# Patient Record
Sex: MALE | Race: WHITE | Employment: OTHER | URBAN - NONMETROPOLITAN AREA
[De-identification: names, ages, dates, MRNs, and addresses within clinical notes are randomized per-mention and may not be internally consistent; named-entity substitution may affect disease eponyms.]

---

## 2017-01-01 ENCOUNTER — APPOINTMENT (OUTPATIENT)
Dept: GENERAL RADIOLOGY | Age: 82
DRG: 312 | End: 2017-01-01
Payer: MEDICARE

## 2017-01-01 ENCOUNTER — HOSPITAL ENCOUNTER (EMERGENCY)
Age: 82
Discharge: NURSING, REHAB OR LONG-TERM CARE | End: 2017-11-21
Attending: EMERGENCY MEDICINE
Payer: MEDICARE

## 2017-01-01 ENCOUNTER — APPOINTMENT (OUTPATIENT)
Dept: ULTRASOUND IMAGING | Age: 82
End: 2017-01-01
Payer: MEDICARE

## 2017-01-01 ENCOUNTER — TELEPHONE (OUTPATIENT)
Dept: UROLOGY | Age: 82
End: 2017-01-01

## 2017-01-01 ENCOUNTER — OFFICE VISIT (OUTPATIENT)
Dept: UROLOGY | Age: 82
End: 2017-01-01

## 2017-01-01 ENCOUNTER — APPOINTMENT (OUTPATIENT)
Dept: GENERAL RADIOLOGY | Age: 82
End: 2017-01-01
Payer: MEDICARE

## 2017-01-01 ENCOUNTER — HOSPITAL ENCOUNTER (EMERGENCY)
Age: 82
Discharge: NURSING, REHAB OR LONG-TERM CARE | End: 2017-11-22
Payer: MEDICARE

## 2017-01-01 ENCOUNTER — APPOINTMENT (OUTPATIENT)
Dept: CT IMAGING | Age: 82
End: 2017-01-01
Payer: MEDICARE

## 2017-01-01 ENCOUNTER — APPOINTMENT (OUTPATIENT)
Dept: GENERAL RADIOLOGY | Age: 82
DRG: 194 | End: 2017-01-01
Payer: MEDICARE

## 2017-01-01 ENCOUNTER — HOSPITAL ENCOUNTER (EMERGENCY)
Age: 82
Discharge: HOME | End: 2017-09-04
Attending: EMERGENCY MEDICINE
Payer: MEDICARE

## 2017-01-01 ENCOUNTER — APPOINTMENT (OUTPATIENT)
Dept: INTERVENTIONAL RADIOLOGY/VASCULAR | Age: 82
DRG: 312 | End: 2017-01-01
Payer: MEDICARE

## 2017-01-01 ENCOUNTER — HOSPITAL ENCOUNTER (INPATIENT)
Dept: INPATIENT UNIT | Age: 82
LOS: 13 days | Discharge: HOME HEALTH | DRG: 194 | End: 2017-10-17
Attending: FAMILY MEDICINE | Admitting: FAMILY MEDICINE
Payer: MEDICARE

## 2017-01-01 ENCOUNTER — HOSPITAL ENCOUNTER (INPATIENT)
Age: 82
LOS: 4 days | Discharge: HOME HEALTH | DRG: 312 | End: 2017-09-19
Attending: INTERNAL MEDICINE | Admitting: INTERNAL MEDICINE
Payer: MEDICARE

## 2017-01-01 ENCOUNTER — APPOINTMENT (OUTPATIENT)
Dept: CT IMAGING | Age: 82
DRG: 194 | End: 2017-01-01
Payer: MEDICARE

## 2017-01-01 ENCOUNTER — HOSPITAL ENCOUNTER (INPATIENT)
Age: 82
LOS: 9 days | Discharge: NURSING, REHAB OR LONG-TERM CARE | DRG: 194 | End: 2017-10-04
Attending: EMERGENCY MEDICINE | Admitting: INTERNAL MEDICINE
Payer: MEDICARE

## 2017-01-01 ENCOUNTER — APPOINTMENT (OUTPATIENT)
Dept: CT IMAGING | Age: 82
DRG: 312 | End: 2017-01-01
Payer: MEDICARE

## 2017-01-01 VITALS
SYSTOLIC BLOOD PRESSURE: 158 MMHG | RESPIRATION RATE: 16 BRPM | DIASTOLIC BLOOD PRESSURE: 73 MMHG | WEIGHT: 154.9 LBS | BODY MASS INDEX: 22.18 KG/M2 | TEMPERATURE: 96.9 F | HEIGHT: 70 IN | HEART RATE: 68 BPM | OXYGEN SATURATION: 98 %

## 2017-01-01 VITALS
OXYGEN SATURATION: 95 % | WEIGHT: 166.5 LBS | HEART RATE: 65 BPM | DIASTOLIC BLOOD PRESSURE: 88 MMHG | HEIGHT: 70 IN | SYSTOLIC BLOOD PRESSURE: 137 MMHG | RESPIRATION RATE: 18 BRPM | BODY MASS INDEX: 20.16 KG/M2 | DIASTOLIC BLOOD PRESSURE: 60 MMHG | RESPIRATION RATE: 20 BRPM | TEMPERATURE: 98.1 F | HEIGHT: 70 IN | WEIGHT: 140.8 LBS | BODY MASS INDEX: 23.84 KG/M2 | SYSTOLIC BLOOD PRESSURE: 137 MMHG | TEMPERATURE: 98 F | HEART RATE: 68 BPM | OXYGEN SATURATION: 93 %

## 2017-01-01 VITALS — HEIGHT: 70 IN | BODY MASS INDEX: 24.34 KG/M2 | WEIGHT: 170 LBS

## 2017-01-01 VITALS
OXYGEN SATURATION: 97 % | DIASTOLIC BLOOD PRESSURE: 72 MMHG | SYSTOLIC BLOOD PRESSURE: 155 MMHG | TEMPERATURE: 98.7 F | RESPIRATION RATE: 16 BRPM | HEART RATE: 66 BPM

## 2017-01-01 VITALS
OXYGEN SATURATION: 95 % | HEART RATE: 83 BPM | DIASTOLIC BLOOD PRESSURE: 85 MMHG | TEMPERATURE: 97.7 F | RESPIRATION RATE: 18 BRPM | SYSTOLIC BLOOD PRESSURE: 143 MMHG

## 2017-01-01 VITALS
DIASTOLIC BLOOD PRESSURE: 63 MMHG | HEART RATE: 57 BPM | HEIGHT: 70 IN | WEIGHT: 150 LBS | TEMPERATURE: 97.9 F | SYSTOLIC BLOOD PRESSURE: 136 MMHG | OXYGEN SATURATION: 97 % | BODY MASS INDEX: 21.47 KG/M2 | RESPIRATION RATE: 17 BRPM
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
## 2017-01-01 DIAGNOSIS — I67.9 CEREBROVASCULAR DISEASE: Primary | ICD-10-CM

## 2017-01-01 DIAGNOSIS — S40.021A CONTUSION, SHOULDER AND UPPER ARM, MULTIPLE SITES, RIGHT, INITIAL ENCOUNTER: ICD-10-CM

## 2017-01-01 DIAGNOSIS — R26.2 UNABLE TO AMBULATE: ICD-10-CM
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
## 2017-01-01 DIAGNOSIS — I10 HYPERTENSION, UNSPECIFIED TYPE: Primary | ICD-10-CM

## 2017-01-01 DIAGNOSIS — R53.81 DEBILITY: ICD-10-CM

## 2017-01-01 DIAGNOSIS — E87.1 HYPONATREMIA: ICD-10-CM

## 2017-01-01 DIAGNOSIS — S40.011A CONTUSION, SHOULDER AND UPPER ARM, MULTIPLE SITES, RIGHT, INITIAL ENCOUNTER: ICD-10-CM

## 2017-01-01 DIAGNOSIS — N28.89 RENAL MASS: Primary | ICD-10-CM

## 2017-01-01 DIAGNOSIS — J18.9 PNEUMONIA DUE TO ORGANISM: Primary | ICD-10-CM

## 2017-01-01 DIAGNOSIS — S09.90XA CLOSED HEAD INJURY, INITIAL ENCOUNTER: ICD-10-CM

## 2017-01-01 DIAGNOSIS — R55 SYNCOPE AND COLLAPSE: Primary | ICD-10-CM

## 2017-01-01 DIAGNOSIS — R41.0 DELIRIUM: Primary | ICD-10-CM

## 2017-01-01 DIAGNOSIS — S01.01XA SCALP LACERATION, INITIAL ENCOUNTER: ICD-10-CM

## 2017-01-01 LAB
ALBUMIN SERPL-MCNC: 3.1 G/DL (ref 3.5–5.1)
ALBUMIN SERPL-MCNC: 3.3 G/DL (ref 3.5–5.1)
ALBUMIN SERPL-MCNC: 3.5 G/DL (ref 3.5–5.1)
ALLEN TEST: POSITIVE
ALP BLD-CCNC: 63 U/L (ref 38–126)
ALP BLD-CCNC: 68 U/L (ref 38–126)
ALP BLD-CCNC: 68 U/L (ref 38–126)
ALT SERPL-CCNC: 8 U/L (ref 11–66)
ALT SERPL-CCNC: 8 U/L (ref 11–66)
ALT SERPL-CCNC: 9 U/L (ref 11–66)
AMPHETAMINE+METHAMPHETAMINE URINE SCREEN: NEGATIVE
ANION GAP SERPL CALCULATED.3IONS-SCNC: 10 MEQ/L (ref 8–16)
ANION GAP SERPL CALCULATED.3IONS-SCNC: 12 MEQ/L (ref 8–16)
ANION GAP SERPL CALCULATED.3IONS-SCNC: 14 MEQ/L (ref 8–16)
ANION GAP SERPL CALCULATED.3IONS-SCNC: 9 MEQ/L (ref 8–16)
ANION GAP SERPL CALCULATED.3IONS-SCNC: 9 MEQ/L (ref 8–16)
APTT: 30 SECONDS (ref 22–38)
AST SERPL-CCNC: 18 U/L (ref 5–40)
AST SERPL-CCNC: 19 U/L (ref 5–40)
AST SERPL-CCNC: 22 U/L (ref 5–40)
AVERAGE GLUCOSE: 102 MG/DL (ref 70–126)
BACTERIA: ABNORMAL
BACTERIA: ABNORMAL /HPF
BARBITURATE QUANTITATIVE URINE: NEGATIVE
BASE EXCESS (CALCULATED): 6 MMOL/L (ref -2.5–2.5)
BASOPHILS # BLD: 0.5 %
BASOPHILS # BLD: 0.5 %
BASOPHILS # BLD: 0.6 %
BASOPHILS # BLD: 1 %
BASOPHILS # BLD: 1.1 %
BASOPHILS # BLD: 1.2 %
BASOPHILS # BLD: 2.6 %
BASOPHILS ABSOLUTE: 0 THOU/MM3 (ref 0–0.1)
BASOPHILS ABSOLUTE: 0.1 THOU/MM3 (ref 0–0.1)
BASOPHILS ABSOLUTE: 0.2 THOU/MM3 (ref 0–0.1)
BENZODIAZEPINE QUANTITATIVE URINE: NEGATIVE
BILIRUB SERPL-MCNC: 0.3 MG/DL (ref 0.3–1.2)
BILIRUB SERPL-MCNC: 0.6 MG/DL (ref 0.3–1.2)
BILIRUB SERPL-MCNC: 0.7 MG/DL (ref 0.3–1.2)
BILIRUBIN DIRECT: < 0.2 MG/DL (ref 0–0.3)
BILIRUBIN DIRECT: < 0.2 MG/DL (ref 0–0.3)
BILIRUBIN URINE: NEGATIVE
BLOOD CULTURE, ROUTINE: NORMAL
BLOOD CULTURE, ROUTINE: NORMAL
BLOOD, URINE: NEGATIVE
BUN BLDV-MCNC: 10 MG/DL (ref 7–22)
BUN BLDV-MCNC: 12 MG/DL (ref 7–22)
BUN BLDV-MCNC: 12 MG/DL (ref 7–22)
BUN BLDV-MCNC: 16 MG/DL (ref 7–22)
BUN BLDV-MCNC: 17 MG/DL (ref 7–22)
BUN BLDV-MCNC: 20 MG/DL (ref 7–22)
BUN BLDV-MCNC: 23 MG/DL (ref 7–22)
CALCIUM SERPL-MCNC: 8.1 MG/DL (ref 8.5–10.5)
CALCIUM SERPL-MCNC: 8.3 MG/DL (ref 8.5–10.5)
CALCIUM SERPL-MCNC: 8.4 MG/DL (ref 8.5–10.5)
CALCIUM SERPL-MCNC: 8.5 MG/DL (ref 8.5–10.5)
CALCIUM SERPL-MCNC: 8.5 MG/DL (ref 8.5–10.5)
CALCIUM SERPL-MCNC: 8.8 MG/DL (ref 8.5–10.5)
CALCIUM SERPL-MCNC: 8.9 MG/DL (ref 8.5–10.5)
CANNABINOID QUANTITATIVE URINE: NEGATIVE
CASTS 2: ABNORMAL /LPF
CASTS UA: ABNORMAL /LPF
CASTS: ABNORMAL /LPF
CASTS: ABNORMAL /LPF
CHARACTER, URINE: ABNORMAL
CHARACTER, URINE: CLEAR
CHLORIDE BLD-SCNC: 100 MEQ/L (ref 98–111)
CHLORIDE BLD-SCNC: 101 MEQ/L (ref 98–111)
CHLORIDE BLD-SCNC: 90 MEQ/L (ref 98–111)
CHLORIDE BLD-SCNC: 93 MEQ/L (ref 98–111)
CHLORIDE BLD-SCNC: 94 MEQ/L (ref 98–111)
CHLORIDE BLD-SCNC: 96 MEQ/L (ref 98–111)
CHLORIDE BLD-SCNC: 97 MEQ/L (ref 98–111)
CO2: 21 MEQ/L (ref 23–33)
CO2: 23 MEQ/L (ref 23–33)
CO2: 27 MEQ/L (ref 23–33)
CO2: 27 MEQ/L (ref 23–33)
CO2: 28 MEQ/L (ref 23–33)
CO2: 28 MEQ/L (ref 23–33)
CO2: 29 MEQ/L (ref 23–33)
COCAINE METABOLITE QUANTITATIVE URINE: NEGATIVE
COLLECTED BY:: ABNORMAL
COLOR: YELLOW
CORTISOL COLLECTION INFO: NORMAL
CORTISOL: 12.11 UG/DL
CREAT SERPL-MCNC: 0.6 MG/DL (ref 0.4–1.2)
CREAT SERPL-MCNC: 0.6 MG/DL (ref 0.4–1.2)
CREAT SERPL-MCNC: 0.8 MG/DL (ref 0.4–1.2)
CREAT SERPL-MCNC: 0.8 MG/DL (ref 0.4–1.2)
CREAT SERPL-MCNC: 0.9 MG/DL (ref 0.4–1.2)
CREAT SERPL-MCNC: 0.9 MG/DL (ref 0.4–1.2)
CREAT SERPL-MCNC: 1 MG/DL (ref 0.4–1.2)
CRYSTALS, UA: ABNORMAL
CRYSTALS: ABNORMAL
DEVICE: ABNORMAL
EKG ATRIAL RATE: 75 BPM
EKG ATRIAL RATE: 80 BPM
EKG P AXIS: 45 DEGREES
EKG P AXIS: 58 DEGREES
EKG P-R INTERVAL: 264 MS
EKG P-R INTERVAL: 280 MS
EKG Q-T INTERVAL: 390 MS
EKG Q-T INTERVAL: 406 MS
EKG QRS DURATION: 76 MS
EKG QRS DURATION: 80 MS
EKG QTC CALCULATION (BAZETT): 449 MS
EKG QTC CALCULATION (BAZETT): 453 MS
EKG R AXIS: -60 DEGREES
EKG R AXIS: -61 DEGREES
EKG T AXIS: 41 DEGREES
EKG T AXIS: 44 DEGREES
EKG VENTRICULAR RATE: 75 BPM
EKG VENTRICULAR RATE: 80 BPM
EOSINOPHIL # BLD: 0.3 %
EOSINOPHIL # BLD: 0.9 %
EOSINOPHIL # BLD: 0.9 %
EOSINOPHIL # BLD: 1.8 %
EOSINOPHIL # BLD: 3.6 %
EOSINOPHIL # BLD: 3.9 %
EOSINOPHIL # BLD: 5.1 %
EOSINOPHILS ABSOLUTE: 0 THOU/MM3 (ref 0–0.4)
EOSINOPHILS ABSOLUTE: 0.1 THOU/MM3 (ref 0–0.4)
EOSINOPHILS ABSOLUTE: 0.2 THOU/MM3 (ref 0–0.4)
EOSINOPHILS ABSOLUTE: 0.2 THOU/MM3 (ref 0–0.4)
EOSINOPHILS ABSOLUTE: 0.3 THOU/MM3 (ref 0–0.4)
EPITHELIAL CELLS, UA: ABNORMAL /HPF
ETHYL ALCOHOL, SERUM: < 0.01 %
GFR SERPL CREATININE-BSD FRML MDRD: 69 ML/MIN/1.73M2
GFR SERPL CREATININE-BSD FRML MDRD: 78 ML/MIN/1.73M2
GFR SERPL CREATININE-BSD FRML MDRD: 78 ML/MIN/1.73M2
GFR SERPL CREATININE-BSD FRML MDRD: 89 ML/MIN/1.73M2
GFR SERPL CREATININE-BSD FRML MDRD: 89 ML/MIN/1.73M2
GFR SERPL CREATININE-BSD FRML MDRD: > 90 ML/MIN/1.73M2
GFR SERPL CREATININE-BSD FRML MDRD: > 90 ML/MIN/1.73M2
GLUCOSE BLD-MCNC: 102 MG/DL (ref 70–108)
GLUCOSE BLD-MCNC: 102 MG/DL (ref 70–108)
GLUCOSE BLD-MCNC: 104 MG/DL (ref 70–108)
GLUCOSE BLD-MCNC: 106 MG/DL (ref 70–108)
GLUCOSE BLD-MCNC: 108 MG/DL (ref 70–108)
GLUCOSE BLD-MCNC: 109 MG/DL (ref 70–108)
GLUCOSE BLD-MCNC: 113 MG/DL (ref 70–108)
GLUCOSE BLD-MCNC: 114 MG/DL (ref 70–108)
GLUCOSE BLD-MCNC: 117 MG/DL (ref 70–108)
GLUCOSE BLD-MCNC: 119 MG/DL (ref 70–108)
GLUCOSE BLD-MCNC: 125 MG/DL (ref 70–108)
GLUCOSE BLD-MCNC: 134 MG/DL (ref 70–108)
GLUCOSE BLD-MCNC: 135 MG/DL (ref 70–108)
GLUCOSE BLD-MCNC: 136 MG/DL (ref 70–108)
GLUCOSE BLD-MCNC: 138 MG/DL (ref 70–108)
GLUCOSE BLD-MCNC: 143 MG/DL (ref 70–108)
GLUCOSE BLD-MCNC: 156 MG/DL (ref 70–108)
GLUCOSE BLD-MCNC: 159 MG/DL (ref 70–108)
GLUCOSE BLD-MCNC: 164 MG/DL (ref 70–108)
GLUCOSE BLD-MCNC: 167 MG/DL (ref 70–108)
GLUCOSE BLD-MCNC: 171 MG/DL (ref 70–108)
GLUCOSE BLD-MCNC: 173 MG/DL (ref 70–108)
GLUCOSE BLD-MCNC: 180 MG/DL (ref 70–108)
GLUCOSE BLD-MCNC: 196 MG/DL (ref 70–108)
GLUCOSE BLD-MCNC: 199 MG/DL (ref 70–108)
GLUCOSE BLD-MCNC: 202 MG/DL (ref 70–108)
GLUCOSE BLD-MCNC: 255 MG/DL (ref 70–108)
GLUCOSE BLD-MCNC: 84 MG/DL (ref 70–108)
GLUCOSE BLD-MCNC: 86 MG/DL (ref 70–108)
GLUCOSE BLD-MCNC: 89 MG/DL (ref 70–108)
GLUCOSE BLD-MCNC: 90 MG/DL (ref 70–108)
GLUCOSE BLD-MCNC: 92 MG/DL
GLUCOSE BLD-MCNC: 92 MG/DL (ref 70–108)
GLUCOSE BLD-MCNC: 92 MG/DL (ref 70–108)
GLUCOSE BLD-MCNC: 96 MG/DL (ref 70–108)
GLUCOSE BLD-MCNC: 97 MG/DL (ref 70–108)
GLUCOSE BLD-MCNC: 97 MG/DL (ref 70–108)
GLUCOSE BLD-MCNC: 99 MG/DL (ref 70–108)
GLUCOSE URINE: NEGATIVE MG/DL
GLUCOSE, URINE: NEGATIVE MG/DL
HBA1C MFR BLD: 5.4 % (ref 4.4–6.4)
HCO3: 31 MMOL/L (ref 23–28)
HCT VFR BLD CALC: 31.7 % (ref 42–52)
HCT VFR BLD CALC: 32.8 % (ref 42–52)
HCT VFR BLD CALC: 33.8 % (ref 42–52)
HCT VFR BLD CALC: 35.4 % (ref 42–52)
HCT VFR BLD CALC: 35.8 % (ref 42–52)
HCT VFR BLD CALC: 36.9 % (ref 42–52)
HCT VFR BLD CALC: 39.6 % (ref 42–52)
HCT VFR BLD CALC: 40.2 % (ref 42–52)
HEMOGLOBIN: 11 GM/DL (ref 14–18)
HEMOGLOBIN: 11.3 GM/DL (ref 14–18)
HEMOGLOBIN: 11.5 GM/DL (ref 14–18)
HEMOGLOBIN: 12.2 GM/DL (ref 14–18)
HEMOGLOBIN: 12.3 GM/DL (ref 14–18)
HEMOGLOBIN: 12.6 GM/DL (ref 14–18)
HEMOGLOBIN: 13.5 GM/DL (ref 14–18)
HEMOGLOBIN: 13.5 GM/DL (ref 14–18)
INR BLD: 0.93 (ref 0.85–1.13)
INR BLD: 0.99 (ref 0.85–1.13)
KETONES, URINE: NEGATIVE
LEUKOCYTE ESTERASE, URINE: ABNORMAL
LEUKOCYTE ESTERASE, URINE: ABNORMAL
LEUKOCYTE ESTERASE, URINE: NEGATIVE
LIPASE: 53.5 U/L (ref 5.6–51.3)
LV EF: 58 %
LVEF MODALITY: NORMAL
LYMPHOCYTES # BLD: 10.7 %
LYMPHOCYTES # BLD: 12.5 %
LYMPHOCYTES # BLD: 13.2 %
LYMPHOCYTES # BLD: 14 %
LYMPHOCYTES # BLD: 15.1 %
LYMPHOCYTES # BLD: 15.8 %
LYMPHOCYTES # BLD: 18.7 %
LYMPHOCYTES ABSOLUTE: 0.8 THOU/MM3 (ref 1–4.8)
LYMPHOCYTES ABSOLUTE: 0.9 THOU/MM3 (ref 1–4.8)
LYMPHOCYTES ABSOLUTE: 1.1 THOU/MM3 (ref 1–4.8)
MACROCYTES: ABNORMAL
MAGNESIUM: 2 MG/DL (ref 1.6–2.4)
MAGNESIUM: 2.2 MG/DL (ref 1.6–2.4)
MCH RBC QN AUTO: 32.7 PG (ref 27–31)
MCH RBC QN AUTO: 33.5 PG (ref 27–31)
MCH RBC QN AUTO: 34 PG (ref 27–31)
MCH RBC QN AUTO: 34 PG (ref 27–31)
MCH RBC QN AUTO: 34.1 PG (ref 27–31)
MCH RBC QN AUTO: 34.2 PG (ref 27–31)
MCH RBC QN AUTO: 34.2 PG (ref 27–31)
MCHC RBC AUTO-ENTMCNC: 33.6 GM/DL (ref 33–37)
MCHC RBC AUTO-ENTMCNC: 33.9 GM/DL (ref 33–37)
MCHC RBC AUTO-ENTMCNC: 34 GM/DL (ref 33–37)
MCHC RBC AUTO-ENTMCNC: 34.1 GM/DL (ref 33–37)
MCHC RBC AUTO-ENTMCNC: 34.4 GM/DL (ref 33–37)
MCHC RBC AUTO-ENTMCNC: 34.4 GM/DL (ref 33–37)
MCHC RBC AUTO-ENTMCNC: 34.6 GM/DL (ref 33–37)
MCV RBC AUTO: 97.3 FL (ref 80–94)
MCV RBC AUTO: 98.8 FL (ref 80–94)
MCV RBC AUTO: 98.9 FL (ref 80–94)
MCV RBC AUTO: 99 FL (ref 80–94)
MCV RBC AUTO: 99.6 FL (ref 80–94)
MCV RBC AUTO: 99.8 FL (ref 80–94)
MCV RBC AUTO: 99.9 FL (ref 80–94)
MISCELLANEOUS 2: ABNORMAL
MISCELLANEOUS LAB TEST RESULT: ABNORMAL
MONOCYTES # BLD: 10.4 %
MONOCYTES # BLD: 10.8 %
MONOCYTES # BLD: 11 %
MONOCYTES # BLD: 12.2 %
MONOCYTES # BLD: 12.4 %
MONOCYTES # BLD: 8.7 %
MONOCYTES # BLD: 9 %
MONOCYTES ABSOLUTE: 0.6 THOU/MM3 (ref 0.4–1.3)
MONOCYTES ABSOLUTE: 0.7 THOU/MM3 (ref 0.4–1.3)
MONOCYTES ABSOLUTE: 0.8 THOU/MM3 (ref 0.4–1.3)
NITRITE, URINE: NEGATIVE
NUCLEATED RED BLOOD CELLS: 0 /100 WBC
O2 SATURATION: 92 %
OPIATES, URINE: NEGATIVE
ORGANISM: ABNORMAL
OSMOLALITY CALCULATION: 257 MOSMOL/KG (ref 275–300)
OSMOLALITY CALCULATION: 267 MOSMOL/KG (ref 275–300)
OSMOLALITY CALCULATION: 271.6 MOSMOL/KG (ref 275–300)
OSMOLALITY CALCULATION: 273.5 MOSMOL/KG (ref 275–300)
OSMOLALITY CALCULATION: 279.4 MOSMOL/KG (ref 275–300)
OSMOLALITY URINE: 341 MOSMOL/KG (ref 250–750)
OSMOLALITY: 272 MOSMOL/KG (ref 275–295)
OXYCODONE: NEGATIVE
PCO2: 44 MMHG (ref 35–45)
PDW BLD-RTO: 13.6 % (ref 11.5–14.5)
PDW BLD-RTO: 13.8 % (ref 11.5–14.5)
PDW BLD-RTO: 13.9 % (ref 11.5–14.5)
PDW BLD-RTO: 14.1 % (ref 11.5–14.5)
PH BLOOD GAS: 7.45 (ref 7.35–7.45)
PH UA: 6
PH UA: 6.5
PH UA: 6.5
PH UA: 7.5
PH UA: 7.5
PHENCYCLIDINE QUANTITATIVE URINE: NEGATIVE
PHOSPHORUS: 3.3 MG/DL (ref 2.4–4.7)
PLATELET # BLD: 170 THOU/MM3 (ref 130–400)
PLATELET # BLD: 181 THOU/MM3 (ref 130–400)
PLATELET # BLD: 190 THOU/MM3 (ref 130–400)
PLATELET # BLD: 195 THOU/MM3 (ref 130–400)
PLATELET # BLD: 219 THOU/MM3 (ref 130–400)
PLATELET # BLD: 230 THOU/MM3 (ref 130–400)
PLATELET # BLD: 265 THOU/MM3 (ref 130–400)
PMV BLD AUTO: 7.1 MCM (ref 7.4–10.4)
PMV BLD AUTO: 7.5 MCM (ref 7.4–10.4)
PMV BLD AUTO: 7.6 MCM (ref 7.4–10.4)
PMV BLD AUTO: 7.6 MCM (ref 7.4–10.4)
PMV BLD AUTO: 7.7 MCM (ref 7.4–10.4)
PMV BLD AUTO: 7.9 MCM (ref 7.4–10.4)
PMV BLD AUTO: 8.6 MCM (ref 7.4–10.4)
PO2: 60 MMHG (ref 71–104)
POTASSIUM SERPL-SCNC: 3.6 MEQ/L (ref 3.5–5.2)
POTASSIUM SERPL-SCNC: 3.8 MEQ/L (ref 3.5–5.2)
POTASSIUM SERPL-SCNC: 3.9 MEQ/L (ref 3.5–5.2)
POTASSIUM SERPL-SCNC: 3.9 MEQ/L (ref 3.5–5.2)
POTASSIUM SERPL-SCNC: 4 MEQ/L (ref 3.5–5.2)
POTASSIUM SERPL-SCNC: 4.1 MEQ/L (ref 3.5–5.2)
POTASSIUM SERPL-SCNC: 5 MEQ/L (ref 3.5–5.2)
PRO-BNP: 1425 PG/ML (ref 0–1800)
PRO-BNP: 193.1 PG/ML (ref 0–1800)
PROTEIN UA: 30
PROTEIN UA: ABNORMAL MG/DL
PROTEIN UA: NEGATIVE
RBC # BLD: 3.32 MILL/MM3 (ref 4.7–6.1)
RBC # BLD: 3.42 MILL/MM3 (ref 4.7–6.1)
RBC # BLD: 3.57 MILL/MM3 (ref 4.7–6.1)
RBC # BLD: 3.59 MILL/MM3 (ref 4.7–6.1)
RBC # BLD: 3.69 MILL/MM3 (ref 4.7–6.1)
RBC # BLD: 3.97 MILL/MM3 (ref 4.7–6.1)
RBC # BLD: 4.13 MILL/MM3 (ref 4.7–6.1)
RBC # BLD: NORMAL 10*6/UL
RBC URINE: ABNORMAL /HPF
RENAL EPITHELIAL, UA: ABNORMAL
SEG NEUTROPHILS: 64 %
SEG NEUTROPHILS: 67.1 %
SEG NEUTROPHILS: 67.9 %
SEG NEUTROPHILS: 70.7 %
SEG NEUTROPHILS: 75.6 %
SEG NEUTROPHILS: 76.4 %
SEG NEUTROPHILS: 79.8 %
SEGMENTED NEUTROPHILS ABSOLUTE COUNT: 3.8 THOU/MM3 (ref 1.8–7.7)
SEGMENTED NEUTROPHILS ABSOLUTE COUNT: 3.9 THOU/MM3 (ref 1.8–7.7)
SEGMENTED NEUTROPHILS ABSOLUTE COUNT: 4.2 THOU/MM3 (ref 1.8–7.7)
SEGMENTED NEUTROPHILS ABSOLUTE COUNT: 4.7 THOU/MM3 (ref 1.8–7.7)
SEGMENTED NEUTROPHILS ABSOLUTE COUNT: 4.9 THOU/MM3 (ref 1.8–7.7)
SEGMENTED NEUTROPHILS ABSOLUTE COUNT: 5 THOU/MM3 (ref 1.8–7.7)
SEGMENTED NEUTROPHILS ABSOLUTE COUNT: 6.3 THOU/MM3 (ref 1.8–7.7)
SODIUM BLD-SCNC: 127 MEQ/L (ref 135–145)
SODIUM BLD-SCNC: 131 MEQ/L (ref 135–145)
SODIUM BLD-SCNC: 132 MEQ/L (ref 135–145)
SODIUM BLD-SCNC: 132 MEQ/L (ref 135–145)
SODIUM BLD-SCNC: 135 MEQ/L (ref 135–145)
SODIUM BLD-SCNC: 137 MEQ/L (ref 135–145)
SODIUM BLD-SCNC: 138 MEQ/L (ref 135–145)
SODIUM URINE: 98 MEQ/L
SOURCE, BLOOD GAS: ABNORMAL
SPECIFIC GRAVITY UA: 1.02 (ref 1–1.03)
SPECIFIC GRAVITY, URINE: 1.01 (ref 1–1.03)
SPECIFIC GRAVITY, URINE: 1.02 (ref 1–1.03)
TOTAL CK: 66 U/L (ref 55–170)
TOTAL CK: 71 U/L (ref 55–170)
TOTAL CK: 74 U/L (ref 55–170)
TOTAL PROTEIN: 5.3 G/DL (ref 6.1–8)
TOTAL PROTEIN: 5.9 G/DL (ref 6.1–8)
TOTAL PROTEIN: 6.1 G/DL (ref 6.1–8)
TROPONIN T: < 0.01 NG/ML
TSH SERPL DL<=0.05 MIU/L-ACNC: 2.19 UIU/ML (ref 0.4–4.2)
URINE CULTURE REFLEX: ABNORMAL
UROBILINOGEN, URINE: 0.2 EU/DL
WBC # BLD: 5.6 THOU/MM3 (ref 4.8–10.8)
WBC # BLD: 6.1 THOU/MM3 (ref 4.8–10.8)
WBC # BLD: 6.2 THOU/MM3 (ref 4.8–10.8)
WBC # BLD: 6.4 THOU/MM3 (ref 4.8–10.8)
WBC # BLD: 6.6 THOU/MM3 (ref 4.8–10.8)
WBC # BLD: 6.6 THOU/MM3 (ref 4.8–10.8)
WBC # BLD: 7.9 THOU/MM3 (ref 4.8–10.8)
WBC UA: ABNORMAL /HPF
YEAST: ABNORMAL

## 2017-01-01 PROCEDURE — 4040F PNEUMOC VAC/ADMIN/RCVD: CPT | Performed by: UROLOGY

## 2017-01-01 PROCEDURE — 1290000000 HC SEMI PRIVATE OTHER R&B

## 2017-01-01 PROCEDURE — 94640 AIRWAY INHALATION TREATMENT: CPT

## 2017-01-01 PROCEDURE — 97530 THERAPEUTIC ACTIVITIES: CPT

## 2017-01-01 PROCEDURE — 93005 ELECTROCARDIOGRAM TRACING: CPT

## 2017-01-01 PROCEDURE — 6360000002 HC RX W HCPCS: Performed by: INTERNAL MEDICINE

## 2017-01-01 PROCEDURE — 85610 PROTHROMBIN TIME: CPT

## 2017-01-01 PROCEDURE — 70450 CT HEAD/BRAIN W/O DYE: CPT

## 2017-01-01 PROCEDURE — 6370000000 HC RX 637 (ALT 250 FOR IP): Performed by: INTERNAL MEDICINE

## 2017-01-01 PROCEDURE — 97110 THERAPEUTIC EXERCISES: CPT

## 2017-01-01 PROCEDURE — 83735 ASSAY OF MAGNESIUM: CPT

## 2017-01-01 PROCEDURE — 71260 CT THORAX DX C+: CPT

## 2017-01-01 PROCEDURE — 97535 SELF CARE MNGMENT TRAINING: CPT

## 2017-01-01 PROCEDURE — 82948 REAGENT STRIP/BLOOD GLUCOSE: CPT

## 2017-01-01 PROCEDURE — 84484 ASSAY OF TROPONIN QUANT: CPT

## 2017-01-01 PROCEDURE — 1200000003 HC TELEMETRY R&B

## 2017-01-01 PROCEDURE — 94669 MECHANICAL CHEST WALL OSCILL: CPT

## 2017-01-01 PROCEDURE — 99307 SBSQ NF CARE SF MDM 10: CPT | Performed by: FAMILY MEDICINE

## 2017-01-01 PROCEDURE — 87086 URINE CULTURE/COLONY COUNT: CPT

## 2017-01-01 PROCEDURE — 82550 ASSAY OF CK (CPK): CPT

## 2017-01-01 PROCEDURE — 97165 OT EVAL LOW COMPLEX 30 MIN: CPT

## 2017-01-01 PROCEDURE — 87186 SC STD MICRODIL/AGAR DIL: CPT

## 2017-01-01 PROCEDURE — 2580000003 HC RX 258: Performed by: INTERNAL MEDICINE

## 2017-01-01 PROCEDURE — 2580000003 HC RX 258: Performed by: HOSPITALIST

## 2017-01-01 PROCEDURE — 1210000002 HC MED SURG R&B - NEUROSCIENCE

## 2017-01-01 PROCEDURE — 72128 CT CHEST SPINE W/O DYE: CPT

## 2017-01-01 PROCEDURE — A4421 OSTOMY SUPPLY MISC: HCPCS

## 2017-01-01 PROCEDURE — 99285 EMERGENCY DEPT VISIT HI MDM: CPT

## 2017-01-01 PROCEDURE — 96365 THER/PROPH/DIAG IV INF INIT: CPT

## 2017-01-01 PROCEDURE — 72125 CT NECK SPINE W/O DYE: CPT

## 2017-01-01 PROCEDURE — 36415 COLL VENOUS BLD VENIPUNCTURE: CPT

## 2017-01-01 PROCEDURE — 2500000003 HC RX 250 WO HCPCS: Performed by: EMERGENCY MEDICINE

## 2017-01-01 PROCEDURE — 71020 XR CHEST STANDARD TWO VW: CPT

## 2017-01-01 PROCEDURE — 6820000001 HC L2 TRAUMA SURGERY EVALUATION

## 2017-01-01 PROCEDURE — 74177 CT ABD & PELVIS W/CONTRAST: CPT

## 2017-01-01 PROCEDURE — 97112 NEUROMUSCULAR REEDUCATION: CPT

## 2017-01-01 PROCEDURE — 73060 X-RAY EXAM OF HUMERUS: CPT

## 2017-01-01 PROCEDURE — 6360000004 HC RX CONTRAST MEDICATION: Performed by: EMERGENCY MEDICINE

## 2017-01-01 PROCEDURE — G8420 CALC BMI NORM PARAMETERS: HCPCS | Performed by: UROLOGY

## 2017-01-01 PROCEDURE — 71010 XR CHEST PORTABLE: CPT

## 2017-01-01 PROCEDURE — 0220000000 HC SKILLED NURSING FACILITY

## 2017-01-01 PROCEDURE — 85025 COMPLETE CBC W/AUTO DIFF WBC: CPT

## 2017-01-01 PROCEDURE — 83880 ASSAY OF NATRIURETIC PEPTIDE: CPT

## 2017-01-01 PROCEDURE — 97116 GAIT TRAINING THERAPY: CPT

## 2017-01-01 PROCEDURE — 74176 CT ABD & PELVIS W/O CONTRAST: CPT

## 2017-01-01 PROCEDURE — 6370000000 HC RX 637 (ALT 250 FOR IP): Performed by: NURSE PRACTITIONER

## 2017-01-01 PROCEDURE — 83690 ASSAY OF LIPASE: CPT

## 2017-01-01 PROCEDURE — 80053 COMPREHEN METABOLIC PANEL: CPT

## 2017-01-01 PROCEDURE — 1123F ACP DISCUSS/DSCN MKR DOCD: CPT | Performed by: UROLOGY

## 2017-01-01 PROCEDURE — 99308 SBSQ NF CARE LOW MDM 20: CPT | Performed by: FAMILY MEDICINE

## 2017-01-01 PROCEDURE — 99999 PR OFFICE/OUTPT VISIT,PROCEDURE ONLY: CPT | Performed by: NURSE PRACTITIONER

## 2017-01-01 PROCEDURE — 82248 BILIRUBIN DIRECT: CPT

## 2017-01-01 PROCEDURE — 6370000000 HC RX 637 (ALT 250 FOR IP): Performed by: FAMILY MEDICINE

## 2017-01-01 PROCEDURE — 97166 OT EVAL MOD COMPLEX 45 MIN: CPT

## 2017-01-01 PROCEDURE — 93880 EXTRACRANIAL BILAT STUDY: CPT

## 2017-01-01 PROCEDURE — 2580000003 HC RX 258: Performed by: EMERGENCY MEDICINE

## 2017-01-01 PROCEDURE — 80307 DRUG TEST PRSMV CHEM ANLYZR: CPT

## 2017-01-01 PROCEDURE — 85014 HEMATOCRIT: CPT

## 2017-01-01 PROCEDURE — 80048 BASIC METABOLIC PNL TOTAL CA: CPT

## 2017-01-01 PROCEDURE — 6370000000 HC RX 637 (ALT 250 FOR IP): Performed by: PHYSICIAN ASSISTANT

## 2017-01-01 PROCEDURE — 1200000000 HC SEMI PRIVATE

## 2017-01-01 PROCEDURE — 92526 ORAL FUNCTION THERAPY: CPT | Performed by: SPEECH-LANGUAGE PATHOLOGIST

## 2017-01-01 PROCEDURE — 92526 ORAL FUNCTION THERAPY: CPT

## 2017-01-01 PROCEDURE — 84300 ASSAY OF URINE SODIUM: CPT

## 2017-01-01 PROCEDURE — 83935 ASSAY OF URINE OSMOLALITY: CPT

## 2017-01-01 PROCEDURE — 71100 X-RAY EXAM RIBS UNI 2 VIEWS: CPT

## 2017-01-01 PROCEDURE — 3209999900

## 2017-01-01 PROCEDURE — 84443 ASSAY THYROID STIM HORMONE: CPT

## 2017-01-01 PROCEDURE — 95819 EEG AWAKE AND ASLEEP: CPT

## 2017-01-01 PROCEDURE — G0480 DRUG TEST DEF 1-7 CLASSES: HCPCS

## 2017-01-01 PROCEDURE — 83036 HEMOGLOBIN GLYCOSYLATED A1C: CPT

## 2017-01-01 PROCEDURE — 6370000000 HC RX 637 (ALT 250 FOR IP): Performed by: HOSPITALIST

## 2017-01-01 PROCEDURE — 94760 N-INVAS EAR/PLS OXIMETRY 1: CPT

## 2017-01-01 PROCEDURE — 76376 3D RENDER W/INTRP POSTPROCES: CPT

## 2017-01-01 PROCEDURE — 99214 OFFICE O/P EST MOD 30 MIN: CPT | Performed by: UROLOGY

## 2017-01-01 PROCEDURE — 82803 BLOOD GASES ANY COMBINATION: CPT

## 2017-01-01 PROCEDURE — 90792 PSYCH DIAG EVAL W/MED SRVCS: CPT | Performed by: PHYSICIAN ASSISTANT

## 2017-01-01 PROCEDURE — 73030 X-RAY EXAM OF SHOULDER: CPT

## 2017-01-01 PROCEDURE — 83930 ASSAY OF BLOOD OSMOLALITY: CPT

## 2017-01-01 PROCEDURE — 92610 EVALUATE SWALLOWING FUNCTION: CPT

## 2017-01-01 PROCEDURE — 81001 URINALYSIS AUTO W/SCOPE: CPT

## 2017-01-01 PROCEDURE — 84100 ASSAY OF PHOSPHORUS: CPT

## 2017-01-01 PROCEDURE — 72170 X-RAY EXAM OF PELVIS: CPT

## 2017-01-01 PROCEDURE — G8427 DOCREV CUR MEDS BY ELIG CLIN: HCPCS | Performed by: UROLOGY

## 2017-01-01 PROCEDURE — 97161 PT EVAL LOW COMPLEX 20 MIN: CPT

## 2017-01-01 PROCEDURE — 2500000003 HC RX 250 WO HCPCS: Performed by: FAMILY MEDICINE

## 2017-01-01 PROCEDURE — 96360 HYDRATION IV INFUSION INIT: CPT

## 2017-01-01 PROCEDURE — 85018 HEMOGLOBIN: CPT

## 2017-01-01 PROCEDURE — 97163 PT EVAL HIGH COMPLEX 45 MIN: CPT

## 2017-01-01 PROCEDURE — 87077 CULTURE AEROBIC IDENTIFY: CPT

## 2017-01-01 PROCEDURE — 76775 US EXAM ABDO BACK WALL LIM: CPT

## 2017-01-01 PROCEDURE — 87040 BLOOD CULTURE FOR BACTERIA: CPT

## 2017-01-01 PROCEDURE — 36600 WITHDRAWAL OF ARTERIAL BLOOD: CPT

## 2017-01-01 PROCEDURE — 99231 SBSQ HOSP IP/OBS SF/LOW 25: CPT | Performed by: PHYSICIAN ASSISTANT

## 2017-01-01 PROCEDURE — 99306 1ST NF CARE HIGH MDM 50: CPT | Performed by: FAMILY MEDICINE

## 2017-01-01 PROCEDURE — 81003 URINALYSIS AUTO W/O SCOPE: CPT

## 2017-01-01 PROCEDURE — 6360000002 HC RX W HCPCS: Performed by: EMERGENCY MEDICINE

## 2017-01-01 PROCEDURE — 93306 TTE W/DOPPLER COMPLETE: CPT

## 2017-01-01 PROCEDURE — 99284 EMERGENCY DEPT VISIT MOD MDM: CPT

## 2017-01-01 PROCEDURE — 99222 1ST HOSP IP/OBS MODERATE 55: CPT | Performed by: SURGERY

## 2017-01-01 PROCEDURE — 71010 XR CHEST LIMITED ONE VIEW: CPT

## 2017-01-01 PROCEDURE — 85730 THROMBOPLASTIN TIME PARTIAL: CPT

## 2017-01-01 PROCEDURE — 99315 NF DSCHRG MGMT 30 MIN/LESS: CPT | Performed by: FAMILY MEDICINE

## 2017-01-01 PROCEDURE — 2580000003 HC RX 258: Performed by: NURSE PRACTITIONER

## 2017-01-01 PROCEDURE — 97162 PT EVAL MOD COMPLEX 30 MIN: CPT

## 2017-01-01 PROCEDURE — 1036F TOBACCO NON-USER: CPT | Performed by: UROLOGY

## 2017-01-01 PROCEDURE — 82533 TOTAL CORTISOL: CPT

## 2017-01-01 RX ORDER — DEXTROSE MONOHYDRATE 50 MG/ML
100 INJECTION, SOLUTION INTRAVENOUS PRN
Status: DISCONTINUED | OUTPATIENT
Start: 2017-01-01 | End: 2017-01-01 | Stop reason: HOSPADM

## 2017-01-01 RX ORDER — SODIUM CHLORIDE 9 MG/ML
INJECTION, SOLUTION INTRAVENOUS CONTINUOUS
Status: DISCONTINUED | OUTPATIENT
Start: 2017-01-01 | End: 2017-01-01

## 2017-01-01 RX ORDER — MIDODRINE HYDROCHLORIDE 5 MG/1
5 TABLET ORAL
Status: DISCONTINUED | OUTPATIENT
Start: 2017-01-01 | End: 2017-01-01

## 2017-01-01 RX ORDER — 0.9 % SODIUM CHLORIDE 0.9 %
500 INTRAVENOUS SOLUTION INTRAVENOUS ONCE
Status: COMPLETED | OUTPATIENT
Start: 2017-01-01 | End: 2017-01-01

## 2017-01-01 RX ORDER — DOCUSATE SODIUM 100 MG/1
100 CAPSULE, LIQUID FILLED ORAL DAILY PRN
Status: DISCONTINUED | OUTPATIENT
Start: 2017-01-01 | End: 2017-01-01 | Stop reason: HOSPADM

## 2017-01-01 RX ORDER — RISPERIDONE 0.5 MG/1
0.25 TABLET, ORALLY DISINTEGRATING ORAL 2 TIMES DAILY
Status: DISCONTINUED | OUTPATIENT
Start: 2017-01-01 | End: 2017-01-01

## 2017-01-01 RX ORDER — ACETAMINOPHEN 325 MG/1
650 TABLET ORAL EVERY 6 HOURS PRN
Status: DISCONTINUED | OUTPATIENT
Start: 2017-01-01 | End: 2017-01-01 | Stop reason: HOSPADM

## 2017-01-01 RX ORDER — POTASSIUM CHLORIDE 750 MG/1
10 TABLET, FILM COATED, EXTENDED RELEASE ORAL DAILY
Status: DISCONTINUED | OUTPATIENT
Start: 2017-01-01 | End: 2017-01-01 | Stop reason: HOSPADM

## 2017-01-01 RX ORDER — NITROFURANTOIN 25; 75 MG/1; MG/1
100 CAPSULE ORAL 2 TIMES DAILY
Status: DISCONTINUED | OUTPATIENT
Start: 2017-01-01 | End: 2017-01-01 | Stop reason: HOSPADM

## 2017-01-01 RX ORDER — GUAIFENESIN 600 MG/1
600 TABLET, EXTENDED RELEASE ORAL 2 TIMES DAILY
Status: DISCONTINUED | OUTPATIENT
Start: 2017-01-01 | End: 2017-01-01

## 2017-01-01 RX ORDER — IPRATROPIUM BROMIDE AND ALBUTEROL SULFATE 2.5; .5 MG/3ML; MG/3ML
1 SOLUTION RESPIRATORY (INHALATION)
Status: CANCELLED | OUTPATIENT
Start: 2017-01-01

## 2017-01-01 RX ORDER — FINASTERIDE 5 MG/1
5 TABLET, FILM COATED ORAL DAILY
Status: DISCONTINUED | OUTPATIENT
Start: 2017-01-01 | End: 2017-01-01 | Stop reason: HOSPADM

## 2017-01-01 RX ORDER — IPRATROPIUM BROMIDE AND ALBUTEROL SULFATE 2.5; .5 MG/3ML; MG/3ML
1 SOLUTION RESPIRATORY (INHALATION)
Status: DISCONTINUED | OUTPATIENT
Start: 2017-01-01 | End: 2017-01-01 | Stop reason: HOSPADM

## 2017-01-01 RX ORDER — DEXTROSE MONOHYDRATE 25 G/50ML
12.5 INJECTION, SOLUTION INTRAVENOUS PRN
Status: CANCELLED | OUTPATIENT
Start: 2017-01-01

## 2017-01-01 RX ORDER — MIDODRINE HYDROCHLORIDE 2.5 MG/1
2.5 TABLET ORAL
Status: CANCELLED | OUTPATIENT
Start: 2017-01-01

## 2017-01-01 RX ORDER — LANOLIN ALCOHOL/MO/W.PET/CERES
5 CREAM (GRAM) TOPICAL NIGHTLY
Status: DISCONTINUED | OUTPATIENT
Start: 2017-01-01 | End: 2017-01-01

## 2017-01-01 RX ORDER — SODIUM CHLORIDE 0.9 % (FLUSH) 0.9 %
10 SYRINGE (ML) INJECTION EVERY 12 HOURS SCHEDULED
Status: DISCONTINUED | OUTPATIENT
Start: 2017-01-01 | End: 2017-01-01 | Stop reason: HOSPADM

## 2017-01-01 RX ORDER — ATENOLOL 50 MG/1
50 TABLET ORAL DAILY
Status: DISCONTINUED | OUTPATIENT
Start: 2017-01-01 | End: 2017-01-01 | Stop reason: HOSPADM

## 2017-01-01 RX ORDER — IPRATROPIUM BROMIDE AND ALBUTEROL SULFATE 2.5; .5 MG/3ML; MG/3ML
1 SOLUTION RESPIRATORY (INHALATION) EVERY 4 HOURS PRN
Status: DISCONTINUED | OUTPATIENT
Start: 2017-01-01 | End: 2017-01-01

## 2017-01-01 RX ORDER — SODIUM CHLORIDE 9 MG/ML
INJECTION, SOLUTION INTRAVENOUS CONTINUOUS
Status: DISCONTINUED | OUTPATIENT
Start: 2017-01-01 | End: 2017-01-01 | Stop reason: SDUPTHER

## 2017-01-01 RX ORDER — UREA 10 %
9 LOTION (ML) TOPICAL NIGHTLY
Status: DISCONTINUED | OUTPATIENT
Start: 2017-01-01 | End: 2017-01-01 | Stop reason: HOSPADM

## 2017-01-01 RX ORDER — SERTRALINE HYDROCHLORIDE 25 MG/1
25 TABLET, FILM COATED ORAL DAILY
Status: ON HOLD | COMMUNITY
End: 2017-01-01 | Stop reason: ALTCHOICE

## 2017-01-01 RX ORDER — HYDROCODONE BITARTRATE AND ACETAMINOPHEN 5; 325 MG/1; MG/1
1 TABLET ORAL EVERY 4 HOURS PRN
Status: DISCONTINUED | OUTPATIENT
Start: 2017-01-01 | End: 2017-01-01

## 2017-01-01 RX ORDER — LIDOCAINE 50 MG/G
1 PATCH TOPICAL DAILY
Status: DISCONTINUED | OUTPATIENT
Start: 2017-01-01 | End: 2017-01-01 | Stop reason: HOSPADM

## 2017-01-01 RX ORDER — POTASSIUM CHLORIDE 750 MG/1
10 TABLET, FILM COATED, EXTENDED RELEASE ORAL DAILY
Status: CANCELLED | OUTPATIENT
Start: 2017-01-01

## 2017-01-01 RX ORDER — PREDNISONE 20 MG/1
40 TABLET ORAL DAILY
Status: DISCONTINUED | OUTPATIENT
Start: 2017-01-01 | End: 2017-01-01 | Stop reason: HOSPADM

## 2017-01-01 RX ORDER — BUSPIRONE HYDROCHLORIDE 10 MG/1
10 TABLET ORAL 2 TIMES DAILY
Status: DISCONTINUED | OUTPATIENT
Start: 2017-01-01 | End: 2017-01-01 | Stop reason: HOSPADM

## 2017-01-01 RX ORDER — ALBUTEROL SULFATE 90 UG/1
2 AEROSOL, METERED RESPIRATORY (INHALATION) EVERY 6 HOURS PRN
Status: DISCONTINUED | OUTPATIENT
Start: 2017-01-01 | End: 2017-01-01 | Stop reason: HOSPADM

## 2017-01-01 RX ORDER — LISINOPRIL 20 MG/1
20 TABLET ORAL ONCE
Status: COMPLETED | OUTPATIENT
Start: 2017-01-01 | End: 2017-01-01

## 2017-01-01 RX ORDER — MIRTAZAPINE 15 MG/1
15 TABLET, FILM COATED ORAL NIGHTLY
Status: DISCONTINUED | OUTPATIENT
Start: 2017-01-01 | End: 2017-01-01 | Stop reason: HOSPADM

## 2017-01-01 RX ORDER — DOCUSATE SODIUM 100 MG/1
100 CAPSULE, LIQUID FILLED ORAL 2 TIMES DAILY
Status: DISCONTINUED | OUTPATIENT
Start: 2017-01-01 | End: 2017-01-01 | Stop reason: HOSPADM

## 2017-01-01 RX ORDER — MIDODRINE HYDROCHLORIDE 2.5 MG/1
2.5 TABLET ORAL
Status: DISCONTINUED | OUTPATIENT
Start: 2017-01-01 | End: 2017-01-01 | Stop reason: HOSPADM

## 2017-01-01 RX ORDER — NITROFURANTOIN 25; 75 MG/1; MG/1
100 CAPSULE ORAL 2 TIMES DAILY
Status: ON HOLD | COMMUNITY
End: 2017-01-01 | Stop reason: ALTCHOICE

## 2017-01-01 RX ORDER — MIDODRINE HYDROCHLORIDE 2.5 MG/1
2.5 TABLET ORAL 2 TIMES DAILY WITH MEALS
Status: DISCONTINUED | OUTPATIENT
Start: 2017-01-01 | End: 2017-01-01 | Stop reason: HOSPADM

## 2017-01-01 RX ORDER — M-VIT,TX,IRON,MINS/CALC/FOLIC 27MG-0.4MG
1 TABLET ORAL DAILY
Status: DISCONTINUED | OUTPATIENT
Start: 2017-01-01 | End: 2017-01-01 | Stop reason: HOSPADM

## 2017-01-01 RX ORDER — NICOTINE POLACRILEX 4 MG
15 LOZENGE BUCCAL PRN
Status: CANCELLED | OUTPATIENT
Start: 2017-01-01

## 2017-01-01 RX ORDER — NICOTINE POLACRILEX 4 MG
15 LOZENGE BUCCAL PRN
Status: DISCONTINUED | OUTPATIENT
Start: 2017-01-01 | End: 2017-01-01

## 2017-01-01 RX ORDER — SODIUM CHLORIDE 0.9 % (FLUSH) 0.9 %
10 SYRINGE (ML) INJECTION PRN
Status: DISCONTINUED | OUTPATIENT
Start: 2017-01-01 | End: 2017-01-01

## 2017-01-01 RX ORDER — QUETIAPINE FUMARATE 25 MG/1
12.5 TABLET, FILM COATED ORAL 4 TIMES DAILY PRN
Status: DISCONTINUED | OUTPATIENT
Start: 2017-01-01 | End: 2017-01-01 | Stop reason: HOSPADM

## 2017-01-01 RX ORDER — LIDOCAINE 50 MG/G
1 PATCH TOPICAL DAILY
Qty: 30 PATCH | Refills: 0 | Status: SHIPPED | OUTPATIENT
Start: 2017-01-01

## 2017-01-01 RX ORDER — FINASTERIDE 5 MG/1
5 TABLET, FILM COATED ORAL DAILY
Status: CANCELLED | OUTPATIENT
Start: 2017-01-01

## 2017-01-01 RX ORDER — LANOLIN ALCOHOL/MO/W.PET/CERES
9 CREAM (GRAM) TOPICAL NIGHTLY
Status: DISCONTINUED | OUTPATIENT
Start: 2017-01-01 | End: 2017-01-01 | Stop reason: HOSPADM

## 2017-01-01 RX ORDER — LANOLIN ALCOHOL/MO/W.PET/CERES
9 CREAM (GRAM) TOPICAL NIGHTLY
Status: DISCONTINUED | OUTPATIENT
Start: 2017-01-01 | End: 2017-01-01 | Stop reason: SDUPTHER

## 2017-01-01 RX ORDER — IPRATROPIUM BROMIDE AND ALBUTEROL SULFATE 2.5; .5 MG/3ML; MG/3ML
1 SOLUTION RESPIRATORY (INHALATION) 4 TIMES DAILY
Status: DISCONTINUED | OUTPATIENT
Start: 2017-01-01 | End: 2017-01-01

## 2017-01-01 RX ORDER — LANOLIN ALCOHOL/MO/W.PET/CERES
9 CREAM (GRAM) TOPICAL NIGHTLY
COMMUNITY

## 2017-01-01 RX ORDER — QUETIAPINE FUMARATE 25 MG/1
12.5 TABLET, FILM COATED ORAL 4 TIMES DAILY PRN
Status: CANCELLED | OUTPATIENT
Start: 2017-01-01

## 2017-01-01 RX ORDER — MIRTAZAPINE 15 MG/1
15 TABLET, FILM COATED ORAL NIGHTLY
COMMUNITY

## 2017-01-01 RX ORDER — ACETAMINOPHEN 500 MG
500 TABLET ORAL NIGHTLY
Status: DISCONTINUED | OUTPATIENT
Start: 2017-01-01 | End: 2017-01-01 | Stop reason: HOSPADM

## 2017-01-01 RX ORDER — BUSPIRONE HYDROCHLORIDE 10 MG/1
10 TABLET ORAL 2 TIMES DAILY
Status: CANCELLED | OUTPATIENT
Start: 2017-01-01

## 2017-01-01 RX ORDER — CEFDINIR 300 MG/1
300 CAPSULE ORAL EVERY 12 HOURS SCHEDULED
Status: DISCONTINUED | OUTPATIENT
Start: 2017-01-01 | End: 2017-01-01 | Stop reason: HOSPADM

## 2017-01-01 RX ORDER — MIDODRINE HYDROCHLORIDE 2.5 MG/1
2.5 TABLET ORAL
Status: DISCONTINUED | OUTPATIENT
Start: 2017-01-01 | End: 2017-01-01

## 2017-01-01 RX ORDER — BUSPIRONE HYDROCHLORIDE 10 MG/1
10 TABLET ORAL 2 TIMES DAILY
COMMUNITY

## 2017-01-01 RX ORDER — LUBIPROSTONE 24 UG/1
24 CAPSULE, GELATIN COATED ORAL 2 TIMES DAILY WITH MEALS
Status: DISCONTINUED | OUTPATIENT
Start: 2017-01-01 | End: 2017-01-01 | Stop reason: HOSPADM

## 2017-01-01 RX ORDER — METHYLPREDNISOLONE SODIUM SUCCINATE 40 MG/ML
40 INJECTION, POWDER, LYOPHILIZED, FOR SOLUTION INTRAMUSCULAR; INTRAVENOUS EVERY 8 HOURS
Status: DISCONTINUED | OUTPATIENT
Start: 2017-01-01 | End: 2017-01-01

## 2017-01-01 RX ORDER — LUBIPROSTONE 24 UG/1
24 CAPSULE, GELATIN COATED ORAL 2 TIMES DAILY WITH MEALS
Status: DISCONTINUED | OUTPATIENT
Start: 2017-01-01 | End: 2017-01-01 | Stop reason: RX

## 2017-01-01 RX ORDER — DEXTROSE MONOHYDRATE 50 MG/ML
100 INJECTION, SOLUTION INTRAVENOUS PRN
Status: CANCELLED | OUTPATIENT
Start: 2017-01-01

## 2017-01-01 RX ORDER — MIRTAZAPINE 15 MG/1
15 TABLET, FILM COATED ORAL NIGHTLY
Status: DISCONTINUED | OUTPATIENT
Start: 2017-01-01 | End: 2017-01-01

## 2017-01-01 RX ORDER — LIDOCAINE 50 MG/G
1 PATCH TOPICAL DAILY
Status: CANCELLED | OUTPATIENT
Start: 2017-01-01

## 2017-01-01 RX ORDER — PSEUDOEPHEDRINE HCL 30 MG
100 TABLET ORAL 2 TIMES DAILY
COMMUNITY
Start: 2017-01-01

## 2017-01-01 RX ORDER — LANOLIN ALCOHOL/MO/W.PET/CERES
9 CREAM (GRAM) TOPICAL NIGHTLY
Status: CANCELLED | OUTPATIENT
Start: 2017-01-01

## 2017-01-01 RX ORDER — ACETAMINOPHEN 325 MG/1
650 TABLET ORAL EVERY 4 HOURS PRN
Status: DISCONTINUED | OUTPATIENT
Start: 2017-01-01 | End: 2017-01-01 | Stop reason: SDUPTHER

## 2017-01-01 RX ORDER — SODIUM CHLORIDE 0.9 % (FLUSH) 0.9 %
10 SYRINGE (ML) INJECTION PRN
Status: CANCELLED | OUTPATIENT
Start: 2017-01-01

## 2017-01-01 RX ORDER — M-VIT,TX,IRON,MINS/CALC/FOLIC 27MG-0.4MG
1 TABLET ORAL DAILY
Status: CANCELLED | OUTPATIENT
Start: 2017-01-01

## 2017-01-01 RX ORDER — POLYETHYLENE GLYCOL 3350 17 G/17G
17 POWDER, FOR SOLUTION ORAL DAILY
Status: DISCONTINUED | OUTPATIENT
Start: 2017-01-01 | End: 2017-01-01 | Stop reason: HOSPADM

## 2017-01-01 RX ORDER — HYDROCODONE BITARTRATE AND ACETAMINOPHEN 5; 325 MG/1; MG/1
2 TABLET ORAL EVERY 4 HOURS PRN
Status: DISCONTINUED | OUTPATIENT
Start: 2017-01-01 | End: 2017-01-01

## 2017-01-01 RX ORDER — SODIUM CHLORIDE 0.9 % (FLUSH) 0.9 %
10 SYRINGE (ML) INJECTION PRN
Status: DISCONTINUED | OUTPATIENT
Start: 2017-01-01 | End: 2017-01-01 | Stop reason: HOSPADM

## 2017-01-01 RX ORDER — ATENOLOL 50 MG/1
50 TABLET ORAL DAILY
Qty: 30 TABLET | Refills: 0 | Status: SHIPPED | OUTPATIENT
Start: 2017-01-01

## 2017-01-01 RX ORDER — POTASSIUM CHLORIDE 750 MG/1
10 TABLET, EXTENDED RELEASE ORAL DAILY
COMMUNITY

## 2017-01-01 RX ORDER — DEXTROSE MONOHYDRATE 50 MG/ML
100 INJECTION, SOLUTION INTRAVENOUS PRN
Status: DISCONTINUED | OUTPATIENT
Start: 2017-01-01 | End: 2017-01-01

## 2017-01-01 RX ORDER — ASPIRIN 81 MG/1
81 TABLET ORAL DAILY
Status: CANCELLED | OUTPATIENT
Start: 2017-01-01

## 2017-01-01 RX ORDER — DOCUSATE SODIUM 100 MG/1
100 CAPSULE, LIQUID FILLED ORAL 2 TIMES DAILY
Status: CANCELLED | OUTPATIENT
Start: 2017-01-01

## 2017-01-01 RX ORDER — DEXTROSE MONOHYDRATE 25 G/50ML
12.5 INJECTION, SOLUTION INTRAVENOUS PRN
Status: DISCONTINUED | OUTPATIENT
Start: 2017-01-01 | End: 2017-01-01 | Stop reason: HOSPADM

## 2017-01-01 RX ORDER — CEFDINIR 300 MG/1
300 CAPSULE ORAL EVERY 12 HOURS SCHEDULED
Status: COMPLETED | OUTPATIENT
Start: 2017-01-01 | End: 2017-01-01

## 2017-01-01 RX ORDER — POLYETHYLENE GLYCOL 3350 17 G/17G
17 POWDER, FOR SOLUTION ORAL DAILY PRN
COMMUNITY

## 2017-01-01 RX ORDER — ACETAMINOPHEN 325 MG/1
650 TABLET ORAL EVERY 6 HOURS PRN
COMMUNITY

## 2017-01-01 RX ORDER — MIDODRINE HYDROCHLORIDE 5 MG/1
5 TABLET ORAL
Status: DISCONTINUED | OUTPATIENT
Start: 2017-01-01 | End: 2017-01-01 | Stop reason: HOSPADM

## 2017-01-01 RX ORDER — ACETAMINOPHEN 325 MG/1
650 TABLET ORAL EVERY 6 HOURS PRN
Status: CANCELLED | OUTPATIENT
Start: 2017-01-01

## 2017-01-01 RX ORDER — DEXTROSE MONOHYDRATE 25 G/50ML
12.5 INJECTION, SOLUTION INTRAVENOUS PRN
Status: DISCONTINUED | OUTPATIENT
Start: 2017-01-01 | End: 2017-01-01

## 2017-01-01 RX ORDER — ASPIRIN 81 MG/1
81 TABLET ORAL DAILY
Status: DISCONTINUED | OUTPATIENT
Start: 2017-01-01 | End: 2017-01-01 | Stop reason: HOSPADM

## 2017-01-01 RX ORDER — ATENOLOL 25 MG/1
25 TABLET ORAL ONCE
Status: COMPLETED | OUTPATIENT
Start: 2017-01-01 | End: 2017-01-01

## 2017-01-01 RX ORDER — MIRTAZAPINE 15 MG/1
15 TABLET, FILM COATED ORAL NIGHTLY
Status: ON HOLD | COMMUNITY
End: 2017-01-01 | Stop reason: ALTCHOICE

## 2017-01-01 RX ORDER — SODIUM CHLORIDE 0.9 % (FLUSH) 0.9 %
10 SYRINGE (ML) INJECTION EVERY 12 HOURS SCHEDULED
Status: DISCONTINUED | OUTPATIENT
Start: 2017-01-01 | End: 2017-01-01

## 2017-01-01 RX ORDER — LUBIPROSTONE 24 UG/1
24 CAPSULE, GELATIN COATED ORAL 2 TIMES DAILY WITH MEALS
Qty: 60 CAPSULE | Refills: 3 | Status: ON HOLD
Start: 2017-01-01 | End: 2017-01-01 | Stop reason: HOSPADM

## 2017-01-01 RX ORDER — MIDODRINE HYDROCHLORIDE 5 MG/1
5 TABLET ORAL
Qty: 90 TABLET | Refills: 3 | Status: SHIPPED | OUTPATIENT
Start: 2017-01-01

## 2017-01-01 RX ORDER — CEFDINIR 300 MG/1
300 CAPSULE ORAL EVERY 12 HOURS SCHEDULED
Status: CANCELLED | OUTPATIENT
Start: 2017-01-01 | End: 2017-01-01

## 2017-01-01 RX ORDER — POLYETHYLENE GLYCOL 3350 17 G/17G
17 POWDER, FOR SOLUTION ORAL DAILY
Status: CANCELLED | OUTPATIENT
Start: 2017-01-01

## 2017-01-01 RX ORDER — CLINDAMYCIN HYDROCHLORIDE 150 MG/1
300 CAPSULE ORAL 3 TIMES DAILY
Qty: 60 CAPSULE | Refills: 0 | Status: SHIPPED | OUTPATIENT
Start: 2017-01-01 | End: 2017-01-01

## 2017-01-01 RX ORDER — NICOTINE POLACRILEX 4 MG
15 LOZENGE BUCCAL PRN
Status: DISCONTINUED | OUTPATIENT
Start: 2017-01-01 | End: 2017-01-01 | Stop reason: HOSPADM

## 2017-01-01 RX ORDER — AZITHROMYCIN 250 MG/1
500 TABLET, FILM COATED ORAL EVERY 24 HOURS
Status: DISCONTINUED | OUTPATIENT
Start: 2017-01-01 | End: 2017-01-01

## 2017-01-01 RX ORDER — SODIUM CHLORIDE 0.9 % (FLUSH) 0.9 %
10 SYRINGE (ML) INJECTION EVERY 12 HOURS SCHEDULED
Status: CANCELLED | OUTPATIENT
Start: 2017-01-01

## 2017-01-01 RX ORDER — ATENOLOL 50 MG/1
50 TABLET ORAL DAILY
Status: CANCELLED | OUTPATIENT
Start: 2017-01-01

## 2017-01-01 RX ORDER — CHOLECALCIFEROL (VITAMIN D3) 125 MCG
9 CAPSULE ORAL DAILY
Status: ON HOLD | COMMUNITY
End: 2017-01-01

## 2017-01-01 RX ORDER — TAMSULOSIN HYDROCHLORIDE 0.4 MG/1
0.8 CAPSULE ORAL DAILY
Status: ON HOLD | COMMUNITY
End: 2017-01-01 | Stop reason: HOSPADM

## 2017-01-01 RX ADMIN — CEFTRIAXONE 1 G: 1 INJECTION, POWDER, FOR SOLUTION INTRAMUSCULAR; INTRAVENOUS at 16:13

## 2017-01-01 RX ADMIN — IPRATROPIUM BROMIDE AND ALBUTEROL SULFATE 1 AMPULE: .5; 3 SOLUTION RESPIRATORY (INHALATION) at 07:36

## 2017-01-01 RX ADMIN — IPRATROPIUM BROMIDE AND ALBUTEROL SULFATE 1 AMPULE: .5; 3 SOLUTION RESPIRATORY (INHALATION) at 16:57

## 2017-01-01 RX ADMIN — IPRATROPIUM BROMIDE AND ALBUTEROL SULFATE 1 AMPULE: .5; 3 SOLUTION RESPIRATORY (INHALATION) at 15:55

## 2017-01-01 RX ADMIN — ACETAMINOPHEN 650 MG: 325 TABLET ORAL at 08:20

## 2017-01-01 RX ADMIN — GUAIFENESIN 600 MG: 600 TABLET, EXTENDED RELEASE ORAL at 09:39

## 2017-01-01 RX ADMIN — BUSPIRONE HYDROCHLORIDE 10 MG: 10 TABLET ORAL at 16:31

## 2017-01-01 RX ADMIN — IPRATROPIUM BROMIDE AND ALBUTEROL SULFATE 1 AMPULE: .5; 3 SOLUTION RESPIRATORY (INHALATION) at 20:06

## 2017-01-01 RX ADMIN — IOPAMIDOL 80 ML: 755 INJECTION, SOLUTION INTRAVENOUS at 06:34

## 2017-01-01 RX ADMIN — IPRATROPIUM BROMIDE AND ALBUTEROL SULFATE 1 AMPULE: .5; 3 SOLUTION RESPIRATORY (INHALATION) at 11:34

## 2017-01-01 RX ADMIN — POTASSIUM CHLORIDE 10 MEQ: 750 TABLET, FILM COATED, EXTENDED RELEASE ORAL at 10:21

## 2017-01-01 RX ADMIN — BUSPIRONE HYDROCHLORIDE 10 MG: 10 TABLET ORAL at 08:21

## 2017-01-01 RX ADMIN — MIDODRINE HYDROCHLORIDE 5 MG: 5 TABLET ORAL at 16:31

## 2017-01-01 RX ADMIN — BUSPIRONE HYDROCHLORIDE 10 MG: 10 TABLET ORAL at 17:35

## 2017-01-01 RX ADMIN — MIDODRINE HYDROCHLORIDE 2.5 MG: 2.5 TABLET ORAL at 12:29

## 2017-01-01 RX ADMIN — BUSPIRONE HYDROCHLORIDE 10 MG: 10 TABLET ORAL at 18:48

## 2017-01-01 RX ADMIN — POTASSIUM CHLORIDE 10 MEQ: 750 TABLET, FILM COATED, EXTENDED RELEASE ORAL at 09:40

## 2017-01-01 RX ADMIN — FINASTERIDE 5 MG: 5 TABLET, FILM COATED ORAL at 09:11

## 2017-01-01 RX ADMIN — ASPIRIN 81 MG: 81 TABLET ORAL at 08:21

## 2017-01-01 RX ADMIN — BUSPIRONE HYDROCHLORIDE 10 MG: 10 TABLET ORAL at 10:22

## 2017-01-01 RX ADMIN — FINASTERIDE 5 MG: 5 TABLET, FILM COATED ORAL at 08:16

## 2017-01-01 RX ADMIN — ATENOLOL 50 MG: 50 TABLET ORAL at 08:11

## 2017-01-01 RX ADMIN — HYDROCODONE BITARTRATE AND ACETAMINOPHEN 2 TABLET: 5; 325 TABLET ORAL at 02:44

## 2017-01-01 RX ADMIN — MIDODRINE HYDROCHLORIDE 2.5 MG: 2.5 TABLET ORAL at 12:44

## 2017-01-01 RX ADMIN — IPRATROPIUM BROMIDE AND ALBUTEROL SULFATE 1 AMPULE: .5; 3 SOLUTION RESPIRATORY (INHALATION) at 20:22

## 2017-01-01 RX ADMIN — MIDODRINE HYDROCHLORIDE 2.5 MG: 2.5 TABLET ORAL at 08:39

## 2017-01-01 RX ADMIN — FINASTERIDE 5 MG: 5 TABLET, FILM COATED ORAL at 09:12

## 2017-01-01 RX ADMIN — POLYETHYLENE GLYCOL 3350 17 G: 17 POWDER, FOR SOLUTION ORAL at 09:02

## 2017-01-01 RX ADMIN — POTASSIUM CHLORIDE 10 MEQ: 750 TABLET, FILM COATED, EXTENDED RELEASE ORAL at 08:43

## 2017-01-01 RX ADMIN — ACETAMINOPHEN 650 MG: 325 TABLET ORAL at 06:00

## 2017-01-01 RX ADMIN — NITROFURANTOIN (MONOHYDRATE/MACROCRYSTALS) 100 MG: 75; 25 CAPSULE ORAL at 21:52

## 2017-01-01 RX ADMIN — MULTIPLE VITAMINS W/ MINERALS TAB 1 TABLET: TAB at 08:21

## 2017-01-01 RX ADMIN — QUETIAPINE FUMARATE 12.5 MG: 25 TABLET ORAL at 21:10

## 2017-01-01 RX ADMIN — IPRATROPIUM BROMIDE AND ALBUTEROL SULFATE 1 AMPULE: .5; 3 SOLUTION RESPIRATORY (INHALATION) at 14:05

## 2017-01-01 RX ADMIN — METHYLPREDNISOLONE SODIUM SUCCINATE 40 MG: 40 INJECTION, POWDER, FOR SOLUTION INTRAMUSCULAR; INTRAVENOUS at 12:03

## 2017-01-01 RX ADMIN — POLYETHYLENE GLYCOL 3350 17 G: 17 POWDER, FOR SOLUTION ORAL at 08:42

## 2017-01-01 RX ADMIN — POTASSIUM CHLORIDE 10 MEQ: 750 TABLET, FILM COATED, EXTENDED RELEASE ORAL at 09:26

## 2017-01-01 RX ADMIN — IPRATROPIUM BROMIDE AND ALBUTEROL SULFATE 1 AMPULE: .5; 3 SOLUTION RESPIRATORY (INHALATION) at 00:26

## 2017-01-01 RX ADMIN — DOCUSATE SODIUM 100 MG: 100 CAPSULE ORAL at 19:55

## 2017-01-01 RX ADMIN — POLYETHYLENE GLYCOL 3350 17 G: 17 POWDER, FOR SOLUTION ORAL at 09:43

## 2017-01-01 RX ADMIN — POTASSIUM CHLORIDE 10 MEQ: 750 TABLET, FILM COATED, EXTENDED RELEASE ORAL at 08:42

## 2017-01-01 RX ADMIN — ASPIRIN 81 MG: 81 TABLET ORAL at 09:01

## 2017-01-01 RX ADMIN — Medication 10 ML: at 19:55

## 2017-01-01 RX ADMIN — MIDODRINE HYDROCHLORIDE 5 MG: 5 TABLET ORAL at 15:36

## 2017-01-01 RX ADMIN — IPRATROPIUM BROMIDE AND ALBUTEROL SULFATE 1 AMPULE: .5; 3 SOLUTION RESPIRATORY (INHALATION) at 20:51

## 2017-01-01 RX ADMIN — DOCUSATE SODIUM 100 MG: 100 CAPSULE ORAL at 22:30

## 2017-01-01 RX ADMIN — MAGNESIUM HYDROXIDE 30 ML: 400 SUSPENSION ORAL at 09:24

## 2017-01-01 RX ADMIN — ASPIRIN 81 MG: 81 TABLET ORAL at 09:12

## 2017-01-01 RX ADMIN — Medication 3 UNITS: at 13:19

## 2017-01-01 RX ADMIN — DOCUSATE SODIUM 100 MG: 100 CAPSULE ORAL at 08:12

## 2017-01-01 RX ADMIN — IPRATROPIUM BROMIDE AND ALBUTEROL SULFATE 1 AMPULE: .5; 3 SOLUTION RESPIRATORY (INHALATION) at 17:03

## 2017-01-01 RX ADMIN — SODIUM CHLORIDE: 9 INJECTION, SOLUTION INTRAVENOUS at 08:20

## 2017-01-01 RX ADMIN — ACETAMINOPHEN 500 MG: 500 TABLET ORAL at 21:23

## 2017-01-01 RX ADMIN — IPRATROPIUM BROMIDE AND ALBUTEROL SULFATE 1 AMPULE: .5; 3 SOLUTION RESPIRATORY (INHALATION) at 11:49

## 2017-01-01 RX ADMIN — IPRATROPIUM BROMIDE AND ALBUTEROL SULFATE 1 AMPULE: .5; 3 SOLUTION RESPIRATORY (INHALATION) at 09:16

## 2017-01-01 RX ADMIN — MULTIPLE VITAMINS W/ MINERALS TAB 1 TABLET: TAB at 09:26

## 2017-01-01 RX ADMIN — MULTIPLE VITAMINS W/ MINERALS TAB 1 TABLET: TAB at 08:34

## 2017-01-01 RX ADMIN — CEFTRIAXONE 1 G: 1 INJECTION, POWDER, FOR SOLUTION INTRAMUSCULAR; INTRAVENOUS at 16:53

## 2017-01-01 RX ADMIN — Medication 9 MG: at 21:18

## 2017-01-01 RX ADMIN — IPRATROPIUM BROMIDE AND ALBUTEROL SULFATE 1 AMPULE: .5; 3 SOLUTION RESPIRATORY (INHALATION) at 14:31

## 2017-01-01 RX ADMIN — FINASTERIDE 5 MG: 5 TABLET, FILM COATED ORAL at 08:33

## 2017-01-01 RX ADMIN — FINASTERIDE 5 MG: 5 TABLET, FILM COATED ORAL at 08:42

## 2017-01-01 RX ADMIN — BUSPIRONE HYDROCHLORIDE 10 MG: 10 TABLET ORAL at 17:13

## 2017-01-01 RX ADMIN — IPRATROPIUM BROMIDE AND ALBUTEROL SULFATE 1 AMPULE: .5; 3 SOLUTION RESPIRATORY (INHALATION) at 21:17

## 2017-01-01 RX ADMIN — Medication 9 MG: at 20:45

## 2017-01-01 RX ADMIN — MIDODRINE HYDROCHLORIDE 5 MG: 5 TABLET ORAL at 12:40

## 2017-01-01 RX ADMIN — DOCUSATE SODIUM 100 MG: 100 CAPSULE ORAL at 20:45

## 2017-01-01 RX ADMIN — MIDODRINE HYDROCHLORIDE 2.5 MG: 2.5 TABLET ORAL at 12:51

## 2017-01-01 RX ADMIN — DOCUSATE SODIUM 100 MG: 100 CAPSULE ORAL at 09:11

## 2017-01-01 RX ADMIN — Medication 9 MG: at 21:54

## 2017-01-01 RX ADMIN — FINASTERIDE 5 MG: 5 TABLET, FILM COATED ORAL at 08:44

## 2017-01-01 RX ADMIN — ASPIRIN 81 MG: 81 TABLET ORAL at 08:44

## 2017-01-01 RX ADMIN — MIDODRINE HYDROCHLORIDE 2.5 MG: 2.5 TABLET ORAL at 17:13

## 2017-01-01 RX ADMIN — IPRATROPIUM BROMIDE AND ALBUTEROL SULFATE 1 AMPULE: .5; 3 SOLUTION RESPIRATORY (INHALATION) at 07:30

## 2017-01-01 RX ADMIN — BUSPIRONE HYDROCHLORIDE 10 MG: 10 TABLET ORAL at 09:01

## 2017-01-01 RX ADMIN — MULTIPLE VITAMINS W/ MINERALS TAB 1 TABLET: TAB at 08:15

## 2017-01-01 RX ADMIN — BUSPIRONE HYDROCHLORIDE 10 MG: 10 TABLET ORAL at 18:00

## 2017-01-01 RX ADMIN — Medication 9 MG: at 20:28

## 2017-01-01 RX ADMIN — ASPIRIN 81 MG: 81 TABLET ORAL at 09:30

## 2017-01-01 RX ADMIN — ATENOLOL 50 MG: 50 TABLET ORAL at 08:21

## 2017-01-01 RX ADMIN — ATENOLOL 50 MG: 50 TABLET ORAL at 09:13

## 2017-01-01 RX ADMIN — ENOXAPARIN SODIUM 40 MG: 40 INJECTION SUBCUTANEOUS at 08:21

## 2017-01-01 RX ADMIN — IPRATROPIUM BROMIDE AND ALBUTEROL SULFATE 1 AMPULE: .5; 3 SOLUTION RESPIRATORY (INHALATION) at 13:00

## 2017-01-01 RX ADMIN — BUSPIRONE HYDROCHLORIDE 10 MG: 10 TABLET ORAL at 08:53

## 2017-01-01 RX ADMIN — HYDROCODONE BITARTRATE AND ACETAMINOPHEN 1 TABLET: 5; 325 TABLET ORAL at 14:44

## 2017-01-01 RX ADMIN — BUSPIRONE HYDROCHLORIDE 10 MG: 10 TABLET ORAL at 09:40

## 2017-01-01 RX ADMIN — Medication 1 UNITS: at 11:19

## 2017-01-01 RX ADMIN — POTASSIUM CHLORIDE 10 MEQ: 750 TABLET, FILM COATED, EXTENDED RELEASE ORAL at 09:30

## 2017-01-01 RX ADMIN — ATENOLOL 50 MG: 50 TABLET ORAL at 09:30

## 2017-01-01 RX ADMIN — MIDODRINE HYDROCHLORIDE 2.5 MG: 2.5 TABLET ORAL at 16:46

## 2017-01-01 RX ADMIN — GUAIFENESIN 600 MG: 600 TABLET, EXTENDED RELEASE ORAL at 09:37

## 2017-01-01 RX ADMIN — ENOXAPARIN SODIUM 40 MG: 40 INJECTION SUBCUTANEOUS at 13:50

## 2017-01-01 RX ADMIN — POTASSIUM CHLORIDE 10 MEQ: 750 TABLET, FILM COATED, EXTENDED RELEASE ORAL at 08:21

## 2017-01-01 RX ADMIN — Medication 10 ML: at 09:45

## 2017-01-01 RX ADMIN — FINASTERIDE 5 MG: 5 TABLET, FILM COATED ORAL at 09:40

## 2017-01-01 RX ADMIN — IPRATROPIUM BROMIDE AND ALBUTEROL SULFATE 1 AMPULE: .5; 3 SOLUTION RESPIRATORY (INHALATION) at 15:48

## 2017-01-01 RX ADMIN — ACETAMINOPHEN 500 MG: 500 TABLET ORAL at 22:30

## 2017-01-01 RX ADMIN — METHYLPREDNISOLONE SODIUM SUCCINATE 40 MG: 40 INJECTION, POWDER, FOR SOLUTION INTRAMUSCULAR; INTRAVENOUS at 13:30

## 2017-01-01 RX ADMIN — NITROFURANTOIN (MONOHYDRATE/MACROCRYSTALS) 100 MG: 75; 25 CAPSULE ORAL at 21:22

## 2017-01-01 RX ADMIN — Medication 10 ML: at 15:53

## 2017-01-01 RX ADMIN — FINASTERIDE 5 MG: 5 TABLET, FILM COATED ORAL at 09:30

## 2017-01-01 RX ADMIN — QUETIAPINE FUMARATE 12.5 MG: 25 TABLET ORAL at 22:56

## 2017-01-01 RX ADMIN — BUSPIRONE HYDROCHLORIDE 10 MG: 10 TABLET ORAL at 17:07

## 2017-01-01 RX ADMIN — FINASTERIDE 5 MG: 5 TABLET, FILM COATED ORAL at 09:42

## 2017-01-01 RX ADMIN — QUETIAPINE FUMARATE 12.5 MG: 25 TABLET ORAL at 22:33

## 2017-01-01 RX ADMIN — METHYLPREDNISOLONE SODIUM SUCCINATE 40 MG: 40 INJECTION, POWDER, FOR SOLUTION INTRAMUSCULAR; INTRAVENOUS at 21:39

## 2017-01-01 RX ADMIN — ASPIRIN 81 MG: 81 TABLET ORAL at 09:36

## 2017-01-01 RX ADMIN — ENOXAPARIN SODIUM 40 MG: 40 INJECTION SUBCUTANEOUS at 10:22

## 2017-01-01 RX ADMIN — HYDROCODONE BITARTRATE AND ACETAMINOPHEN 2 TABLET: 5; 325 TABLET ORAL at 04:28

## 2017-01-01 RX ADMIN — QUETIAPINE FUMARATE 12.5 MG: 25 TABLET ORAL at 20:28

## 2017-01-01 RX ADMIN — POTASSIUM CHLORIDE 10 MEQ: 750 TABLET, FILM COATED, EXTENDED RELEASE ORAL at 09:37

## 2017-01-01 RX ADMIN — MIDODRINE HYDROCHLORIDE 2.5 MG: 2.5 TABLET ORAL at 13:19

## 2017-01-01 RX ADMIN — IPRATROPIUM BROMIDE AND ALBUTEROL SULFATE 1 AMPULE: .5; 3 SOLUTION RESPIRATORY (INHALATION) at 09:50

## 2017-01-01 RX ADMIN — BUSPIRONE HYDROCHLORIDE 10 MG: 10 TABLET ORAL at 16:46

## 2017-01-01 RX ADMIN — POLYETHYLENE GLYCOL 3350 17 G: 17 POWDER, FOR SOLUTION ORAL at 09:11

## 2017-01-01 RX ADMIN — BUSPIRONE HYDROCHLORIDE 10 MG: 10 TABLET ORAL at 16:58

## 2017-01-01 RX ADMIN — MIDODRINE HYDROCHLORIDE 5 MG: 5 TABLET ORAL at 09:01

## 2017-01-01 RX ADMIN — DOCUSATE SODIUM 100 MG: 100 CAPSULE ORAL at 08:53

## 2017-01-01 RX ADMIN — MIRTAZAPINE 15 MG: 15 TABLET, FILM COATED ORAL at 20:51

## 2017-01-01 RX ADMIN — DOCUSATE SODIUM 100 MG: 100 CAPSULE ORAL at 20:38

## 2017-01-01 RX ADMIN — IPRATROPIUM BROMIDE AND ALBUTEROL SULFATE 1 AMPULE: .5; 3 SOLUTION RESPIRATORY (INHALATION) at 20:09

## 2017-01-01 RX ADMIN — Medication 10 ML: at 21:18

## 2017-01-01 RX ADMIN — Medication 9 MG: at 21:21

## 2017-01-01 RX ADMIN — SODIUM CHLORIDE: 9 INJECTION, SOLUTION INTRAVENOUS at 20:06

## 2017-01-01 RX ADMIN — DOCUSATE SODIUM 100 MG: 100 CAPSULE ORAL at 20:27

## 2017-01-01 RX ADMIN — AZITHROMYCIN 500 MG: 250 TABLET, FILM COATED ORAL at 21:28

## 2017-01-01 RX ADMIN — IPRATROPIUM BROMIDE AND ALBUTEROL SULFATE 1 AMPULE: .5; 3 SOLUTION RESPIRATORY (INHALATION) at 12:00

## 2017-01-01 RX ADMIN — ENOXAPARIN SODIUM 40 MG: 40 INJECTION SUBCUTANEOUS at 08:15

## 2017-01-01 RX ADMIN — NITROFURANTOIN (MONOHYDRATE/MACROCRYSTALS) 100 MG: 75; 25 CAPSULE ORAL at 09:29

## 2017-01-01 RX ADMIN — MIDODRINE HYDROCHLORIDE 5 MG: 5 TABLET ORAL at 09:37

## 2017-01-01 RX ADMIN — IPRATROPIUM BROMIDE AND ALBUTEROL SULFATE 1 AMPULE: .5; 3 SOLUTION RESPIRATORY (INHALATION) at 14:32

## 2017-01-01 RX ADMIN — MIDODRINE HYDROCHLORIDE 5 MG: 5 TABLET ORAL at 13:31

## 2017-01-01 RX ADMIN — CEFDINIR 300 MG: 300 CAPSULE ORAL at 08:44

## 2017-01-01 RX ADMIN — IPRATROPIUM BROMIDE AND ALBUTEROL SULFATE 1 AMPULE: .5; 3 SOLUTION RESPIRATORY (INHALATION) at 08:42

## 2017-01-01 RX ADMIN — Medication 9 MG: at 21:52

## 2017-01-01 RX ADMIN — BUSPIRONE HYDROCHLORIDE 10 MG: 10 TABLET ORAL at 09:08

## 2017-01-01 RX ADMIN — MULTIPLE VITAMINS W/ MINERALS TAB 1 TABLET: TAB at 08:16

## 2017-01-01 RX ADMIN — QUETIAPINE FUMARATE 12.5 MG: 25 TABLET ORAL at 21:22

## 2017-01-01 RX ADMIN — MULTIPLE VITAMINS W/ MINERALS TAB 1 TABLET: TAB at 09:39

## 2017-01-01 RX ADMIN — QUETIAPINE FUMARATE 12.5 MG: 25 TABLET ORAL at 09:12

## 2017-01-01 RX ADMIN — DOCUSATE SODIUM 100 MG: 100 CAPSULE ORAL at 21:18

## 2017-01-01 RX ADMIN — DOCUSATE SODIUM 100 MG: 100 CAPSULE ORAL at 22:32

## 2017-01-01 RX ADMIN — Medication 10 ML: at 09:37

## 2017-01-01 RX ADMIN — POLYETHYLENE GLYCOL 3350 17 G: 17 POWDER, FOR SOLUTION ORAL at 09:35

## 2017-01-01 RX ADMIN — FINASTERIDE 5 MG: 5 TABLET, FILM COATED ORAL at 09:36

## 2017-01-01 RX ADMIN — ACETAMINOPHEN 650 MG: 325 TABLET ORAL at 08:35

## 2017-01-01 RX ADMIN — MIDODRINE HYDROCHLORIDE 5 MG: 5 TABLET ORAL at 09:08

## 2017-01-01 RX ADMIN — DOCUSATE SODIUM 100 MG: 100 CAPSULE ORAL at 20:51

## 2017-01-01 RX ADMIN — IPRATROPIUM BROMIDE AND ALBUTEROL SULFATE 1 AMPULE: .5; 3 SOLUTION RESPIRATORY (INHALATION) at 18:25

## 2017-01-01 RX ADMIN — ASPIRIN 81 MG: 81 TABLET ORAL at 08:49

## 2017-01-01 RX ADMIN — ENOXAPARIN SODIUM 40 MG: 40 INJECTION SUBCUTANEOUS at 09:57

## 2017-01-01 RX ADMIN — ASPIRIN 81 MG: 81 TABLET ORAL at 08:16

## 2017-01-01 RX ADMIN — IPRATROPIUM BROMIDE AND ALBUTEROL SULFATE 1 AMPULE: .5; 3 SOLUTION RESPIRATORY (INHALATION) at 09:25

## 2017-01-01 RX ADMIN — IPRATROPIUM BROMIDE AND ALBUTEROL SULFATE 1 AMPULE: .5; 3 SOLUTION RESPIRATORY (INHALATION) at 18:02

## 2017-01-01 RX ADMIN — IPRATROPIUM BROMIDE AND ALBUTEROL SULFATE 1 AMPULE: .5; 3 SOLUTION RESPIRATORY (INHALATION) at 17:25

## 2017-01-01 RX ADMIN — MIDODRINE HYDROCHLORIDE 5 MG: 5 TABLET ORAL at 17:07

## 2017-01-01 RX ADMIN — DOCUSATE SODIUM 100 MG: 100 CAPSULE ORAL at 08:21

## 2017-01-01 RX ADMIN — CEFDINIR 300 MG: 300 CAPSULE ORAL at 08:16

## 2017-01-01 RX ADMIN — QUETIAPINE FUMARATE 12.5 MG: 25 TABLET ORAL at 14:37

## 2017-01-01 RX ADMIN — ENOXAPARIN SODIUM 40 MG: 40 INJECTION SUBCUTANEOUS at 10:43

## 2017-01-01 RX ADMIN — ENOXAPARIN SODIUM 40 MG: 40 INJECTION SUBCUTANEOUS at 10:30

## 2017-01-01 RX ADMIN — DOCUSATE SODIUM 100 MG: 100 CAPSULE ORAL at 09:40

## 2017-01-01 RX ADMIN — POTASSIUM CHLORIDE 10 MEQ: 750 TABLET, FILM COATED, EXTENDED RELEASE ORAL at 08:33

## 2017-01-01 RX ADMIN — Medication 9 MG: at 00:48

## 2017-01-01 RX ADMIN — BUSPIRONE HYDROCHLORIDE 10 MG: 10 TABLET ORAL at 16:45

## 2017-01-01 RX ADMIN — IPRATROPIUM BROMIDE AND ALBUTEROL SULFATE 1 AMPULE: .5; 3 SOLUTION RESPIRATORY (INHALATION) at 07:51

## 2017-01-01 RX ADMIN — POTASSIUM CHLORIDE 10 MEQ: 750 TABLET, FILM COATED, EXTENDED RELEASE ORAL at 09:42

## 2017-01-01 RX ADMIN — CEFTRIAXONE 1 G: 1 INJECTION, POWDER, FOR SOLUTION INTRAMUSCULAR; INTRAVENOUS at 16:31

## 2017-01-01 RX ADMIN — DOCUSATE SODIUM 100 MG: 100 CAPSULE ORAL at 08:15

## 2017-01-01 RX ADMIN — Medication 10 ML: at 21:19

## 2017-01-01 RX ADMIN — IPRATROPIUM BROMIDE AND ALBUTEROL SULFATE 1 AMPULE: .5; 3 SOLUTION RESPIRATORY (INHALATION) at 12:09

## 2017-01-01 RX ADMIN — IPRATROPIUM BROMIDE AND ALBUTEROL SULFATE 1 AMPULE: .5; 3 SOLUTION RESPIRATORY (INHALATION) at 12:14

## 2017-01-01 RX ADMIN — ASPIRIN 81 MG: 81 TABLET ORAL at 08:35

## 2017-01-01 RX ADMIN — MIDODRINE HYDROCHLORIDE 2.5 MG: 2.5 TABLET ORAL at 08:49

## 2017-01-01 RX ADMIN — POTASSIUM CHLORIDE 10 MEQ: 750 TABLET, FILM COATED, EXTENDED RELEASE ORAL at 09:13

## 2017-01-01 RX ADMIN — DOCUSATE SODIUM 100 MG: 100 CAPSULE ORAL at 09:10

## 2017-01-01 RX ADMIN — IPRATROPIUM BROMIDE AND ALBUTEROL SULFATE 1 AMPULE: .5; 3 SOLUTION RESPIRATORY (INHALATION) at 18:00

## 2017-01-01 RX ADMIN — POTASSIUM CHLORIDE 10 MEQ: 750 TABLET, FILM COATED, EXTENDED RELEASE ORAL at 08:15

## 2017-01-01 RX ADMIN — MIRTAZAPINE 15 MG: 15 TABLET, FILM COATED ORAL at 21:39

## 2017-01-01 RX ADMIN — DOCUSATE SODIUM 100 MG: 100 CAPSULE ORAL at 21:36

## 2017-01-01 RX ADMIN — Medication 4.5 MG: at 21:58

## 2017-01-01 RX ADMIN — IPRATROPIUM BROMIDE AND ALBUTEROL SULFATE 1 AMPULE: .5; 3 SOLUTION RESPIRATORY (INHALATION) at 16:03

## 2017-01-01 RX ADMIN — FINASTERIDE 5 MG: 5 TABLET, FILM COATED ORAL at 10:22

## 2017-01-01 RX ADMIN — DOCUSATE SODIUM 100 MG: 100 CAPSULE ORAL at 20:29

## 2017-01-01 RX ADMIN — MIDODRINE HYDROCHLORIDE 5 MG: 5 TABLET ORAL at 12:30

## 2017-01-01 RX ADMIN — ACETAMINOPHEN 650 MG: 325 TABLET ORAL at 13:31

## 2017-01-01 RX ADMIN — FINASTERIDE 5 MG: 5 TABLET, FILM COATED ORAL at 09:37

## 2017-01-01 RX ADMIN — MIDODRINE HYDROCHLORIDE 2.5 MG: 2.5 TABLET ORAL at 09:11

## 2017-01-01 RX ADMIN — POTASSIUM CHLORIDE 10 MEQ: 750 TABLET, FILM COATED, EXTENDED RELEASE ORAL at 08:52

## 2017-01-01 RX ADMIN — ENOXAPARIN SODIUM 40 MG: 40 INJECTION SUBCUTANEOUS at 10:53

## 2017-01-01 RX ADMIN — MULTIPLE VITAMINS W/ MINERALS TAB 1 TABLET: TAB at 08:52

## 2017-01-01 RX ADMIN — Medication 9 MG: at 22:43

## 2017-01-01 RX ADMIN — AZITHROMYCIN 500 MG: 250 TABLET, FILM COATED ORAL at 00:42

## 2017-01-01 RX ADMIN — DOCUSATE SODIUM 100 MG: 100 CAPSULE ORAL at 21:32

## 2017-01-01 RX ADMIN — FINASTERIDE 5 MG: 5 TABLET, FILM COATED ORAL at 08:21

## 2017-01-01 RX ADMIN — Medication 1 UNITS: at 09:38

## 2017-01-01 RX ADMIN — Medication 10 ML: at 08:34

## 2017-01-01 RX ADMIN — POLYETHYLENE GLYCOL 3350 17 G: 17 POWDER, FOR SOLUTION ORAL at 08:35

## 2017-01-01 RX ADMIN — QUETIAPINE FUMARATE 12.5 MG: 25 TABLET ORAL at 23:11

## 2017-01-01 RX ADMIN — ACETAMINOPHEN 650 MG: 325 TABLET ORAL at 22:07

## 2017-01-01 RX ADMIN — IPRATROPIUM BROMIDE AND ALBUTEROL SULFATE 1 AMPULE: .5; 3 SOLUTION RESPIRATORY (INHALATION) at 21:23

## 2017-01-01 RX ADMIN — CEFDINIR 300 MG: 300 CAPSULE ORAL at 21:54

## 2017-01-01 RX ADMIN — Medication 10 ML: at 08:47

## 2017-01-01 RX ADMIN — IPRATROPIUM BROMIDE AND ALBUTEROL SULFATE 1 AMPULE: .5; 3 SOLUTION RESPIRATORY (INHALATION) at 09:13

## 2017-01-01 RX ADMIN — IPRATROPIUM BROMIDE AND ALBUTEROL SULFATE 1 AMPULE: .5; 3 SOLUTION RESPIRATORY (INHALATION) at 12:24

## 2017-01-01 RX ADMIN — BUSPIRONE HYDROCHLORIDE 10 MG: 10 TABLET ORAL at 09:11

## 2017-01-01 RX ADMIN — ASPIRIN 81 MG: 81 TABLET ORAL at 10:22

## 2017-01-01 RX ADMIN — DOCUSATE SODIUM 100 MG: 100 CAPSULE ORAL at 19:53

## 2017-01-01 RX ADMIN — CEFTRIAXONE 1 G: 1 INJECTION, POWDER, FOR SOLUTION INTRAMUSCULAR; INTRAVENOUS at 15:52

## 2017-01-01 RX ADMIN — IPRATROPIUM BROMIDE AND ALBUTEROL SULFATE 1 AMPULE: .5; 3 SOLUTION RESPIRATORY (INHALATION) at 13:44

## 2017-01-01 RX ADMIN — IPRATROPIUM BROMIDE AND ALBUTEROL SULFATE 1 AMPULE: .5; 3 SOLUTION RESPIRATORY (INHALATION) at 16:50

## 2017-01-01 RX ADMIN — IPRATROPIUM BROMIDE AND ALBUTEROL SULFATE 1 AMPULE: .5; 3 SOLUTION RESPIRATORY (INHALATION) at 19:13

## 2017-01-01 RX ADMIN — BUSPIRONE HYDROCHLORIDE 10 MG: 10 TABLET ORAL at 16:15

## 2017-01-01 RX ADMIN — DOCUSATE SODIUM 100 MG: 100 CAPSULE ORAL at 21:53

## 2017-01-01 RX ADMIN — Medication 9 MG: at 19:56

## 2017-01-01 RX ADMIN — BUSPIRONE HYDROCHLORIDE 10 MG: 10 TABLET ORAL at 09:30

## 2017-01-01 RX ADMIN — DOCUSATE SODIUM 100 MG: 100 CAPSULE ORAL at 19:57

## 2017-01-01 RX ADMIN — QUETIAPINE FUMARATE 12.5 MG: 25 TABLET ORAL at 04:06

## 2017-01-01 RX ADMIN — ACETAMINOPHEN 650 MG: 325 TABLET ORAL at 10:22

## 2017-01-01 RX ADMIN — MULTIPLE VITAMINS W/ MINERALS TAB 1 TABLET: TAB at 09:42

## 2017-01-01 RX ADMIN — Medication 10 ML: at 10:55

## 2017-01-01 RX ADMIN — ENOXAPARIN SODIUM 40 MG: 40 INJECTION SUBCUTANEOUS at 08:47

## 2017-01-01 RX ADMIN — FINASTERIDE 5 MG: 5 TABLET, FILM COATED ORAL at 09:39

## 2017-01-01 RX ADMIN — HYDROCODONE BITARTRATE AND ACETAMINOPHEN 1 TABLET: 5; 325 TABLET ORAL at 10:44

## 2017-01-01 RX ADMIN — IPRATROPIUM BROMIDE AND ALBUTEROL SULFATE 1 AMPULE: .5; 3 SOLUTION RESPIRATORY (INHALATION) at 20:15

## 2017-01-01 RX ADMIN — ENOXAPARIN SODIUM 40 MG: 40 INJECTION SUBCUTANEOUS at 09:16

## 2017-01-01 RX ADMIN — MIRTAZAPINE 15 MG: 15 TABLET, FILM COATED ORAL at 21:52

## 2017-01-01 RX ADMIN — QUETIAPINE FUMARATE 12.5 MG: 25 TABLET ORAL at 09:45

## 2017-01-01 RX ADMIN — Medication 9 MG: at 00:23

## 2017-01-01 RX ADMIN — QUETIAPINE FUMARATE 12.5 MG: 25 TABLET ORAL at 19:55

## 2017-01-01 RX ADMIN — MULTIPLE VITAMINS W/ MINERALS TAB 1 TABLET: TAB at 09:38

## 2017-01-01 RX ADMIN — IPRATROPIUM BROMIDE AND ALBUTEROL SULFATE 1 AMPULE: .5; 3 SOLUTION RESPIRATORY (INHALATION) at 07:56

## 2017-01-01 RX ADMIN — POTASSIUM CHLORIDE 10 MEQ: 750 TABLET, FILM COATED, EXTENDED RELEASE ORAL at 11:40

## 2017-01-01 RX ADMIN — FINASTERIDE 5 MG: 5 TABLET, FILM COATED ORAL at 09:10

## 2017-01-01 RX ADMIN — BUSPIRONE HYDROCHLORIDE 10 MG: 10 TABLET ORAL at 17:17

## 2017-01-01 RX ADMIN — IPRATROPIUM BROMIDE AND ALBUTEROL SULFATE 1 AMPULE: .5; 3 SOLUTION RESPIRATORY (INHALATION) at 22:02

## 2017-01-01 RX ADMIN — MIRTAZAPINE 15 MG: 15 TABLET, FILM COATED ORAL at 19:53

## 2017-01-01 RX ADMIN — BUSPIRONE HYDROCHLORIDE 10 MG: 10 TABLET ORAL at 18:44

## 2017-01-01 RX ADMIN — MIRTAZAPINE 15 MG: 15 TABLET, FILM COATED ORAL at 22:29

## 2017-01-01 RX ADMIN — MULTIPLE VITAMINS W/ MINERALS TAB 1 TABLET: TAB at 09:29

## 2017-01-01 RX ADMIN — POTASSIUM CHLORIDE 10 MEQ: 750 TABLET, FILM COATED, EXTENDED RELEASE ORAL at 09:11

## 2017-01-01 RX ADMIN — MIDODRINE HYDROCHLORIDE 5 MG: 5 TABLET ORAL at 08:33

## 2017-01-01 RX ADMIN — MIDODRINE HYDROCHLORIDE 2.5 MG: 2.5 TABLET ORAL at 17:00

## 2017-01-01 RX ADMIN — NITROFURANTOIN (MONOHYDRATE/MACROCRYSTALS) 100 MG: 75; 25 CAPSULE ORAL at 09:39

## 2017-01-01 RX ADMIN — MIDODRINE HYDROCHLORIDE 5 MG: 5 TABLET ORAL at 16:50

## 2017-01-01 RX ADMIN — MULTIPLE VITAMINS W/ MINERALS TAB 1 TABLET: TAB at 08:42

## 2017-01-01 RX ADMIN — GUAIFENESIN 600 MG: 600 TABLET, EXTENDED RELEASE ORAL at 21:23

## 2017-01-01 RX ADMIN — MIRTAZAPINE 15 MG: 15 TABLET, FILM COATED ORAL at 22:30

## 2017-01-01 RX ADMIN — HYDROCODONE BITARTRATE AND ACETAMINOPHEN 2 TABLET: 5; 325 TABLET ORAL at 05:07

## 2017-01-01 RX ADMIN — BUSPIRONE HYDROCHLORIDE 10 MG: 10 TABLET ORAL at 09:37

## 2017-01-01 RX ADMIN — DOCUSATE SODIUM 100 MG: 100 CAPSULE ORAL at 09:37

## 2017-01-01 RX ADMIN — POTASSIUM CHLORIDE 10 MEQ: 750 TABLET, FILM COATED, EXTENDED RELEASE ORAL at 09:02

## 2017-01-01 RX ADMIN — BUSPIRONE HYDROCHLORIDE 10 MG: 10 TABLET ORAL at 09:27

## 2017-01-01 RX ADMIN — POTASSIUM CHLORIDE 10 MEQ: 750 TABLET, FILM COATED, EXTENDED RELEASE ORAL at 09:39

## 2017-01-01 RX ADMIN — Medication 10 ML: at 09:24

## 2017-01-01 RX ADMIN — CEFDINIR 300 MG: 300 CAPSULE ORAL at 09:36

## 2017-01-01 RX ADMIN — BUSPIRONE HYDROCHLORIDE 10 MG: 10 TABLET ORAL at 16:18

## 2017-01-01 RX ADMIN — BUSPIRONE HYDROCHLORIDE 10 MG: 10 TABLET ORAL at 16:00

## 2017-01-01 RX ADMIN — Medication 9 MG: at 20:35

## 2017-01-01 RX ADMIN — CEFTRIAXONE 1 G: 1 INJECTION, POWDER, FOR SOLUTION INTRAMUSCULAR; INTRAVENOUS at 16:14

## 2017-01-01 RX ADMIN — DOCUSATE SODIUM 100 MG: 100 CAPSULE ORAL at 21:54

## 2017-01-01 RX ADMIN — BUSPIRONE HYDROCHLORIDE 10 MG: 10 TABLET ORAL at 08:35

## 2017-01-01 RX ADMIN — Medication 5 UNITS: at 09:37

## 2017-01-01 RX ADMIN — BUSPIRONE HYDROCHLORIDE 10 MG: 10 TABLET ORAL at 09:42

## 2017-01-01 RX ADMIN — IPRATROPIUM BROMIDE AND ALBUTEROL SULFATE 1 AMPULE: .5; 3 SOLUTION RESPIRATORY (INHALATION) at 10:24

## 2017-01-01 RX ADMIN — MIRTAZAPINE 15 MG: 15 TABLET, FILM COATED ORAL at 21:32

## 2017-01-01 RX ADMIN — POLYETHYLENE GLYCOL 3350 17 G: 17 POWDER, FOR SOLUTION ORAL at 09:26

## 2017-01-01 RX ADMIN — MIDODRINE HYDROCHLORIDE 5 MG: 5 TABLET ORAL at 13:35

## 2017-01-01 RX ADMIN — IPRATROPIUM BROMIDE AND ALBUTEROL SULFATE 1 AMPULE: .5; 3 SOLUTION RESPIRATORY (INHALATION) at 12:57

## 2017-01-01 RX ADMIN — QUETIAPINE FUMARATE 12.5 MG: 25 TABLET ORAL at 13:49

## 2017-01-01 RX ADMIN — ASPIRIN 81 MG: 81 TABLET ORAL at 08:42

## 2017-01-01 RX ADMIN — POLYETHYLENE GLYCOL 3350 17 G: 17 POWDER, FOR SOLUTION ORAL at 08:51

## 2017-01-01 RX ADMIN — Medication 10 ML: at 15:28

## 2017-01-01 RX ADMIN — ENOXAPARIN SODIUM 40 MG: 40 INJECTION SUBCUTANEOUS at 08:38

## 2017-01-01 RX ADMIN — Medication 1 UNITS: at 22:38

## 2017-01-01 RX ADMIN — CEFDINIR 300 MG: 300 CAPSULE ORAL at 20:46

## 2017-01-01 RX ADMIN — DOCUSATE SODIUM 100 MG: 100 CAPSULE ORAL at 08:49

## 2017-01-01 RX ADMIN — CEFDINIR 300 MG: 300 CAPSULE ORAL at 09:13

## 2017-01-01 RX ADMIN — Medication 9 MG: at 19:58

## 2017-01-01 RX ADMIN — FINASTERIDE 5 MG: 5 TABLET, FILM COATED ORAL at 08:35

## 2017-01-01 RX ADMIN — MIDODRINE HYDROCHLORIDE 5 MG: 5 TABLET ORAL at 13:22

## 2017-01-01 RX ADMIN — Medication 10 ML: at 19:53

## 2017-01-01 RX ADMIN — ASPIRIN 81 MG: 81 TABLET ORAL at 09:08

## 2017-01-01 RX ADMIN — DOCUSATE SODIUM 100 MG: 100 CAPSULE ORAL at 21:10

## 2017-01-01 RX ADMIN — QUETIAPINE FUMARATE 12.5 MG: 25 TABLET ORAL at 22:07

## 2017-01-01 RX ADMIN — MIDODRINE HYDROCHLORIDE 2.5 MG: 2.5 TABLET ORAL at 14:15

## 2017-01-01 RX ADMIN — SODIUM CHLORIDE: 9 INJECTION, SOLUTION INTRAVENOUS at 04:53

## 2017-01-01 RX ADMIN — INSULIN LISPRO 1 UNITS: 100 INJECTION, SOLUTION INTRAVENOUS; SUBCUTANEOUS at 21:59

## 2017-01-01 RX ADMIN — IPRATROPIUM BROMIDE AND ALBUTEROL SULFATE 1 AMPULE: .5; 3 SOLUTION RESPIRATORY (INHALATION) at 09:06

## 2017-01-01 RX ADMIN — Medication 10 ML: at 21:55

## 2017-01-01 RX ADMIN — MIDODRINE HYDROCHLORIDE 2.5 MG: 2.5 TABLET ORAL at 17:20

## 2017-01-01 RX ADMIN — MULTIPLE VITAMINS W/ MINERALS TAB 1 TABLET: TAB at 09:11

## 2017-01-01 RX ADMIN — GUAIFENESIN 600 MG: 600 TABLET, EXTENDED RELEASE ORAL at 09:29

## 2017-01-01 RX ADMIN — BUSPIRONE HYDROCHLORIDE 10 MG: 10 TABLET ORAL at 17:02

## 2017-01-01 RX ADMIN — POLYETHYLENE GLYCOL 3350 17 G: 17 POWDER, FOR SOLUTION ORAL at 09:13

## 2017-01-01 RX ADMIN — DOXYCYCLINE 100 MG: 100 INJECTION, POWDER, LYOPHILIZED, FOR SOLUTION INTRAVENOUS at 17:54

## 2017-01-01 RX ADMIN — ASPIRIN 81 MG: 81 TABLET ORAL at 08:51

## 2017-01-01 RX ADMIN — BUSPIRONE HYDROCHLORIDE 10 MG: 10 TABLET ORAL at 09:36

## 2017-01-01 RX ADMIN — CEFDINIR 300 MG: 300 CAPSULE ORAL at 08:11

## 2017-01-01 RX ADMIN — Medication 9 MG: at 20:36

## 2017-01-01 RX ADMIN — IPRATROPIUM BROMIDE AND ALBUTEROL SULFATE 1 AMPULE: .5; 3 SOLUTION RESPIRATORY (INHALATION) at 21:35

## 2017-01-01 RX ADMIN — DOCUSATE SODIUM 100 MG: 100 CAPSULE ORAL at 09:35

## 2017-01-01 RX ADMIN — QUETIAPINE FUMARATE 12.5 MG: 25 TABLET ORAL at 17:20

## 2017-01-01 RX ADMIN — Medication 9 MG: at 19:54

## 2017-01-01 RX ADMIN — ASPIRIN 81 MG: 81 TABLET ORAL at 08:11

## 2017-01-01 RX ADMIN — POTASSIUM CHLORIDE 10 MEQ: 750 TABLET, FILM COATED, EXTENDED RELEASE ORAL at 08:12

## 2017-01-01 RX ADMIN — IPRATROPIUM BROMIDE AND ALBUTEROL SULFATE 1 AMPULE: .5; 3 SOLUTION RESPIRATORY (INHALATION) at 13:56

## 2017-01-01 RX ADMIN — ATENOLOL 25 MG: 25 TABLET ORAL at 17:03

## 2017-01-01 RX ADMIN — MULTIPLE VITAMINS W/ MINERALS TAB 1 TABLET: TAB at 08:12

## 2017-01-01 RX ADMIN — ASPIRIN 81 MG: 81 TABLET ORAL at 09:28

## 2017-01-01 RX ADMIN — PREDNISONE 40 MG: 20 TABLET ORAL at 09:12

## 2017-01-01 RX ADMIN — GUAIFENESIN 600 MG: 600 TABLET, EXTENDED RELEASE ORAL at 22:30

## 2017-01-01 RX ADMIN — BUSPIRONE HYDROCHLORIDE 10 MG: 10 TABLET ORAL at 08:44

## 2017-01-01 RX ADMIN — FINASTERIDE 5 MG: 5 TABLET, FILM COATED ORAL at 08:53

## 2017-01-01 RX ADMIN — DOCUSATE SODIUM 100 MG: 100 CAPSULE ORAL at 20:19

## 2017-01-01 RX ADMIN — IPRATROPIUM BROMIDE AND ALBUTEROL SULFATE 1 AMPULE: .5; 3 SOLUTION RESPIRATORY (INHALATION) at 22:15

## 2017-01-01 RX ADMIN — ASPIRIN 81 MG: 81 TABLET ORAL at 08:39

## 2017-01-01 RX ADMIN — BUSPIRONE HYDROCHLORIDE 10 MG: 10 TABLET ORAL at 09:13

## 2017-01-01 RX ADMIN — AZITHROMYCIN MONOHYDRATE 500 MG: 500 INJECTION, POWDER, LYOPHILIZED, FOR SOLUTION INTRAVENOUS at 22:13

## 2017-01-01 RX ADMIN — CEFTRIAXONE 1 G: 1 INJECTION, POWDER, FOR SOLUTION INTRAMUSCULAR; INTRAVENOUS at 15:29

## 2017-01-01 RX ADMIN — MIDODRINE HYDROCHLORIDE 2.5 MG: 2.5 TABLET ORAL at 09:10

## 2017-01-01 RX ADMIN — ENOXAPARIN SODIUM 40 MG: 40 INJECTION SUBCUTANEOUS at 09:41

## 2017-01-01 RX ADMIN — MIDODRINE HYDROCHLORIDE 2.5 MG: 2.5 TABLET ORAL at 09:42

## 2017-01-01 RX ADMIN — ACETAMINOPHEN 500 MG: 500 TABLET ORAL at 21:32

## 2017-01-01 RX ADMIN — ASPIRIN 81 MG: 81 TABLET ORAL at 09:38

## 2017-01-01 RX ADMIN — POLYETHYLENE GLYCOL 3350 17 G: 17 POWDER, FOR SOLUTION ORAL at 08:21

## 2017-01-01 RX ADMIN — ASPIRIN 81 MG: 81 TABLET ORAL at 08:33

## 2017-01-01 RX ADMIN — MULTIPLE VITAMINS W/ MINERALS TAB 1 TABLET: TAB at 09:36

## 2017-01-01 RX ADMIN — METHYLPREDNISOLONE SODIUM SUCCINATE 40 MG: 40 INJECTION, POWDER, FOR SOLUTION INTRAMUSCULAR; INTRAVENOUS at 21:14

## 2017-01-01 RX ADMIN — MIDODRINE HYDROCHLORIDE 2.5 MG: 2.5 TABLET ORAL at 12:25

## 2017-01-01 RX ADMIN — ASPIRIN 81 MG: 81 TABLET ORAL at 09:42

## 2017-01-01 RX ADMIN — FINASTERIDE 5 MG: 5 TABLET, FILM COATED ORAL at 08:49

## 2017-01-01 RX ADMIN — POLYETHYLENE GLYCOL 3350 17 G: 17 POWDER, FOR SOLUTION ORAL at 09:10

## 2017-01-01 RX ADMIN — POLYETHYLENE GLYCOL 3350 17 G: 17 POWDER, FOR SOLUTION ORAL at 09:29

## 2017-01-01 RX ADMIN — IPRATROPIUM BROMIDE AND ALBUTEROL SULFATE 1 AMPULE: .5; 3 SOLUTION RESPIRATORY (INHALATION) at 19:37

## 2017-01-01 RX ADMIN — METHYLPREDNISOLONE SODIUM SUCCINATE 40 MG: 40 INJECTION, POWDER, FOR SOLUTION INTRAMUSCULAR; INTRAVENOUS at 14:51

## 2017-01-01 RX ADMIN — Medication 10 ML: at 15:35

## 2017-01-01 RX ADMIN — CEFDINIR 300 MG: 300 CAPSULE ORAL at 08:20

## 2017-01-01 RX ADMIN — DOCUSATE SODIUM 100 MG: 100 CAPSULE ORAL at 21:21

## 2017-01-01 RX ADMIN — NITROFURANTOIN (MONOHYDRATE/MACROCRYSTALS) 100 MG: 75; 25 CAPSULE ORAL at 09:02

## 2017-01-01 RX ADMIN — Medication 10 ML: at 22:03

## 2017-01-01 RX ADMIN — ATENOLOL 50 MG: 50 TABLET ORAL at 10:22

## 2017-01-01 RX ADMIN — Medication 10 ML: at 22:30

## 2017-01-01 RX ADMIN — BUSPIRONE HYDROCHLORIDE 10 MG: 10 TABLET ORAL at 08:49

## 2017-01-01 RX ADMIN — Medication 1 UNITS: at 20:01

## 2017-01-01 RX ADMIN — ATENOLOL 50 MG: 50 TABLET ORAL at 08:16

## 2017-01-01 RX ADMIN — IPRATROPIUM BROMIDE AND ALBUTEROL SULFATE 1 AMPULE: .5; 3 SOLUTION RESPIRATORY (INHALATION) at 08:12

## 2017-01-01 RX ADMIN — DOCUSATE SODIUM 100 MG: 100 CAPSULE ORAL at 09:42

## 2017-01-01 RX ADMIN — QUETIAPINE FUMARATE 12.5 MG: 25 TABLET ORAL at 00:22

## 2017-01-01 RX ADMIN — MIDODRINE HYDROCHLORIDE 2.5 MG: 2.5 TABLET ORAL at 12:36

## 2017-01-01 RX ADMIN — POLYETHYLENE GLYCOL 3350 17 G: 17 POWDER, FOR SOLUTION ORAL at 08:16

## 2017-01-01 RX ADMIN — ASPIRIN 81 MG: 81 TABLET ORAL at 09:11

## 2017-01-01 RX ADMIN — POLYETHYLENE GLYCOL 3350 17 G: 17 POWDER, FOR SOLUTION ORAL at 08:13

## 2017-01-01 RX ADMIN — FINASTERIDE 5 MG: 5 TABLET, FILM COATED ORAL at 09:29

## 2017-01-01 RX ADMIN — BUSPIRONE HYDROCHLORIDE 10 MG: 10 TABLET ORAL at 09:41

## 2017-01-01 RX ADMIN — NITROFURANTOIN (MONOHYDRATE/MACROCRYSTALS) 100 MG: 75; 25 CAPSULE ORAL at 21:32

## 2017-01-01 RX ADMIN — CEFDINIR 300 MG: 300 CAPSULE ORAL at 08:49

## 2017-01-01 RX ADMIN — Medication 9 MG: at 21:10

## 2017-01-01 RX ADMIN — IPRATROPIUM BROMIDE AND ALBUTEROL SULFATE 1 AMPULE: .5; 3 SOLUTION RESPIRATORY (INHALATION) at 08:46

## 2017-01-01 RX ADMIN — MIDODRINE HYDROCHLORIDE 5 MG: 5 TABLET ORAL at 17:00

## 2017-01-01 RX ADMIN — IPRATROPIUM BROMIDE AND ALBUTEROL SULFATE 1 AMPULE: .5; 3 SOLUTION RESPIRATORY (INHALATION) at 17:16

## 2017-01-01 RX ADMIN — MULTIPLE VITAMINS W/ MINERALS TAB 1 TABLET: TAB at 10:22

## 2017-01-01 RX ADMIN — ACETAMINOPHEN 500 MG: 500 TABLET ORAL at 21:52

## 2017-01-01 RX ADMIN — IPRATROPIUM BROMIDE AND ALBUTEROL SULFATE 1 AMPULE: .5; 3 SOLUTION RESPIRATORY (INHALATION) at 13:48

## 2017-01-01 RX ADMIN — CEFTRIAXONE 1 G: 1 INJECTION, POWDER, FOR SOLUTION INTRAMUSCULAR; INTRAVENOUS at 16:11

## 2017-01-01 RX ADMIN — MIDODRINE HYDROCHLORIDE 2.5 MG: 2.5 TABLET ORAL at 09:30

## 2017-01-01 RX ADMIN — BUSPIRONE HYDROCHLORIDE 10 MG: 10 TABLET ORAL at 22:30

## 2017-01-01 RX ADMIN — BUSPIRONE HYDROCHLORIDE 10 MG: 10 TABLET ORAL at 21:53

## 2017-01-01 RX ADMIN — DOCUSATE SODIUM 100 MG: 100 CAPSULE ORAL at 08:35

## 2017-01-01 RX ADMIN — DOCUSATE SODIUM 100 MG: 100 CAPSULE ORAL at 09:30

## 2017-01-01 RX ADMIN — DOCUSATE SODIUM 100 MG: 100 CAPSULE ORAL at 21:52

## 2017-01-01 RX ADMIN — ENOXAPARIN SODIUM 40 MG: 40 INJECTION SUBCUTANEOUS at 09:25

## 2017-01-01 RX ADMIN — NITROFURANTOIN (MONOHYDRATE/MACROCRYSTALS) 100 MG: 75; 25 CAPSULE ORAL at 09:37

## 2017-01-01 RX ADMIN — MIDODRINE HYDROCHLORIDE 2.5 MG: 2.5 TABLET ORAL at 17:59

## 2017-01-01 RX ADMIN — IPRATROPIUM BROMIDE AND ALBUTEROL SULFATE 1 AMPULE: .5; 3 SOLUTION RESPIRATORY (INHALATION) at 19:59

## 2017-01-01 RX ADMIN — BUSPIRONE HYDROCHLORIDE 10 MG: 10 TABLET ORAL at 17:20

## 2017-01-01 RX ADMIN — NITROFURANTOIN (MONOHYDRATE/MACROCRYSTALS) 100 MG: 75; 25 CAPSULE ORAL at 22:30

## 2017-01-01 RX ADMIN — ATENOLOL 50 MG: 50 TABLET ORAL at 08:49

## 2017-01-01 RX ADMIN — MULTIPLE VITAMINS W/ MINERALS TAB 1 TABLET: TAB at 08:35

## 2017-01-01 RX ADMIN — IPRATROPIUM BROMIDE AND ALBUTEROL SULFATE 1 AMPULE: .5; 3 SOLUTION RESPIRATORY (INHALATION) at 13:32

## 2017-01-01 RX ADMIN — CEFDINIR 300 MG: 300 CAPSULE ORAL at 21:35

## 2017-01-01 RX ADMIN — ENOXAPARIN SODIUM 40 MG: 40 INJECTION SUBCUTANEOUS at 08:54

## 2017-01-01 RX ADMIN — IPRATROPIUM BROMIDE AND ALBUTEROL SULFATE 1 AMPULE: .5; 3 SOLUTION RESPIRATORY (INHALATION) at 15:34

## 2017-01-01 RX ADMIN — Medication 10 ML: at 19:58

## 2017-01-01 RX ADMIN — MIDODRINE HYDROCHLORIDE 2.5 MG: 2.5 TABLET ORAL at 17:49

## 2017-01-01 RX ADMIN — IPRATROPIUM BROMIDE AND ALBUTEROL SULFATE 1 AMPULE: .5; 3 SOLUTION RESPIRATORY (INHALATION) at 09:34

## 2017-01-01 RX ADMIN — QUETIAPINE FUMARATE 12.5 MG: 25 TABLET ORAL at 20:46

## 2017-01-01 RX ADMIN — Medication 9 MG: at 21:55

## 2017-01-01 RX ADMIN — MIDODRINE HYDROCHLORIDE 5 MG: 5 TABLET ORAL at 12:22

## 2017-01-01 RX ADMIN — ATENOLOL 50 MG: 50 TABLET ORAL at 08:15

## 2017-01-01 RX ADMIN — MIDODRINE HYDROCHLORIDE 2.5 MG: 2.5 TABLET ORAL at 13:00

## 2017-01-01 RX ADMIN — MIDODRINE HYDROCHLORIDE 2.5 MG: 2.5 TABLET ORAL at 13:06

## 2017-01-01 RX ADMIN — MIDODRINE HYDROCHLORIDE 5 MG: 5 TABLET ORAL at 12:12

## 2017-01-01 RX ADMIN — MIDODRINE HYDROCHLORIDE 2.5 MG: 2.5 TABLET ORAL at 18:00

## 2017-01-01 RX ADMIN — ATENOLOL 50 MG: 50 TABLET ORAL at 08:35

## 2017-01-01 RX ADMIN — MIRTAZAPINE 15 MG: 15 TABLET, FILM COATED ORAL at 00:47

## 2017-01-01 RX ADMIN — MIDODRINE HYDROCHLORIDE 2.5 MG: 2.5 TABLET ORAL at 16:18

## 2017-01-01 RX ADMIN — Medication 10 ML: at 08:16

## 2017-01-01 RX ADMIN — BUSPIRONE HYDROCHLORIDE 10 MG: 10 TABLET ORAL at 16:12

## 2017-01-01 RX ADMIN — CEFDINIR 300 MG: 300 CAPSULE ORAL at 20:35

## 2017-01-01 RX ADMIN — POTASSIUM CHLORIDE 10 MEQ: 750 TABLET, FILM COATED, EXTENDED RELEASE ORAL at 09:29

## 2017-01-01 RX ADMIN — MIDODRINE HYDROCHLORIDE 2.5 MG: 2.5 TABLET ORAL at 08:44

## 2017-01-01 RX ADMIN — SODIUM CHLORIDE: 9 INJECTION, SOLUTION INTRAVENOUS at 22:31

## 2017-01-01 RX ADMIN — SODIUM CHLORIDE: 9 INJECTION, SOLUTION INTRAVENOUS at 19:01

## 2017-01-01 RX ADMIN — POLYETHYLENE GLYCOL 3350 17 G: 17 POWDER, FOR SOLUTION ORAL at 08:39

## 2017-01-01 RX ADMIN — BUSPIRONE HYDROCHLORIDE 10 MG: 10 TABLET ORAL at 09:29

## 2017-01-01 RX ADMIN — ATENOLOL 50 MG: 50 TABLET ORAL at 09:42

## 2017-01-01 RX ADMIN — Medication 10 ML: at 20:51

## 2017-01-01 RX ADMIN — HYDROCODONE BITARTRATE AND ACETAMINOPHEN 2 TABLET: 5; 325 TABLET ORAL at 19:53

## 2017-01-01 RX ADMIN — FINASTERIDE 5 MG: 5 TABLET, FILM COATED ORAL at 09:26

## 2017-01-01 RX ADMIN — HYDROCODONE BITARTRATE AND ACETAMINOPHEN 2 TABLET: 5; 325 TABLET ORAL at 00:09

## 2017-01-01 RX ADMIN — BUSPIRONE HYDROCHLORIDE 10 MG: 10 TABLET ORAL at 15:32

## 2017-01-01 RX ADMIN — BUSPIRONE HYDROCHLORIDE 10 MG: 10 TABLET ORAL at 16:59

## 2017-01-01 RX ADMIN — MIDODRINE HYDROCHLORIDE 2.5 MG: 2.5 TABLET ORAL at 17:02

## 2017-01-01 RX ADMIN — GUAIFENESIN 600 MG: 600 TABLET, EXTENDED RELEASE ORAL at 21:52

## 2017-01-01 RX ADMIN — AZITHROMYCIN 500 MG: 250 TABLET, FILM COATED ORAL at 00:10

## 2017-01-01 RX ADMIN — Medication 10 ML: at 08:25

## 2017-01-01 RX ADMIN — Medication 4.5 MG: at 22:31

## 2017-01-01 RX ADMIN — MIDODRINE HYDROCHLORIDE 5 MG: 5 TABLET ORAL at 09:39

## 2017-01-01 RX ADMIN — DOCUSATE SODIUM 100 MG: 100 CAPSULE ORAL at 20:35

## 2017-01-01 RX ADMIN — IPRATROPIUM BROMIDE AND ALBUTEROL SULFATE 1 AMPULE: .5; 3 SOLUTION RESPIRATORY (INHALATION) at 08:10

## 2017-01-01 RX ADMIN — ASPIRIN 81 MG: 81 TABLET ORAL at 09:29

## 2017-01-01 RX ADMIN — CEFDINIR 300 MG: 300 CAPSULE ORAL at 19:57

## 2017-01-01 RX ADMIN — MIDODRINE HYDROCHLORIDE 5 MG: 5 TABLET ORAL at 17:32

## 2017-01-01 RX ADMIN — MIDODRINE HYDROCHLORIDE 2.5 MG: 2.5 TABLET ORAL at 16:59

## 2017-01-01 RX ADMIN — DOCUSATE SODIUM 100 MG: 100 CAPSULE ORAL at 09:01

## 2017-01-01 RX ADMIN — MIDODRINE HYDROCHLORIDE 2.5 MG: 2.5 TABLET ORAL at 13:57

## 2017-01-01 RX ADMIN — MIRTAZAPINE 15 MG: 15 TABLET, FILM COATED ORAL at 21:17

## 2017-01-01 RX ADMIN — HYDROCODONE BITARTRATE AND ACETAMINOPHEN 1 TABLET: 5; 325 TABLET ORAL at 13:14

## 2017-01-01 RX ADMIN — IPRATROPIUM BROMIDE AND ALBUTEROL SULFATE 1 AMPULE: .5; 3 SOLUTION RESPIRATORY (INHALATION) at 15:51

## 2017-01-01 RX ADMIN — MULTIPLE VITAMINS W/ MINERALS TAB 1 TABLET: TAB at 09:12

## 2017-01-01 RX ADMIN — DOCUSATE SODIUM 100 MG: 100 CAPSULE ORAL at 08:44

## 2017-01-01 RX ADMIN — FINASTERIDE 5 MG: 5 TABLET, FILM COATED ORAL at 09:02

## 2017-01-01 RX ADMIN — AZITHROMYCIN 500 MG: 250 TABLET, FILM COATED ORAL at 22:42

## 2017-01-01 RX ADMIN — BUSPIRONE HYDROCHLORIDE 10 MG: 10 TABLET ORAL at 15:36

## 2017-01-01 RX ADMIN — DOCUSATE SODIUM 100 MG: 100 CAPSULE ORAL at 00:47

## 2017-01-01 RX ADMIN — QUETIAPINE FUMARATE 12.5 MG: 25 TABLET ORAL at 13:03

## 2017-01-01 RX ADMIN — MIDODRINE HYDROCHLORIDE 2.5 MG: 2.5 TABLET ORAL at 09:36

## 2017-01-01 RX ADMIN — FINASTERIDE 5 MG: 5 TABLET, FILM COATED ORAL at 08:11

## 2017-01-01 RX ADMIN — PREDNISONE 40 MG: 20 TABLET ORAL at 08:16

## 2017-01-01 RX ADMIN — CEFTRIAXONE 1 G: 1 INJECTION, POWDER, FOR SOLUTION INTRAMUSCULAR; INTRAVENOUS at 15:35

## 2017-01-01 RX ADMIN — DOCUSATE SODIUM 100 MG: 100 CAPSULE ORAL at 20:36

## 2017-01-01 RX ADMIN — MIRTAZAPINE 15 MG: 15 TABLET, FILM COATED ORAL at 21:24

## 2017-01-01 RX ADMIN — MIDODRINE HYDROCHLORIDE 2.5 MG: 2.5 TABLET ORAL at 08:11

## 2017-01-01 RX ADMIN — MIDODRINE HYDROCHLORIDE 2.5 MG: 2.5 TABLET ORAL at 10:22

## 2017-01-01 RX ADMIN — DOCUSATE SODIUM 100 MG: 100 CAPSULE ORAL at 10:21

## 2017-01-01 RX ADMIN — Medication 10 ML: at 13:44

## 2017-01-01 RX ADMIN — QUETIAPINE FUMARATE 12.5 MG: 25 TABLET ORAL at 09:58

## 2017-01-01 RX ADMIN — DOCUSATE SODIUM 100 MG: 100 CAPSULE ORAL at 08:42

## 2017-01-01 RX ADMIN — MIDODRINE HYDROCHLORIDE 2.5 MG: 2.5 TABLET ORAL at 16:58

## 2017-01-01 RX ADMIN — MIDODRINE HYDROCHLORIDE 2.5 MG: 2.5 TABLET ORAL at 08:35

## 2017-01-01 RX ADMIN — ATENOLOL 50 MG: 50 TABLET ORAL at 09:11

## 2017-01-01 RX ADMIN — Medication 9 MG: at 20:30

## 2017-01-01 RX ADMIN — MULTIPLE VITAMINS W/ MINERALS TAB 1 TABLET: TAB at 08:49

## 2017-01-01 RX ADMIN — IPRATROPIUM BROMIDE AND ALBUTEROL SULFATE 1 AMPULE: .5; 3 SOLUTION RESPIRATORY (INHALATION) at 10:27

## 2017-01-01 RX ADMIN — POTASSIUM CHLORIDE 10 MEQ: 750 TABLET, FILM COATED, EXTENDED RELEASE ORAL at 08:16

## 2017-01-01 RX ADMIN — ENOXAPARIN SODIUM 40 MG: 40 INJECTION SUBCUTANEOUS at 09:45

## 2017-01-01 RX ADMIN — BUSPIRONE HYDROCHLORIDE 10 MG: 10 TABLET ORAL at 08:12

## 2017-01-01 RX ADMIN — BUSPIRONE HYDROCHLORIDE 10 MG: 10 TABLET ORAL at 08:15

## 2017-01-01 RX ADMIN — Medication 4.5 MG: at 21:25

## 2017-01-01 RX ADMIN — QUETIAPINE FUMARATE 12.5 MG: 25 TABLET ORAL at 00:34

## 2017-01-01 RX ADMIN — IPRATROPIUM BROMIDE AND ALBUTEROL SULFATE 1 AMPULE: .5; 3 SOLUTION RESPIRATORY (INHALATION) at 21:56

## 2017-01-01 RX ADMIN — MIDODRINE HYDROCHLORIDE 5 MG: 5 TABLET ORAL at 12:52

## 2017-01-01 RX ADMIN — ASPIRIN 81 MG: 81 TABLET ORAL at 09:37

## 2017-01-01 RX ADMIN — ENOXAPARIN SODIUM 40 MG: 40 INJECTION SUBCUTANEOUS at 08:12

## 2017-01-01 RX ADMIN — MIDODRINE HYDROCHLORIDE 2.5 MG: 2.5 TABLET ORAL at 08:22

## 2017-01-01 RX ADMIN — MIDODRINE HYDROCHLORIDE 2.5 MG: 2.5 TABLET ORAL at 09:13

## 2017-01-01 RX ADMIN — MIDODRINE HYDROCHLORIDE 2.5 MG: 2.5 TABLET ORAL at 18:28

## 2017-01-01 RX ADMIN — ENOXAPARIN SODIUM 40 MG: 40 INJECTION SUBCUTANEOUS at 09:42

## 2017-01-01 RX ADMIN — AZITHROMYCIN MONOHYDRATE 500 MG: 500 INJECTION, POWDER, LYOPHILIZED, FOR SOLUTION INTRAVENOUS at 22:29

## 2017-01-01 RX ADMIN — BUSPIRONE HYDROCHLORIDE 10 MG: 10 TABLET ORAL at 16:50

## 2017-01-01 RX ADMIN — MULTIPLE VITAMINS W/ MINERALS TAB 1 TABLET: TAB at 09:10

## 2017-01-01 RX ADMIN — ENOXAPARIN SODIUM 40 MG: 40 INJECTION SUBCUTANEOUS at 09:33

## 2017-01-01 RX ADMIN — POLYETHYLENE GLYCOL 3350 17 G: 17 POWDER, FOR SOLUTION ORAL at 08:33

## 2017-01-01 RX ADMIN — BUSPIRONE HYDROCHLORIDE 10 MG: 10 TABLET ORAL at 08:42

## 2017-01-01 RX ADMIN — ACETAMINOPHEN 650 MG: 325 TABLET ORAL at 00:22

## 2017-01-01 RX ADMIN — POTASSIUM CHLORIDE 10 MEQ: 750 TABLET, FILM COATED, EXTENDED RELEASE ORAL at 08:49

## 2017-01-01 RX ADMIN — FINASTERIDE 5 MG: 5 TABLET, FILM COATED ORAL at 08:39

## 2017-01-01 RX ADMIN — LISINOPRIL 20 MG: 20 TABLET ORAL at 03:39

## 2017-01-01 RX ADMIN — SODIUM CHLORIDE: 9 INJECTION, SOLUTION INTRAVENOUS at 16:50

## 2017-01-01 RX ADMIN — Medication 9 MG: at 21:16

## 2017-01-01 RX ADMIN — Medication 9 MG: at 21:36

## 2017-01-01 RX ADMIN — ATENOLOL 50 MG: 50 TABLET ORAL at 09:36

## 2017-01-01 RX ADMIN — POTASSIUM CHLORIDE 10 MEQ: 750 TABLET, FILM COATED, EXTENDED RELEASE ORAL at 09:10

## 2017-01-01 RX ADMIN — IPRATROPIUM BROMIDE AND ALBUTEROL SULFATE 1 AMPULE: .5; 3 SOLUTION RESPIRATORY (INHALATION) at 17:48

## 2017-01-01 RX ADMIN — SODIUM CHLORIDE 500 ML: 9 INJECTION, SOLUTION INTRAVENOUS at 14:35

## 2017-01-01 RX ADMIN — POTASSIUM CHLORIDE 10 MEQ: 750 TABLET, FILM COATED, EXTENDED RELEASE ORAL at 09:36

## 2017-01-01 RX ADMIN — DOCUSATE SODIUM 100 MG: 100 CAPSULE ORAL at 08:33

## 2017-01-01 RX ADMIN — MULTIPLE VITAMINS W/ MINERALS TAB 1 TABLET: TAB at 09:02

## 2017-01-01 RX ADMIN — Medication 9 MG: at 22:32

## 2017-01-01 RX ADMIN — ENOXAPARIN SODIUM 40 MG: 40 INJECTION SUBCUTANEOUS at 09:31

## 2017-01-01 RX ADMIN — MIDODRINE HYDROCHLORIDE 5 MG: 5 TABLET ORAL at 12:46

## 2017-01-01 RX ADMIN — ENOXAPARIN SODIUM 40 MG: 40 INJECTION SUBCUTANEOUS at 08:29

## 2017-01-01 RX ADMIN — DOCUSATE SODIUM 100 MG: 100 CAPSULE ORAL at 09:38

## 2017-01-01 RX ADMIN — ATENOLOL 50 MG: 50 TABLET ORAL at 08:39

## 2017-01-01 RX ADMIN — Medication 9 MG: at 20:51

## 2017-01-01 RX ADMIN — BUSPIRONE HYDROCHLORIDE 10 MG: 10 TABLET ORAL at 16:40

## 2017-01-01 RX ADMIN — DOCUSATE SODIUM 100 MG: 100 CAPSULE ORAL at 08:16

## 2017-01-01 RX ADMIN — IPRATROPIUM BROMIDE AND ALBUTEROL SULFATE 1 AMPULE: .5; 3 SOLUTION RESPIRATORY (INHALATION) at 07:22

## 2017-01-01 RX ADMIN — MULTIPLE VITAMINS W/ MINERALS TAB 1 TABLET: TAB at 08:43

## 2017-01-01 RX ADMIN — IPRATROPIUM BROMIDE AND ALBUTEROL SULFATE 1 AMPULE: .5; 3 SOLUTION RESPIRATORY (INHALATION) at 22:05

## 2017-01-01 RX ADMIN — METHYLPREDNISOLONE SODIUM SUCCINATE 40 MG: 40 INJECTION, POWDER, FOR SOLUTION INTRAMUSCULAR; INTRAVENOUS at 06:36

## 2017-01-01 RX ADMIN — BUSPIRONE HYDROCHLORIDE 10 MG: 10 TABLET ORAL at 08:16

## 2017-01-01 RX ADMIN — IPRATROPIUM BROMIDE AND ALBUTEROL SULFATE 1 AMPULE: .5; 3 SOLUTION RESPIRATORY (INHALATION) at 16:14

## 2017-01-01 RX ADMIN — MIDODRINE HYDROCHLORIDE 2.5 MG: 2.5 TABLET ORAL at 18:44

## 2017-01-01 RX ADMIN — DOCUSATE SODIUM 100 MG: 100 CAPSULE ORAL at 08:39

## 2017-01-01 RX ADMIN — MULTIPLE VITAMINS W/ MINERALS TAB 1 TABLET: TAB at 08:39

## 2017-01-01 RX ADMIN — IPRATROPIUM BROMIDE AND ALBUTEROL SULFATE 1 AMPULE: .5; 3 SOLUTION RESPIRATORY (INHALATION) at 17:30

## 2017-01-01 RX ADMIN — MIDODRINE HYDROCHLORIDE 5 MG: 5 TABLET ORAL at 08:42

## 2017-01-01 RX ADMIN — BUSPIRONE HYDROCHLORIDE 10 MG: 10 TABLET ORAL at 08:33

## 2017-01-01 RX ADMIN — DOCUSATE SODIUM 100 MG: 100 CAPSULE ORAL at 21:14

## 2017-01-01 RX ADMIN — MIDODRINE HYDROCHLORIDE 2.5 MG: 2.5 TABLET ORAL at 13:49

## 2017-01-01 RX ADMIN — ATENOLOL 25 MG: 25 TABLET ORAL at 10:28

## 2017-01-01 RX ADMIN — MIDODRINE HYDROCHLORIDE 2.5 MG: 2.5 TABLET ORAL at 13:32

## 2017-01-01 RX ADMIN — MIDODRINE HYDROCHLORIDE 2.5 MG: 2.5 TABLET ORAL at 13:58

## 2017-01-01 RX ADMIN — SODIUM CHLORIDE: 9 INJECTION, SOLUTION INTRAVENOUS at 21:35

## 2017-01-01 RX ADMIN — IPRATROPIUM BROMIDE AND ALBUTEROL SULFATE 1 AMPULE: .5; 3 SOLUTION RESPIRATORY (INHALATION) at 08:37

## 2017-01-01 RX ADMIN — SODIUM CHLORIDE: 9 INJECTION, SOLUTION INTRAVENOUS at 09:12

## 2017-01-01 RX ADMIN — Medication 9 MG: at 20:19

## 2017-01-01 RX ADMIN — QUETIAPINE FUMARATE 12.5 MG: 25 TABLET ORAL at 23:37

## 2017-01-01 RX ADMIN — BUSPIRONE HYDROCHLORIDE 10 MG: 10 TABLET ORAL at 17:00

## 2017-01-01 RX ADMIN — MIDODRINE HYDROCHLORIDE 2.5 MG: 2.5 TABLET ORAL at 08:16

## 2017-01-01 RX ADMIN — DOCUSATE SODIUM 100 MG: 100 CAPSULE ORAL at 09:12

## 2017-01-01 RX ADMIN — VANCOMYCIN HYDROCHLORIDE 1000 MG: 1 INJECTION, POWDER, LYOPHILIZED, FOR SOLUTION INTRAVENOUS at 00:17

## 2017-01-01 RX ADMIN — DOCUSATE SODIUM 100 MG: 100 CAPSULE ORAL at 09:27

## 2017-01-01 RX ADMIN — POLYETHYLENE GLYCOL 3350 17 G: 17 POWDER, FOR SOLUTION ORAL at 09:39

## 2017-01-01 RX ADMIN — MIRTAZAPINE 15 MG: 15 TABLET, FILM COATED ORAL at 21:14

## 2017-01-01 RX ADMIN — AZITHROMYCIN MONOHYDRATE 500 MG: 500 INJECTION, POWDER, LYOPHILIZED, FOR SOLUTION INTRAVENOUS at 01:30

## 2017-01-01 RX ADMIN — IPRATROPIUM BROMIDE AND ALBUTEROL SULFATE 1 AMPULE: .5; 3 SOLUTION RESPIRATORY (INHALATION) at 09:20

## 2017-01-01 RX ADMIN — MULTIPLE VITAMINS W/ MINERALS TAB 1 TABLET: TAB at 09:37

## 2017-01-01 RX ADMIN — ATENOLOL 50 MG: 50 TABLET ORAL at 08:44

## 2017-01-01 RX ADMIN — Medication 9 MG: at 22:30

## 2017-01-01 RX ADMIN — IPRATROPIUM BROMIDE AND ALBUTEROL SULFATE 1 AMPULE: .5; 3 SOLUTION RESPIRATORY (INHALATION) at 08:57

## 2017-01-01 RX ADMIN — METHYLPREDNISOLONE SODIUM SUCCINATE 40 MG: 40 INJECTION, POWDER, FOR SOLUTION INTRAMUSCULAR; INTRAVENOUS at 05:53

## 2017-01-01 RX ADMIN — ENOXAPARIN SODIUM 40 MG: 40 INJECTION SUBCUTANEOUS at 08:44

## 2017-01-01 RX ADMIN — ASPIRIN 81 MG: 81 TABLET ORAL at 09:40

## 2017-01-01 RX ADMIN — BUSPIRONE HYDROCHLORIDE 10 MG: 10 TABLET ORAL at 08:39

## 2017-01-01 RX ADMIN — Medication 1 UNITS: at 11:57

## 2017-01-01 RX ADMIN — IPRATROPIUM BROMIDE AND ALBUTEROL SULFATE 1 AMPULE: .5; 3 SOLUTION RESPIRATORY (INHALATION) at 07:32

## 2017-01-01 RX ADMIN — Medication 10 ML: at 09:32

## 2017-01-01 RX ADMIN — CEFDINIR 300 MG: 300 CAPSULE ORAL at 20:27

## 2017-01-01 RX ADMIN — DOCUSATE SODIUM 100 MG: 100 CAPSULE ORAL at 21:23

## 2017-01-01 RX ADMIN — AZITHROMYCIN MONOHYDRATE 500 MG: 500 INJECTION, POWDER, LYOPHILIZED, FOR SOLUTION INTRAVENOUS at 22:27

## 2017-01-01 RX ADMIN — IPRATROPIUM BROMIDE AND ALBUTEROL SULFATE 1 AMPULE: .5; 3 SOLUTION RESPIRATORY (INHALATION) at 22:30

## 2017-01-01 RX ADMIN — Medication 1 UNITS: at 18:00

## 2017-01-01 ASSESSMENT — ENCOUNTER SYMPTOMS
ABDOMINAL PAIN: 0
SHORTNESS OF BREATH: 0
COUGH: 0
EYE PAIN: 0
PHOTOPHOBIA: 0
ABDOMINAL PAIN: 0
BACK PAIN: 1
ABDOMINAL PAIN: 0
ABDOMINAL DISTENTION: 0
COLOR CHANGE: 1
EYE DISCHARGE: 0
CHOKING: 0
RHINORRHEA: 0
VOMITING: 0
WHEEZING: 0
COLOR CHANGE: 1
EYE REDNESS: 0
TROUBLE SWALLOWING: 0
FACIAL SWELLING: 0
SHORTNESS OF BREATH: 0
VOMITING: 0
SHORTNESS OF BREATH: 0
SHORTNESS OF BREATH: 0
BLOOD IN STOOL: 0
BLOOD IN STOOL: 0
WHEEZING: 0
VOICE CHANGE: 0
COLOR CHANGE: 0
NAUSEA: 0
BACK PAIN: 0
EYE PAIN: 0
COUGH: 1
SHORTNESS OF BREATH: 0
SINUS PRESSURE: 0
ABDOMINAL DISTENTION: 0
DIARRHEA: 0
SORE THROAT: 0
BACK PAIN: 0
CONSTIPATION: 0
DIARRHEA: 0
NAUSEA: 0
CONSTIPATION: 0
DIARRHEA: 0
ABDOMINAL PAIN: 0
SORE THROAT: 0
CHEST TIGHTNESS: 0
NAUSEA: 0
TROUBLE SWALLOWING: 0
DIARRHEA: 0
VOMITING: 0
NAUSEA: 0
FACIAL SWELLING: 0
EYE ITCHING: 0
BACK PAIN: 1
CHEST TIGHTNESS: 0
DIARRHEA: 0
CHEST TIGHTNESS: 0
NAUSEA: 0
VOMITING: 0
EYE REDNESS: 0
BACK PAIN: 1
CONSTIPATION: 0
ABDOMINAL PAIN: 0

## 2017-01-01 ASSESSMENT — PAIN SCALES - GENERAL
PAINLEVEL_OUTOF10: 0
PAINLEVEL_OUTOF10: 7
PAINLEVEL_OUTOF10: 0
PAINLEVEL_OUTOF10: 4
PAINLEVEL_OUTOF10: 0
PAINLEVEL_OUTOF10: 3
PAINLEVEL_OUTOF10: 0
PAINLEVEL_OUTOF10: 2
PAINLEVEL_OUTOF10: 0
PAINLEVEL_OUTOF10: 7
PAINLEVEL_OUTOF10: 0
PAINLEVEL_OUTOF10: 3
PAINLEVEL_OUTOF10: 0
PAINLEVEL_OUTOF10: 4
PAINLEVEL_OUTOF10: 0
PAINLEVEL_OUTOF10: 8
PAINLEVEL_OUTOF10: 5
PAINLEVEL_OUTOF10: 2
PAINLEVEL_OUTOF10: 0
PAINLEVEL_OUTOF10: 7
PAINLEVEL_OUTOF10: 1
PAINLEVEL_OUTOF10: 0
PAINLEVEL_OUTOF10: 2
PAINLEVEL_OUTOF10: 0
PAINLEVEL_OUTOF10: 7
PAINLEVEL_OUTOF10: 7
PAINLEVEL_OUTOF10: 0
PAINLEVEL_OUTOF10: 0
PAINLEVEL_OUTOF10: 3
PAINLEVEL_OUTOF10: 5
PAINLEVEL_OUTOF10: 3
PAINLEVEL_OUTOF10: 0
PAINLEVEL_OUTOF10: 2
PAINLEVEL_OUTOF10: 0
PAINLEVEL_OUTOF10: 3
PAINLEVEL_OUTOF10: 0
PAINLEVEL_OUTOF10: 3
PAINLEVEL_OUTOF10: 5
PAINLEVEL_OUTOF10: 0
PAINLEVEL_OUTOF10: 3
PAINLEVEL_OUTOF10: 8
PAINLEVEL_OUTOF10: 0
PAINLEVEL_OUTOF10: 1
PAINLEVEL_OUTOF10: 0
PAINLEVEL_OUTOF10: 3
PAINLEVEL_OUTOF10: 1
PAINLEVEL_OUTOF10: 0
PAINLEVEL_OUTOF10: 0
PAINLEVEL_OUTOF10: 1
PAINLEVEL_OUTOF10: 0

## 2017-01-01 ASSESSMENT — PAIN DESCRIPTION - PAIN TYPE
TYPE: ACUTE PAIN
TYPE: OTHER (COMMENT)
TYPE: OTHER (COMMENT)
TYPE: ACUTE PAIN
TYPE: OTHER (COMMENT)
TYPE: ACUTE PAIN
TYPE: OTHER (COMMENT)
TYPE: ACUTE PAIN

## 2017-01-01 ASSESSMENT — PAIN SCALES - WONG BAKER
WONGBAKER_NUMERICALRESPONSE: 4
WONGBAKER_NUMERICALRESPONSE: 0
WONGBAKER_NUMERICALRESPONSE: 2
WONGBAKER_NUMERICALRESPONSE: 0

## 2017-01-01 ASSESSMENT — PAIN DESCRIPTION - LOCATION
LOCATION: KNEE
LOCATION: GENERALIZED
LOCATION: BACK
LOCATION: HEAD;ARM
LOCATION: BACK
LOCATION: GENERALIZED
LOCATION: BACK
LOCATION: BACK

## 2017-01-01 ASSESSMENT — PAIN DESCRIPTION - ORIENTATION: ORIENTATION: POSTERIOR;MID;RIGHT

## 2017-01-06 PROBLEM — R29.6 FREQUENT FALLS: Status: ACTIVE | Noted: 2017-01-06

## 2017-01-06 PROBLEM — J18.9 PNEUMONIA DUE TO INFECTIOUS ORGANISM: Status: ACTIVE | Noted: 2017-01-06

## 2017-01-06 PROBLEM — R53.81 PHYSICAL DECONDITIONING: Status: ACTIVE | Noted: 2017-01-06

## 2017-01-07 PROBLEM — A41.9 SEPSIS (HCC): Status: ACTIVE | Noted: 2017-01-07

## 2017-01-08 PROBLEM — J90 PLEURAL EFFUSION, LEFT: Status: ACTIVE | Noted: 2017-01-08

## 2017-02-23 DIAGNOSIS — N28.89 RENAL MASS, RIGHT: Primary | ICD-10-CM

## 2017-04-17 ENCOUNTER — TELEPHONE (OUTPATIENT)
Dept: UROLOGY | Age: 82
End: 2017-04-17

## 2017-09-16 PROBLEM — S40.021A TRAUMATIC HEMATOMA OF RIGHT UPPER ARM: Status: ACTIVE | Noted: 2017-01-01

## 2017-09-16 PROBLEM — S01.01XA LACERATION OF SCALP: Status: ACTIVE | Noted: 2017-01-01

## 2017-09-16 PROBLEM — D69.59 PLATELET DYSFUNCTION DUE TO DRUGS: Status: ACTIVE | Noted: 2017-01-01

## 2017-09-16 PROBLEM — T50.905A PLATELET DYSFUNCTION DUE TO DRUGS: Status: ACTIVE | Noted: 2017-01-01

## 2017-09-25 NOTE — ED PROVIDER NOTES
environmental allergies. Neurological: Negative for dizziness, syncope, weakness and headaches. Hematological: Does not bruise/bleed easily. Psychiatric/Behavioral: Negative for confusion and dysphoric mood. The patient is not nervous/anxious. All other systems reviewed and are negative. PAST MEDICAL HISTORY    has a past medical history of Atrial fibrillation ([address] CAD (coronary artery disease); Cancer ([address] Chronic kidney disease; Chronic pain; Diabetes mellitus ([address] [place] (hard of hearing); Hypertension; Macular degeneration; MDRO (multiple drug resistant organisms) resistance; Medtronic loop recorder; and Unspecified cerebral artery occlusion with cerebral infarction. SURGICAL HISTORY      has a past surgical history that includes TURP (1990); hernia repair (1942, 1950); eye surgery; skin biopsy (1979); and Skin cancer excision (2010). CURRENT MEDICATIONS       Previous Medications    ACETAMINOPHEN (TYLENOL) 325 MG TABLET    Take 650 mg by mouth every 6 hours as needed for Pain    ACETAMINOPHEN 500 MG CAPS    Take 1 tablet by mouth nightly    ALBUTEROL SULFATE HFA (PROAIR HFA) 108 (90 BASE) MCG/ACT INHALER    Inhale 2 puffs into the lungs every 6 hours as needed for Wheezing or Shortness of Breath    ASPIRIN 81 MG EC TABLET    Take 1 tablet by mouth daily. BUSPIRONE (BUSPAR) 10 MG TABLET    Take 10 mg by mouth 2 times daily    DOCUSATE (COLACE, DULCOLAX) 100 MG CAPS    Take 100 mg by mouth 2 times daily    FINASTERIDE (PROSCAR) 5 MG TABLET    Take 5 mg by mouth daily. LIDOCAINE (LIDODERM) 5 %    Place 1 patch onto the skin daily 12 hours on, 12 hours off.     LUBIPROSTONE (AMITIZA) 24 MCG CAPSULE    Take 1 capsule by mouth 2 times daily (with meals)    MAGNESIUM HYDROXIDE (MILK OF MAGNESIA) 400 MG/5ML SUSPENSION    Take 30 mLs by mouth daily as needed for Constipation    MELATONIN 3 MG TABS TABLET    Take 9 mg by mouth nightly    MIDODRINE (PROAMATINE) 5 MG TABLET    Take 1 tablet by contain minor errors which are inherent in voice recognition technology. **      Final report electronically signed by Dr. Paola Jackson on 9/25/2017 3:25 PM          [x] Visualized and interpreted by me   [x] Radiologist's Wet Read Report Reviewed   [] Discussed with Radiologist.    Herb Gilmore:   Results for orders placed or performed during the hospital encounter of 09/25/17   CBC auto differential   Result Value Ref Range    WBC 6.6 4.8 - 10.8 thou/mm3    RBC 3.59 (L) 4.70 - 6.10 mill/mm3    Hemoglobin 12.3 (L) 14.0 - 18.0 gm/dl    Hematocrit 35.8 (L) 42.0 - 52.0 %    MCV 99.6 (H) 80.0 - 94.0 fL    MCH 34.2 (H) 27.0 - 31.0 pg    MCHC 34.4 33.0 - 37.0 gm/dl    RDW 13.9 11.5 - 14.5 %    Platelets 555 703 - 717 thou/mm3    MPV 7.5 7.4 - 10.4 mcm    RBC Morphology NORMAL     Seg Neutrophils 70.7 %    Lymphocytes 14.0 %    Monocytes 12.4 %    Eosinophils 1.8 %    Basophils 1.1 %    nRBC 0 /100 wbc    Macrocytes 1+     Segs Absolute 4.7 1.8 - 7.7 thou/mm3    Lymphocytes # 0.9 (L) 1.0 - 4.8 thou/mm3    Monocytes # 0.8 0.4 - 1.3 thou/mm3    Eosinophils # 0.1 0.0 - 0.4 thou/mm3    Basophils # 0.1 0.0 - 0.1 thou/mm3   Basic Metabolic Panel   Result Value Ref Range    Sodium 135 135 - 145 meq/L    Potassium 4.0 3.5 - 5.2 meq/L    Chloride 94 (L) 98 - 111 meq/L    CO2 29 23 - 33 meq/L    Glucose 104 70 - 108 mg/dL    BUN 16 7 - 22 mg/dL    CREATININE 0.9 0.4 - 1.2 mg/dL    Calcium 8.5 8.5 - 10.5 mg/dL   Protime-INR   Result Value Ref Range    INR 0.99 0.85 - 1.13   Anion Gap   Result Value Ref Range    Anion Gap 12.0 8.0 - 16.0 meq/L   Glomerular Filtration Rate, Estimated   Result Value Ref Range    Est, Glom Filt Rate 78 (A) ml/min/1.73m2   Osmolality   Result Value Ref Range    Osmolality Calc 271.6 (L) 275.0 - 300 mOsmol/kg       EMERGENCY DEPARTMENT COURSE:   Vitals:    Vitals:    09/25/17 1435 09/25/17 1641   BP: (!) 170/114 (!) 168/98   Pulse: 83 85   Resp: 17 17   Temp: 97.9 °F (36.6 °C) 97.8 °F (36.6 °C)   TempSrc: Oral

## 2017-09-25 NOTE — ED TRIAGE NOTES
Pt to room 1 by liane from [facility] with c/o wet cough that is productive since Friday. Pt is currently on a liquid diet and staff at [address] [place] [address] fear that he may have aspirated. Pt has thick yellow sputum. Pt has rhonchi throughout all fields on auscultation. Afebrile. Alert and oriented. Respirations unlabored.

## 2017-09-26 NOTE — PLAN OF CARE
Problem: Impaired respiratory status  Goal: Clear lung sounds  Outcome: Ongoing  Pt has diminished breath sounds in the bases. txs to help improve lung aeration.

## 2017-09-26 NOTE — ED NOTES
Pt reassessed. Resting on cot, respirations even and unlabored. Lung sounds diminished bilaterally, some rhonchi heard. Updated on POC, waiting for inpatient bed, no complaints. SR up x2, call light in reach, telemetry continued, bed alarm on, will continue to monitor.      Delila Skiff, RN  09/25/17 2001

## 2017-09-26 NOTE — PROGRESS NOTES
[facility]  SPEECH THERAPY  [facility] ONC MED 5K  Bedside Swallowing Evaluation      SLP Individual Minutes  Time In: 1380  Time Out: 1152  Minutes: 11          Date: 2017  Patient Name: Ezequiel Valdivia      CSN: [id number]   : 1920  (80 y.o.)  Gender: male   Referring Physician:  Cristel Crockett MD  Diagnosis: Pneumonia   History of Present Illness/Injury: Patient admitted to [facility] with above dx. See physician H&P for full report. ST consulted d/t concerns for aspiration pneumonia. ST to complete swallow evaluation, r/o aspiration and determine least restrictive diet and POC. MBS completed 17 documenting no penetration or aspiration, recommendations dental soft with thin.    Past Medical History:   Diagnosis Date    Atrial fibrillation ([address] 2015    CAD (coronary artery disease)     Cancer (HCC)     skin- removed    Chronic kidney disease     Chronic pain     legs    Diabetes mellitus ([address]     [place] (hard of hearing)     Hypertension     Macular degeneration     MDRO (multiple drug resistant organisms) resistance     Medtronic loop recorder 2013    Unspecified cerebral artery occlusion with cerebral infarction     TIA x2       Pain:  0/10    Current Diet: Regular with thin     Respiratory Status: [x] Independent     Behavioral Observation: [x] Alert [] Oriented [x] Confused-mild [] Lethargic      [] Dysarthric [] Limited Direction Following [] Agitated      [] Other:    ORAL MECHANISM EVALUATION:         Comments:  Facial / Labial [x]WFL [] Impaired []DNT    Lingual [x]WFL [] Impaired []DNT    Dentition [x]WFL [] Impaired []DNT    Velum [x]WFL [] Impaired []DNT    Vocal Quality [x]WFL [] Impaired []DNT    Sensation [x]WFL [] Impaired []DNT    Cough []WFL [x] Impaired []DNT Weak    Other: []WFL [] Impaired []DNT    Other: []WFL [] Impaired []DNT        PATIENT WAS EVALUATED USING:  Thin via straw, puree, hard solids     ORAL PHASE: [x] [place]      PHARYNGEAL PHASE: [x] WFL: Pharyngeal phase appears WFLs, but cannot completely rule out pharyngeal phase deficits from a bedside swallow evaluation alone. SIGNS AND SYMPTOMS OF LARYNGEAL PENETRATION / ASPIRATION:  [x] NO sign/symptoms of aspiration evident with this evaluation, but cannot rule out silent aspiration. IMPRESSIONS: Patient presents with swallowing function that is essentially [facility] given patient's stated age. All lingual/labial/pharyngeal structures present to be intact and functioning approprietly. Patient with baseline cough prior to PO intake. Patient completed PO trials of thin, puree, hard solids without s/s of aspiration. Patient demonstrated with timely, effective mastication, bolus formation, timely swallow trigger, adequate laryngeal elevation, effective oral clearance, no s/s of aspiration with all consistencies, vocal quality dry and clear. MBS completed 01/12/17 documenting no penetration or aspiration recommending dental soft with thin. Recommend continuation of regular diet with thin liquids at this time. MARCELLUS Mares reports good success with current diet. No further ST services warranted d/t patient functioning at baseline. Speech Therapy to sign off at this time. Certainly re-consult speech therapy if change in swallowing function or significant change in pulmonary function. RECOMMENDATIONS:     Modified Barium Swallow: [] Is indicated to further assess    [x] Is NOT indicated at this time; Will recommend as        appropriate. DIET RECOMMENDATIONS:  Regular with thin     STRATEGIES: [] Strategies pending MBS results.   [x] Full upright position  [x] Small bite/sip [] No Straw [] Multiple Swallow  [] Chin tuck [] Head turn [x] Pulmonary monitoring [] Oral care after all meals  [] Supervision  [] Medication in applesauce []Direct 1:1 Supervision  [] Spoon all liquids [] Alternate solid / liquid [] Limit distractions [] Monitor for fatigue  [] PMV in place for all po []

## 2017-09-26 NOTE — PROGRESS NOTES
BNP in the last 72 hours. Lipids: No results for input(s): CHOL, HDL in the last 72 hours. Invalid input(s): LDLCALCU  INR:   Recent Labs      09/25/17   1521   INR  0.99       Radiology    Objective:   Vitals: BP (!) 148/70  Pulse 74  Temp 97.6 °F (36.4 °C) (Oral)   Resp 18  Ht 5' 10\" (1.778 m)  Wt 176 lb 12.8 oz (80.2 kg)  SpO2 94%  BMI 25.37 kg/m2  HEENT: Head:pupils react  Neck: supple  Lungs: decreased both base  Heart: regular rate and rhythm   Abdomen: soft BS heard   Extremities: warm  noedema  Neurologic:  Alert, oriented X3    Impression:   :   1.  Pneumonia with fever. 2.  History of orthostatic hypotension and supine hypertension.   3. history of diabetes  4. history of chronic kidney disease  5. history of skin cancer  6. history of paroxysmal atrial fibrillation.     Plan:    Cont antibiotics bronchodilators  Await final cult  If continued improvement discharge soon      Sanjana Funk MD

## 2017-09-26 NOTE — H&P
fever, but no chest pain and no difficulty breathing. No palpitations. No diarrhea. Pain in his ribs is improved. PHYSICAL EXAMINATION:  VITAL SIGNS:  Blood pressure was 183/79, pulse 79, respirations 16,  temperature 97.8. HEENT:  Pupils are reacting to light. Tongue is moist.  Buccal mucosa  is moist.  NECK:  Supple. HEART:  S1 and S2 heard with a regular rhythm, not tachycardic. LUNGS:  Air entry is diminished bilaterally with no rales or rhonchi. ABDOMEN:  Soft. Bowel sounds heard. No guarding. No tenderness. EXTREMITIES:  Trace edema. NEUROLOGIC:  He is awake and oriented and following instructions. LABORATORY DATA:  WBC of 6.6, hemoglobin 12.3, hematocrit 35.8,  platelets of 602. Sodium of 135, potassium of 4, chloride of 94, CO2  was 29, BUN of 16, creatinine of 0.9, glucose was 104, calcium of 8.5,  serum osmolality was 271. DIAGNOSTIC DATA:  Chest x-ray showed focal infiltrate in the left  lower lobe. IMPRESSION:  1. Pneumonia with fever. 2.  History of orthostatic hypotension and supine hypertension. Other diagnoses include history of diabetes, history of chronic kidney  disease, history of skin cancer, history of paroxysmal atrial  fibrillation. RECOMMENDATIONS:  1. Continue with broad-spectrum antibiotics. I will also give one  dose of vancomycin as the patient was in the hospital recently. 2.  Bronchodilators. 3.  Resume other home medications as appropriate. 4.  Monitor blood pressure. We will keep pressure little bit on the  higher range to avoid orthostatic hypotension. 5.  GI and DVT prophylaxis. 6.  The patient's code status is limited code.         Jayleen Steve M.D.    D: 09/25/2017 20:02:24       T: 09/25/2017 23:37:04     [email or web address]  Job#: [id number]     Doc#: [id number]

## 2017-09-26 NOTE — FLOWSHEET NOTE
During my contact with the patient, family members were present. I offered emotional support, nurtured hope, provided words of encouragement and a prayer of healing/comfort to the pt./family. I also placed a spiritual care card in the room. Spiritual plan: It would be helpful if Spiritual Care would continue to follow up on this case.       09/26/17 1535   Encounter Summary   Services provided to: Patient   Referral/Consult From: Rounding   Continue Visiting Yes  (9/26/17 )   Complexity of Encounter Low   Length of Encounter 15 minutes   Routine   Type Initial   Assessment Approachable   Intervention Prayer

## 2017-09-26 NOTE — ED NOTES
In to reassess pt. Pt resting on cot in semi fowlers position, respirations easy and unlabored. Linens changes for comfort measures. Will monitor.      Estephanie Ng RN  09/25/17 2126

## 2017-09-26 NOTE — PLAN OF CARE
Problem: Falls - Risk of  Goal: Absence of falls  Outcome: Ongoing  Continue to monitor    Problem: Risk for Impaired Skin Integrity  Goal: Tissue integrity - skin and mucous membranes  Structural intactness and normal physiological function of skin and  mucous membranes. Outcome: Ongoing  Turn every 2 hours    Problem: Discharge Planning:  Goal: Discharged to appropriate level of care  Discharged to appropriate level of care   Outcome: Ongoing  Plan discharge to [facility]    Comments:   Care plan reviewed with patient . Patient  verbalize understanding of the plan of care and contribute to goal setting.

## 2017-09-27 NOTE — PROGRESS NOTES
IM Progress Note  Dr. Christina José  9/27/2017 9:34 AM      Patient name Nori Liao  [id number]  PCP: Jacob Morales MD  Admit Date: 9/25/2017  Acct No. [de-identified]    Subjective: Interval History:   Requires max assist for transfer  Breathing stable     Diet: DIET GENERAL;    I/O last 3 completed shifts: In: 220 [P.O.:200; I.V.:20]  Out: 1770 [Urine:1770]  I/O this shift:  In: 225 [P.O.:225]  Out: 170 [Urine:170]        Admission weight: 160 lb (72.6 kg) as of 9/25/2017  2:27 PM  Wt Readings from Last 3 Encounters:   09/25/17 176 lb 12.8 oz (80.2 kg)   09/19/17 166 lb 8 oz (75.5 kg)   05/08/17 170 lb (77.1 kg)     Body mass index is 25.37 kg/(m^2).     ROS   CVS;  no cp or palpitation  Resp: has cough  Neuro:  No numbness or weakness or dizziness  Abd: no nausea or vomiting or abd pain      Medications:   Scheduled Meds:   sodium chloride flush  10 mL Intravenous 2 times per day    aspirin  81 mg Oral Daily    busPIRone  10 mg Oral BID    docusate sodium  100 mg Oral BID    finasteride  5 mg Oral Daily    lidocaine  1 patch Transdermal Daily    lubiprostone  24 mcg Oral BID WC    melatonin  9 mg Oral Nightly    midodrine  5 mg Oral TID WC    mirtazapine  15 mg Oral Nightly    therapeutic multivitamin-minerals  1 tablet Oral Daily    polyethylene glycol  17 g Oral Daily    potassium chloride  10 mEq Oral Daily    cefTRIAXone (ROCEPHIN) IV  1 g Intravenous Q24H    azithromycin  500 mg Intravenous Q24H    ipratropium-albuterol  1 ampule Inhalation 4x daily    enoxaparin  40 mg Subcutaneous Q24H     Continuous Infusions:     Labs :     CBC:   Recent Labs      09/25/17   1521  09/26/17   0603   WBC  6.6  5.6   HGB  12.3*  12.2*   PLT  230  219     BMP:    Recent Labs      09/25/17   1521  09/26/17   0603   NA  135  137   K  4.0  3.9   CL  94*  100   CO2  29  28   BUN  16  12   CREATININE  0.9  0.8   GLUCOSE  104  97     Hepatic: No results for input(s): AST, ALT, ALB, BILITOT, ALKPHOS in the last

## 2017-09-27 NOTE — PLAN OF CARE
Problem: Impaired respiratory status  Goal: Clear lung sounds  Outcome: Ongoing  Continue with aerosols to aid in bronchodilation.

## 2017-09-27 NOTE — PROGRESS NOTES
Got report from primary nurse [facility] Electronically signed by Sharon Mcdonnell on 9/27/2017 at 12:58 PM

## 2017-09-27 NOTE — PLAN OF CARE
Problem: Impaired respiratory status  Goal: Clear lung sounds  Outcome: Ongoing  Breath sounds clear and diminished, continue aerosols to decrease shortness of breath

## 2017-09-27 NOTE — PROGRESS NOTES
Patient was taught about the importance of getting repositioned and that was to get of pressure from his back by helping him lay on his side a while Electronically signed by Yuliana Clemons on 9/27/2017 at 1:03 PM

## 2017-09-27 NOTE — PROGRESS NOTES
[facility]  INPATIENT PHYSICAL THERAPY  EVALUATION  [facility] ONC MED 5K    Time In: 1833  Time Out: 1341  Timed Code Treatment Minutes: 8 Minutes  Minutes: 23          Date: 2017  Patient Name: Violette Calvin,  Gender:  male        MRN: [id number]  : 1920  (80 y.o.)  Referral Date : 17   Referring Practitioner: Dee Galarza MD  Diagnosis: pneumonia  Additional Pertinent Hx:  Violette Calvin is a 80 y.o. male with a past medical history of CAD, A-Fib, cancer, and chronic pain. He presents to the emergency department for evaluation of a fever and a cough. The patient is hard of hearing and does have an aid with him at bedside. He reports that he has been coughing up phlegm as well. He does have some back pain due to a fall that occurred two weeks ago. He has not developed any cuts or rashes from the fall but does appear to have multiple bruises. He does states that he has had hematochezia but this is not uncommon for him. The patient is on a blood thinner as well and is allergic to Penicillin. He denies any chest pain, shortness of breath, abdominal pain, urinary problems, headache, dizziness, lightheadedness, loss of consciousness, or nausea.       Past Medical History:   Diagnosis Date    Atrial fibrillation ([address] 2015    CAD (coronary artery disease)     Cancer (HCC)     skin- removed    Chronic kidney disease     Chronic pain     legs    Diabetes mellitus ([address]     [place] (hard of hearing)     Hypertension     Macular degeneration     MDRO (multiple drug resistant organisms) resistance     Medtronic loop recorder 2013    Unspecified cerebral artery occlusion with cerebral infarction     TIA x2     Past Surgical History:   Procedure Laterality Date    EYE SURGERY      laser detached retina    HERNIA REPAIR  1942, 1950    x2    [facility] removed from back   [address]      [address] [facility] per week: 5x GM  Specific instructions for Next Treatment: strengthening, ambulation, standing balance, endurance, gait training  Current Treatment Recommendations: Strengthening, Balance Training, Functional Mobility Training, Transfer Training, Endurance Training, Gait Training, Stair training, Home Exercise Program, Safety Education & Training, Equipment Evaluation, Education, & procurement, Patient/Caregiver Education & Training    Goals:  Patient goals : return to [facility]. Short term goals  Time Frame for Short term goals: 1 week  Short term goal 1: patient to perform bed mobility at S to get in and out of bed  Short term goal 2: patient to perform transfers from various surfaces at S to rise from bed or chair  Short term goal 3: patient to ambulate 35ft with Rw at SBA to walk to and from restroom  Long term goals  Time Frame for Long term goals : defer d/t short ELOS    Evaluation Complexity: Based on the findings of patient history, examination, clinical presentation, and decision making during this evaluation, the evaluation of Kendal Trinh  is of medium complexity.

## 2017-09-27 NOTE — PLAN OF CARE
Problem: Risk for Impaired Skin Integrity  Goal: Tissue integrity - skin and mucous membranes  Structural intactness and normal physiological function of skin and  mucous membranes. Intervention: SKIN ASSESSMENT  Has numerous bruises, scab on left leg , heels are red elevated on pillows. Red coccyx repositioned on side  Intervention: PRESSURE ULCER PREVENTION  Heels elevated on pillows       Intervention: TURN PATIENT  Turned side to side      Problem: Discharge Planning  Intervention: Discharge to appropriate level of care  Plan to go to [address] [place] [address]      Comments:   Care plan reviewed with . Patient and  verbalize understanding of the plan of care and contribute to goal setting.

## 2017-09-28 NOTE — PROGRESS NOTES
reported he had to have a BM. Completed toilet t/f with min A & vcs for technique, dependent for toileting. Therapist educated Pt on UE placement with t/fs & walker safety/posture with walker. Assessment:  Assessment: Pt required min A for ambulating & min-mod A for t/fs. Pt reports indep at AL with mobility. Continued OT recommended to increase indep & safety with ADLs & functional mobility for safe return to AL. Performance deficits / Impairments: Decreased functional mobility , Decreased ADL status, Decreased endurance, Decreased safe awareness, Decreased balance  Prognosis: Fair  Discharge Recommendations: Continue to assess pending progress, Subacute/Skilled Nursing Facility    Clinical Decision Making: Clinical Decision making was of Moderate Complexity as the result of analysis of data from a detailed assessment, a consideration of several treatment options, the presence of comorbidities affecting the plan of care and the need for minimal to moderate modifications or assistance required to complete the evaluation. Patient Education:  Patient Education: OT role, POC, safety with mobility-UE placement with t/fs, walker safety  Barriers to Learning: decreased cognition    Equipment Recommendations:  Equipment Needed: No    Safety:  Safety Devices in place: Yes  Type of devices:  All fall risk precautions in place, Gait belt, Call light within reach, Chair alarm in place, Nurse notified    Plan:  Times per week: 3-5x  Current Treatment Recommendations: Balance Training, Functional Mobility Training, Endurance Training, Self-Care / ADL    Goals:  Patient goals : Pt did not state    Short term goals  Time Frame for Short term goals: 1 week  Short term goal 1: Complete various t/fs including toilet with CGA & min vcs for UE placement  Short term goal 2: Complete 2-3 min standing ADL with CGA for increased ease of sinkside grooming  Short term goal 3: Complete mobility to/from bathroom with RW & CGA   Short

## 2017-09-28 NOTE — PROGRESS NOTES
Consult received. Chart reviewed. Spoke with patient about going to TCU for further therapy prior to returning back to assisted living. Plan TCU tomorrow. Joyce Wills RN and Jr Pacheco CM made aware.

## 2017-09-28 NOTE — PROGRESS NOTES
Physical Therapy   [address] ONC MED 5K    Time In: 1130  Time Out: 1153  Timed Code Treatment Minutes: 23 Minutes  Minutes: 23          Date: 2017  Patient Name: Susan Lehman,  Gender:  male        MRN: [id number]  : 1920  (80 y.o.)  Referral Date : 17  Referring Practitioner: Dane Shanks MD  Diagnosis: pneumonia  Additional Pertinent Hx:  Susan Lehman is a 80 y.o. male with a past medical history of CAD, A-Fib, cancer, and chronic pain. He presents to the emergency department for evaluation of a fever and a cough. The patient is hard of hearing and does have an aid with him at bedside. He reports that he has been coughing up phlegm as well. He does have some back pain due to a fall that occurred two weeks ago. He has not developed any cuts or rashes from the fall but does appear to have multiple bruises. He does states that he has had hematochezia but this is not uncommon for him. The patient is on a blood thinner as well and is allergic to Penicillin. He denies any chest pain, shortness of breath, abdominal pain, urinary problems, headache, dizziness, lightheadedness, loss of consciousness, or nausea.       Past Medical History:   Diagnosis Date    Atrial fibrillation ([address] 2015    CAD (coronary artery disease)     Cancer (HCC)     skin- removed    Chronic kidney disease     Chronic pain     legs    Diabetes mellitus ([address]     [place] (hard of hearing)     Hypertension     Macular degeneration     MDRO (multiple drug resistant organisms) resistance     Medtronic loop recorder 2013    Unspecified cerebral artery occlusion with cerebral infarction     TIA x2     Past Surgical History:   Procedure Laterality Date    EYE SURGERY      laser detached retina    HERNIA REPAIR  1942, 1950    x2    [facility] removed from back   [address]      [address] [facility] Restrictions/Precautions:  Restrictions/Precautions: General Precautions, Fall Risk    Subjective:  Chart Reviewed: Yes  Response To Previous Treatment: Patient with no complaints from previous session. Family / Caregiver Present: No  Subjective: RN approved session. Pt resting in bedside chair upon arrival and agreeable to therapy. Very [place]. Pain:  Denies. Social/Functional:  Lives With:  (Andie AL)  Type of Home: Facility  Home Layout: One level  Home Equipment: Rolling walker     Objective:  Sit to Supine: Minimal assistance (Pt initially throws himself backwards without turning shoulders toward pillows. Caught pt to avoid hitting head on opposite bedrail. Pt then requiring min to mod a to transfer to supine position on second attempt. )    Transfers  Sit to Stand: Moderate Assistance (From bedside chair. Cuing for pt to scoot out to edge of chair and for \"noes over toes\" technique. )  Stand to sit: Contact guard assistance       Ambulation 1  Surface: level tile  Device: Rolling Walker  Assistance: Minimal assistance  Quality of Gait: Forward flexed posture. Cuing to stay closer to AD for decreased fall risk. Min A to manuever AD. Min instability with no LOB  Distance: 15 feet in room. Balance  Comments: Dynamic sit at EOB to complete there ex. Pt requiring contact guard and verbal cuing to correct posterior lean    Exercises:  Exercises  Comments: Seated at EOB pt completed BLE ankle pumps, LAQ, marches x 10 reps each to increase strength for improved functional mobility. Activity Tolerance:  Activity Tolerance: Patient Tolerated treatment well    Assessment: Body structures, Functions, Activity limitations: Decreased functional mobility , Decreased ADL status, Decreased safe awareness, Decreased endurance, Decreased balance  Assessment: Pt tolerated session fairly well. Limited by decreased strength, decreased balance, decreased endurance.  Pt would benefit from further therapy before discharge  Prognosis: Good  REQUIRES PT FOLLOW UP: Yes  Discharge Recommendations: Continue to assess pending progress, Patient would benefit from continued therapy after discharge    Patient Education:  Patient Education: Reviewed transfer training. Reviewed gait technique. Equipment Recommendations:  Equipment Needed: No  Other: continue to assess    Safety:  Type of devices: All fall risk precautions in place, Call light within reach, Patient at risk for falls, Nurse notified, Bed alarm in place, Gait belt, Left in bed    Plan:  Times per week: 5x GM  Specific instructions for Next Treatment: strengthening, ambulation, standing balance, endurance, gait training  Current Treatment Recommendations: Strengthening, Balance Training, Functional Mobility Training, Transfer Training, Endurance Training, Gait Training, Stair training, Home Exercise Program, Safety Education & Training, Equipment Evaluation, Education, & procurement, Patient/Caregiver Education & Training    Goals:  Patient goals : return to [place].     Short term goals  Time Frame for Short term goals: 1 week  Short term goal 1: patient to perform bed mobility at S to get in and out of bed  Short term goal 2: patient to perform transfers from various surfaces at S to rise from bed or chair  Short term goal 3: patient to ambulate 35ft with Rw at SBA to walk to and from restroom    Long term goals  Time Frame for Long term goals : defer d/t short ELOS

## 2017-09-28 NOTE — PROGRESS NOTES
IM Progress Note  Dr. Jaleel Navarro  9/28/2017 1:37 PM      Patient name Kendal Trinh  [id number]  PCP: Cindy Early MD  Admit Date: 9/25/2017  Acct No. [de-identified]    Subjective: Interval History:   Feels better    Diet: DIET GENERAL;    I/O last 3 completed shifts: In: 705 [P.O.:705]  Out: 1320 [[id number]]  I/O this shift:  In: 250 [P.O.:240; I.V.:10]  Out: 300 [Urine:300]        Admission weight: 160 lb (72.6 kg) as of 9/25/2017  2:27 PM  Wt Readings from Last 3 Encounters:   09/25/17 176 lb 12.8 oz (80.2 kg)   09/19/17 166 lb 8 oz (75.5 kg)   05/08/17 170 lb (77.1 kg)     Body mass index is 25.37 kg/(m^2). ROS   CVS;  no cp or palpitation  Resp: has cough  Neuro:  No numbness or weakness or dizziness  Abd: no nausea or vomiting or abd pain      Medications:   Scheduled Meds:   sodium chloride flush  10 mL Intravenous 2 times per day    aspirin  81 mg Oral Daily    busPIRone  10 mg Oral BID    docusate sodium  100 mg Oral BID    finasteride  5 mg Oral Daily    lidocaine  1 patch Transdermal Daily    melatonin  9 mg Oral Nightly    midodrine  5 mg Oral TID WC    mirtazapine  15 mg Oral Nightly    therapeutic multivitamin-minerals  1 tablet Oral Daily    polyethylene glycol  17 g Oral Daily    potassium chloride  10 mEq Oral Daily    cefTRIAXone (ROCEPHIN) IV  1 g Intravenous Q24H    azithromycin  500 mg Intravenous Q24H    ipratropium-albuterol  1 ampule Inhalation 4x daily    enoxaparin  40 mg Subcutaneous Q24H     Continuous Infusions:     Labs :     CBC:   Recent Labs      09/25/17   1521  09/26/17   0603   WBC  6.6  5.6   HGB  12.3*  12.2*   PLT  230  219     BMP:    Recent Labs      09/25/17   1521  09/26/17   0603   NA  135  137   K  4.0  3.9   CL  94*  100   CO2  29  28   BUN  16  12   CREATININE  0.9  0.8   GLUCOSE  104  97     Hepatic: No results for input(s): AST, ALT, ALB, BILITOT, ALKPHOS in the last 72 hours.   Troponin: No results for input(s): TROPONINI in the last 72 hours.  BNP: No results for input(s): BNP in the last 72 hours. Lipids: No results for input(s): CHOL, HDL in the last 72 hours. Invalid input(s): LDLCALCU  INR:   Recent Labs      09/25/17   1521   INR  0.99       Radiology    Objective:   Vitals: /72  Pulse 74  Temp 97.8 °F (36.6 °C) (Axillary)   Resp 20  Ht 5' 10\" (1.778 m)  Wt 176 lb 12.8 oz (80.2 kg)  SpO2 96%  BMI 25.37 kg/m2  HEENT: Head:pupils react  Neck: supple  Lungs: decreased both base but no rales or rhonchi  Heart: regular rate and rhythm   Abdomen: soft BS heard   Extremities: warm  No edema  Neurologic:  Awake    Impression:   :   1.  Pneumonia . 2. History of orthostatic hypotension and supine hypertension.   3. history of diabetes  4. history of chronic kidney disease  5. history of skin cancer  6. history of paroxysmal atrial fibrillation.     Plan:    Informed plans are for TCU   Cont bronchodilators and atibiotics    Jesi Jose MD

## 2017-09-28 NOTE — PLAN OF CARE
Problem: Falls - Risk of  Goal: Absence of falls  Outcome: Ongoing  Fall assessment completed. Patient using call light appropriately to call for assistance with ambulation to bathroom. Personal items within reach. Patient is also compliant with use of non-skid slippers. Problem: Risk for Impaired Skin Integrity  Goal: Tissue integrity - skin and mucous membranes  Structural intactness and normal physiological function of skin and  mucous membranes. Outcome: Ongoing  No skin breakdown this shift. Patient being assisted with turning. Patients states understanding of repositioning every two hours. Problem: Discharge Planning:  Goal: Discharged to appropriate level of care  Discharged to appropriate level of care   Outcome: Ongoing  Discharge plan is [facility]. Goal: Participates in care planning  Participates in care planning   Outcome: Met This Shift    Problem: Airway Clearance - Ineffective:  Goal: Clear lung sounds  Clear lung sounds   Outcome: Met This Shift  Goal: Ability to maintain a clear airway will improve  Ability to maintain a clear airway will improve   Outcome: Met This Shift    Problem: Pain:  Goal: Pain level will decrease  Pain level will decrease   Outcome: Met This Shift  Goal: Control of acute pain  Control of acute pain   Outcome: Met This Shift  Goal: Control of chronic pain  Control of chronic pain   Outcome: Met This Shift    Comments:   Care plan reviewed with patient. Patient verbalize understanding of the plan of care and contribute to goal setting.

## 2017-09-28 NOTE — PLAN OF CARE
Problem: Impaired respiratory status  Goal: Clear lung sounds  Outcome: Ongoing  Improve breath sounds and decrease WOB.

## 2017-09-28 NOTE — PROGRESS NOTES
Pt alert and oriented to person. Disoriented to place and time. Speech appropriate and clear. No concerns voiced. Skin pale, warm, dry. No numbness or tingling present in upper or lower extremities. Hand grasp strong and equal bilaterally. Heart sounds regular S1, S2. Lung sounds clear, diminished in bases. Chest expansion symmetrical bilaterally. Cough present. Skin turgor < 3 seconds. Capillary refill > 3 seconds. Abdomen soft, round, no tenderness present. Active bowel sounds x4. Bruising on right back. Skin lesion on left shin. Pedal push and pull weak bilaterally. Resting quietly in bed. Call light in reach. Jade Rodriguez CHANDU-.

## 2017-09-28 NOTE — PROGRESS NOTES
Pharmacy Medication History Note      List of current medications patient is taking is complete. Source of information: [facility] [place]    Changes made to medication list:  Medications removed (include reason, ex. therapy complete or physician discontinued):  · Macrobid - therapy completed (ordered for 7 days from 9/13/17)    Medications added/doses adjusted:  · None    Other notes (ex. Recent course of antibiotics, Coumadin dosing):  · Patient had been on Zoloft 25mg daily but this had been held recently. [facility] was awaiting clarification for Zoloft per [facility]  · Denies use of other OTC or herbal medications.         Electronically signed by Abdulkadir Gleason [facility] on 9/28/2017 at 7:34 AM

## 2017-09-29 NOTE — PLAN OF CARE
Problem: Impaired respiratory status  Goal: Clear lung sounds  Outcome: Ongoing  Pt has clear/diminished breath sounds.  txs to help improve lung aeration

## 2017-09-29 NOTE — PLAN OF CARE
Problem: Falls - Risk of  Intervention: Fall risk assessment  MAINTAINED  Intervention: Postfall assessment  MONITER  Intervention: Fall precautions  MAINTAINED  Intervention: Toileting assistance   CALLS OUT FOR URINAL AND  NURSE ASSIST    Goal: Absence of falls  Outcome: Ongoing  UP IN CHAIR PER THERAPHY UP FOR 1.5 HOUR AND ASSISTED BACK TO BED 2 NURSED  PT VERY UNSTEADY AND LEANS BACK, PUT IN  BED WITH BILAT SDC APPLIED AND BED ALARM ON    Problem: Risk for Impaired Skin Integrity  Goal: Tissue integrity - skin and mucous membranes  Structural intactness and normal physiological function of skin and  mucous membranes. Intervention: SKIN ASSESSMENT  HAS NUMEROUS BRUISES  TURNED SIDE TO SIDE   Intervention: PRESSURE ULCER PREVENTION  WAFFLE CUSHION IN CHAIR  Intervention: TURN PATIENT  TURNED SIDE TO SIDE WHILE IN BED      Problem: Discharge Planning:  Goal: Discharged to appropriate level of care  Discharged to appropriate level of care   Outcome: Ongoing  TCU ON HOLD depending on CHEST XRAY RESULTS,  PT HAVING HARSH COUGH  Goal: Participates in care planning  Participates in care planning   SONS INVOLVED IN CARE  DECISIONS    Problem: Pain:  Goal: Pain level will decrease  Pain level will decrease   Outcome: Ongoing  TAKES NORCO AND  LIDODERM PATCH FOR PAIN        Comments:   Care plan reviewed with patient and SONS. Patient and SONS verbalize understanding of the plan of care and contribute to goal setting.

## 2017-09-29 NOTE — FLOWSHEET NOTE
Up till after lunch coughing  Yelling out saying HELLO, USED URINAL VOIDS 100 YELLOW URINE , ASSISTED BACK TO BED WITH 2 NURSES PULLS BACK WHEN UP

## 2017-09-29 NOTE — PLAN OF CARE
Problem: Nutrition  Goal: Optimal nutrition therapy  Outcome: Ongoing  Nutrition Problem: Inadequate oral intake  Intervention: Food and/or Nutrient Delivery: Continue current diet, Start ONS  Nutritional Goals:  (Pt will eat at least 75% of his meals/nutritional supplements)

## 2017-09-29 NOTE — PROGRESS NOTES
IM Progress Note  Dr. Marmolejo Cap  9/29/2017 1:34 PM      Patient name Kendal Trinh  [id number]  PCP: Cindy Early MD  Admit Date: 9/25/2017  Acct No. [de-identified]    Subjective: Interval History:   Appears more congested  Coughing     Diet: DIET GENERAL;    I/O last 3 completed shifts: In: 840 [P.O.:560; I.V.:280]  Out: 1725 [Urine:1725]  I/O this shift: In: 400 [P.O.:390; I.V.:10]  Out: 200 [Urine:200]        Admission weight: 160 lb (72.6 kg) as of 9/25/2017  2:27 PM  Wt Readings from Last 3 Encounters:   09/25/17 176 lb 12.8 oz (80.2 kg)   09/19/17 166 lb 8 oz (75.5 kg)   05/08/17 170 lb (77.1 kg)     Body mass index is 25.37 kg/(m^2). ROS   CVS;  no cp or palpitation  Resp: has cough  Neuro:  No numbness or weakness or dizziness  Abd: no nausea or vomiting or abd pain      Medications:   Scheduled Meds:   azithromycin  500 mg Oral Q24H    sodium chloride flush  10 mL Intravenous 2 times per day    aspirin  81 mg Oral Daily    busPIRone  10 mg Oral BID    docusate sodium  100 mg Oral BID    finasteride  5 mg Oral Daily    lidocaine  1 patch Transdermal Daily    melatonin  9 mg Oral Nightly    midodrine  5 mg Oral TID WC    mirtazapine  15 mg Oral Nightly    therapeutic multivitamin-minerals  1 tablet Oral Daily    polyethylene glycol  17 g Oral Daily    potassium chloride  10 mEq Oral Daily    cefTRIAXone (ROCEPHIN) IV  1 g Intravenous Q24H    ipratropium-albuterol  1 ampule Inhalation 4x daily    enoxaparin  40 mg Subcutaneous Q24H     Continuous Infusions:     Labs :     CBC:   No results for input(s): WBC, HGB, PLT in the last 72 hours. BMP:    No results for input(s): NA, K, CL, CO2, BUN, CREATININE, GLUCOSE in the last 72 hours. Hepatic: No results for input(s): AST, ALT, ALB, BILITOT, ALKPHOS in the last 72 hours. Troponin: No results for input(s): TROPONINI in the last 72 hours. BNP: No results for input(s): BNP in the last 72 hours.   Lipids: No results for input(s): CHOL,

## 2017-09-29 NOTE — PROGRESS NOTES
[address]  OCCUPATIONAL THERAPY MISSED TREATMENT NOTE  ACUTE CARE    Date: 2017  Patient Name: Wendy Enriquez        CSN: [id number]   : 1920  (80 y.o.)  Gender: male   Referring Practitioner: Dr. Ibanez Area  Diagnosis: pneumonia         REASON FOR MISSED TREATMENT:  Missed Treat. Approached RN and she stated to hold off on seeing patient as she had just got him settled down and he is resting.   Will attempt again as time permits

## 2017-09-29 NOTE — PROGRESS NOTES
Physical Therapy   [address] ONC MED 5K    Time In: 6713  Time Out: 1031  Timed Code Treatment Minutes: 23 Minutes  Minutes: 23        Date: 2017  Patient Name: Scott Olsen,  Gender:  male        MRN: [id number]  : 1920  (80 y.o.)  Referral Date : 17  Referring Practitioner: Winifred Raya MD  Diagnosis: pneumonia  Additional Pertinent Hx:  Scott Olsen is a 80 y.o. male with a past medical history of CAD, A-Fib, cancer, and chronic pain. He presents to the emergency department for evaluation of a fever and a cough. The patient is hard of hearing and does have an aid with him at bedside. He reports that he has been coughing up phlegm as well. He does have some back pain due to a fall that occurred two weeks ago. He has not developed any cuts or rashes from the fall but does appear to have multiple bruises. He does states that he has had hematochezia but this is not uncommon for him. The patient is on a blood thinner as well and is allergic to Penicillin. He denies any chest pain, shortness of breath, abdominal pain, urinary problems, headache, dizziness, lightheadedness, loss of consciousness, or nausea.       Past Medical History:   Diagnosis Date    Atrial fibrillation ([address] 2015    CAD (coronary artery disease)     Cancer (HCC)     skin- removed    Chronic kidney disease     Chronic pain     legs    Diabetes mellitus ([address]     [place] (hard of hearing)     Hypertension     Macular degeneration     MDRO (multiple drug resistant organisms) resistance     Medtronic loop recorder 2013    Unspecified cerebral artery occlusion with cerebral infarction     TIA x2     Past Surgical History:   Procedure Laterality Date    EYE SURGERY      laser detached retina    HERNIA REPAIR  1942, 1950    x2    [facility] [address] [facility]   [address]      [address] [facility] Restrictions/Precautions:  Restrictions/Precautions: General Precautions, Fall Risk    Subjective:  Chart Reviewed: Yes  Response To Previous Treatment: Patient with no complaints from previous session. Family / Caregiver Present: No  Subjective: RN approved session. Pt resting in bed upon arrival, with some encouragement agreeable to get up to chair. Pt has been sleeping through most of day. Oriented to person only. Throughout session pt having difficult time staying awake. Would close eyes and begin to yell out \"hello? hello? \" and would require this PTA to re-orient pt. Pt left up in chair with blinds open and lights/tv on to try and keep pt awake. Pain:  Denies. Social/Functional:  Lives With:  ([facility])  Type of Home: Facility  Home Layout: One level  Home Equipment: Rolling walker     Objective:  Supine to Sit: Moderate assistance (Cuing for technique as pt wanting to pull from hand of therapist. )  Sit to Supine: Unable to assess (Pt left sitting up in bedside chair. )    Transfers  Sit to Stand: Minimal Assistance (From EOB)  Stand to sit: Contact guard assistance (Cuing for pt to reach back for chair and to \"sit slowly\")       Ambulation 1  Surface: level tile  Device: Rolling Walker  Assistance: Minimal assistance  Quality of Gait: Short shuffled steps with minimal B heel strike. Forward flexed posture. Min instability, no LOB  Distance: 3 feet from EOB to bedside chair. Balance  Comments: Static/dynamic sit at EOB while preparing to stand requiring stand by assist to contact guard assist for safety. Static stand at AD requiring minimal assist to contact guard assist to correct posterior lean. Exercises:  Exercises  Comments: Seated in bedside chair pt completed BLE ankle pumps, LAQ, marches all x10 reps to increase strength for improved functional mobility. Activity Tolerance:  Activity Tolerance: Patient Tolerated treatment well    Assessment:   Body structures,

## 2017-09-29 NOTE — PROGRESS NOTES
Moderate    Nutrition Interventions:   Continue current diet, Start ONS  Continued Inpatient Monitoring, Education not appropriate at this time    Nutrition Evaluation:   · Evaluation: Goals set   · Goals:  (Pt will eat at least 75% of his meals/nutritional supplements)    · Monitoring: Meal Intake, Supplement Intake, Skin Integrity, Mental Status/Confusion, Weight, Comparative Standards, Pertinent Labs    See Adult Nutrition Doc Flowsheet for more detail.      Electronically signed by Laurence Martinez RD, ANA on 9/29/17 at 2:21 PM    Contact Number: [phone]

## 2017-09-29 NOTE — PROGRESS NOTES
Pharmacy Intravenous to Oral Protocol  Medication changed per P&T protocol: azithromycin     Patient meets criteria based on the followin. IV therapy > 24 hours - yes  2. Nausea/vomiting - none noted, no prn anti-emetics used in prior 24 horus  3. Regular diet - general   4. Tolerating other oral medications: yes  5. Afebrile for 24 hours: yes  6.  WBC trending downward: WBC 5.6 on 17    Dannie Loyd, PharmD, BCPS, [facility] 2017 11:11 AM

## 2017-09-29 NOTE — FLOWSHEET NOTE
Dr Jenelle Turner in consulted [address] on  Hold till results of chest xray patient cont to yell out [facility] AND [facility] [address]

## 2017-09-30 NOTE — PLAN OF CARE
Problem: Impaired respiratory status  Goal: Clear lung sounds  Outcome: Ongoing  Diminished but clear breath sounds, treatments continued for improved aeration and clear breath sounds

## 2017-09-30 NOTE — PLAN OF CARE
Problem: Falls - Risk of  Goal: Absence of falls  Outcome: Ongoing  Disorientated to place, calls out continuously, bed ain low and locked position , bed alarm on    Problem: Risk for Impaired Skin Integrity  Goal: Tissue integrity - skin and mucous membranes  Structural intactness and normal physiological function of skin and  mucous membranes. Outcome: Ongoing  repositioned every 2 hours, heels elevated    Problem: Discharge Planning:  Goal: Discharged to appropriate level of care  Discharged to appropriate level of care   Outcome: Ongoing  To TCU on discharge    Problem: Airway Clearance - Ineffective:  Goal: Clear lung sounds  Clear lung sounds   Outcome: Ongoing  Spontaneous loose cough, sats at 95% on room air    Problem: Pain:  Goal: Pain level will decrease  Pain level will decrease   Outcome: Ongoing  Denies pain at this time    Comments:   Care plan reviewed with patient. Patient unable to  verbalize understanding of the plan of care and contribute to goal setting.

## 2017-09-30 NOTE — PROGRESS NOTES
IM Progress Note  Covering for Dr. Kwabena Venegas  9/30/2017 9:53 AM      Patient name Eddi Gagnon  [facility]/[birth year]  PCP: John Arrington MD  Admit Date: 9/25/2017  Acct No. [de-identified]    Subjective: Interval History:   Difficulty bringing up phlegm  laboured breathing when seen today     Diet: DIET GENERAL;  Dietary Nutrition Supplements: Diabetic Oral Supplement  Dietary Nutrition Supplements: Frozen Oral Supplement    I/O last 3 completed shifts: In: 750 [P.O.:730; I.V.:20]  Out: 800 [Urine:800]           Admission weight: 160 lb (72.6 kg) as of 9/25/2017  2:27 PM  Wt Readings from Last 3 Encounters:   09/25/17 176 lb 12.8 oz (80.2 kg)   09/19/17 166 lb 8 oz (75.5 kg)   05/08/17 170 lb (77.1 kg)     Body mass index is 25.37 kg/(m^2). ROS   CVS;  no cp or palpitation  Resp: has cough  Neuro:  No numbness or weakness or dizziness  Abd: no nausea or vomiting or abd pain      Medications:   Scheduled Meds:   azithromycin  500 mg Oral Q24H    sodium chloride flush  10 mL Intravenous 2 times per day    aspirin  81 mg Oral Daily    busPIRone  10 mg Oral BID    docusate sodium  100 mg Oral BID    finasteride  5 mg Oral Daily    lidocaine  1 patch Transdermal Daily    melatonin  9 mg Oral Nightly    midodrine  5 mg Oral TID WC    mirtazapine  15 mg Oral Nightly    therapeutic multivitamin-minerals  1 tablet Oral Daily    polyethylene glycol  17 g Oral Daily    potassium chloride  10 mEq Oral Daily    cefTRIAXone (ROCEPHIN) IV  1 g Intravenous Q24H    ipratropium-albuterol  1 ampule Inhalation 4x daily    enoxaparin  40 mg Subcutaneous Q24H     Continuous Infusions:     Labs :     CBC:   Recent Labs      09/30/17   0642   WBC  6.1   HGB  12.6*   PLT  265     BMP:    No results for input(s): NA, K, CL, CO2, BUN, CREATININE, GLUCOSE in the last 72 hours. Hepatic: No results for input(s): AST, ALT, ALB, BILITOT, ALKPHOS in the last 72 hours.   Troponin: No results for input(s): TROPONINI in the last 72 hours.  BNP: No results for input(s): BNP in the last 72 hours. Lipids: No results for input(s): CHOL, HDL in the last 72 hours. Invalid input(s): LDLCALCU  INR:   No results for input(s): INR in the last 72 hours. Radiology    Objective:   Vitals: BP (!) 156/72  Pulse 66  Temp 98.4 °F (36.9 °C) (Oral)   Resp 18  Ht 5' 10\" (1.778 m)  Wt 176 lb 12.8 oz (80.2 kg)  SpO2 95%  BMI 25.37 kg/m2  HEENT: Head:pupils react  Neck: supple  Lungs: wheezes and transmitted sounds  Heart: regular rhythm with normal HS 1 & 2 no gallops  Abdomen: soft non tender , BS heard   Extremities: warm  No edema  Neurologic:  Awake, oriented to person only    Impression:   :   1.  Pneumonia . 2. History of orthostatic hypotension and supine hypertension. 3. history of diabetes  4. history of chronic kidney disease  5. history of skin cancer  6. history of paroxysmal atrial fibrillation.     Plan:    ABGS, Increase bronchodilators to q4h, short course of steroids, acapella  , add incentive spirometry& continue  antibiotics  CXR as noted above,   Plan TCU once a bit more stable       Pradeep Meadows MD

## 2017-09-30 NOTE — PROGRESS NOTES
Report received by primary nurse. Head to toe assessment. Pt repeating continually \"hello\". Pt assissted to use the urinal. Pt content with his son's visit. Voiced no complains.  Call light within reach Electronically signed by Luis MONTELONGO [facility] on 9/30/2017 at 3:14 PM

## 2017-10-01 NOTE — PROGRESS NOTES
IM Progress Note  Covering for Dr. Lorraine Fox  10/1/2017 8:09 AM      Patient name Kerrie Pate  [id number]  PCP: Annie Tony MD  Admit Date: 9/25/2017  Acct No. [de-identified]    Subjective: Interval History:   Breathing better today     Diet: Dietary Nutrition Supplements: Diabetic Oral Supplement  Dietary Nutrition Supplements: Frozen Oral Supplement  DIET DENTAL SOFT; Honey Thick    I/O last 3 completed shifts: In: 360 [P.O.:360]  Out: 420 [Urine:420]           Admission weight: 160 lb (72.6 kg) as of 9/25/2017  2:27 PM  Wt Readings from Last 3 Encounters:   09/25/17 176 lb 12.8 oz (80.2 kg)   09/19/17 166 lb 8 oz (75.5 kg)   05/08/17 170 lb (77.1 kg)     Body mass index is 25.37 kg/(m^2). ROS   CVS;  no cp or palpitation  Resp: has cough  Neuro:  No numbness or weakness or dizziness  Abd: no nausea or vomiting or abd pain      Medications:   Scheduled Meds:   ipratropium-albuterol  1 ampule Inhalation Q4H WA    methylPREDNISolone  40 mg Intravenous Q8H    insulin lispro  0-6 Units Subcutaneous TID WC    insulin lispro  0-3 Units Subcutaneous Nightly    azithromycin  500 mg Oral Q24H    sodium chloride flush  10 mL Intravenous 2 times per day    aspirin  81 mg Oral Daily    busPIRone  10 mg Oral BID    docusate sodium  100 mg Oral BID    finasteride  5 mg Oral Daily    lidocaine  1 patch Transdermal Daily    melatonin  9 mg Oral Nightly    midodrine  5 mg Oral TID WC    mirtazapine  15 mg Oral Nightly    therapeutic multivitamin-minerals  1 tablet Oral Daily    polyethylene glycol  17 g Oral Daily    potassium chloride  10 mEq Oral Daily    cefTRIAXone (ROCEPHIN) IV  1 g Intravenous Q24H    enoxaparin  40 mg Subcutaneous Q24H     Continuous Infusions:   dextrose         Labs :     CBC:   Recent Labs      09/30/17   0642   WBC  6.1   HGB  12.6*   PLT  265     BMP:    No results for input(s): NA, K, CL, CO2, BUN, CREATININE, GLUCOSE in the last 72 hours.   Hepatic: No results for input(s): AST, ALT, ALB, BILITOT, ALKPHOS in the last 72 hours. Troponin: No results for input(s): TROPONINI in the last 72 hours. BNP: No results for input(s): BNP in the last 72 hours. Lipids: No results for input(s): CHOL, HDL in the last 72 hours. Invalid input(s): LDLCALCU  INR:   No results for input(s): INR in the last 72 hours. Results for Nicanor Urrutia (MRN [id number]) as of 10/1/2017 08:10   Ref. Range 9/30/2017 12:18   Source: Unknown L Radial   pH, Blood Gas Latest Ref Range: 7.35 - 7.45  7.45   PCO2 Latest Ref Range: 35 - 45 mmhg 44   pO2 Latest Ref Range: 71 - 104 mmhg 60 (L)   HCO3 Latest Ref Range: 23 - 28 mmol/l 31 (H)   Base Excess (Calculated) Latest Ref Range: -2.5 - 2.5 mmol/l 6.0 (H)   O2 Sat Latest Units: % 92   Reji Test Unknown Positive   DEVICE Unknown Room Air   COLLECTED BY: Unknown [id number]     Radiology    Objective:   Vitals: BP (!) 168/82  Pulse 59  Temp 97.8 °F (36.6 °C) (Oral)   Resp 18  Ht 5' 10\" (1.778 m)  Wt 176 lb 12.8 oz (80.2 kg)  SpO2 92%  BMI 25.37 kg/m2  HEENT: Head:pupils react  Neck: supple  Lungs: improved wheezes   Heart: regular rhythm with normal HS 1 & 2 no gallops  Abdomen: soft non tender , BS heard   Extremities: warm  No edema  Neurologic:  Awake, oriented to person only    Impression:   :   1.  Pneumonia . 2. History of orthostatic hypotension and supine hypertension. 3. history of diabetes  4. history of chronic kidney disease  5. history of skin cancer  6. history of paroxysmal atrial fibrillation.     Breathing better with  steroids,continue bronchodilators q4h,  acapella  ,incentive spirometry& continue  antibiotics  CXR as noted above,   Plan TCU  This week  Dr Laura Burch resumes care in a.m    Yolie Davila MD

## 2017-10-01 NOTE — PLAN OF CARE
Problem: Falls - Risk of  Goal: Absence of falls  Outcome: Met This Shift  Client free of falls. Bed alarm in use. Patient alert, not oriented    Problem: Risk for Impaired Skin Integrity  Goal: Tissue integrity - skin and mucous membranes  Structural intactness and normal physiological function of skin and  mucous membranes. Outcome: Met This Shift  No skin issues noted this shift. Problem: Discharge Planning:  Goal: Discharged to appropriate level of care  Discharged to appropriate level of care   Outcome: Ongoing   assisting with discharge planning. TCU being looked at. Problem: Airway Clearance - Ineffective:  Goal: Clear lung sounds  Clear lung sounds   Outcome: Ongoing  Patient able to cough without difficulty. Breathing treatments continue    Problem: Pain:  Goal: Pain level will decrease  Pain level will decrease   Outcome: Met This Shift  Norco given for rib cage pain from coughing. Problem: Nutrition  Goal: Optimal nutrition therapy  Outcome: Ongoing  Patient a feeder. Nutrition taken without difficulty. Patient on thickened liquids. Comments:   Patient alert, disoriented to time and place, unable to assist with plan of care and goal setting.

## 2017-10-01 NOTE — PLAN OF CARE
Problem: Falls - Risk of  Goal: Absence of falls  Outcome: Ongoing  Patient was reeducated about the call light button to summon assistance. Patient belongings were all kept within reach. Side rails were up 2/4, and the bed was in the lowest position. Patient did not have a fall throughout this shift. Although I reeducated the call light, the patient still yelled out for the nurse about every 2-3 minutes. He is at the most proximal room to the station I am at, so I can respond quickly. Problem: Risk for Impaired Skin Integrity  Goal: Tissue integrity - skin and mucous membranes  Structural intactness and normal physiological function of skin and  mucous membranes. Outcome: Ongoing  The patient was turned throughout the night to promote tissue perfusion. The patient tolerated the turns well. During turning, the patient's back was checked for any signs of skin breakdown. Problem: Airway Clearance - Ineffective:  Goal: Clear lung sounds  Clear lung sounds   Outcome: Ongoing  I ascultated some fine crackles in his upper lobes and his airway tonight. I believe that the diet change to honey thick liquids is helping reduce the excess moisture in his throat. Problem: Pain:  Goal: Pain level will decrease  Pain level will decrease   Outcome: Ongoing  Patient was unable at times to give a 1-10 pain rating, so I assessed his face at times to determine his pain level. His previously stated pain goal is \"No Pain\".

## 2017-10-01 NOTE — PROGRESS NOTES
Reassessment unchanged. Pt. voiced no concerns. Resting with eyes closed. Call light and beside table within reach.  Electronically signed by Gurvinder Santa on 9/30/2017 at 8:06 PM

## 2017-10-01 NOTE — PLAN OF CARE
Problem: Impaired respiratory status  Goal: Clear lung sounds  Outcome: Ongoing  Need to improve breath sounds

## 2017-10-01 NOTE — PROGRESS NOTES
Report and shift hand off to primary nurse.  Electronically signed by Alberto Santa on 9/30/2017 at 8:55 PM

## 2017-10-02 NOTE — PROGRESS NOTES
IM Progress Note  Dr. Nidia Swain  10/2/2017 12:20 PM      Patient name Felix Farmer  [id number]  PCP: Trish Ramirez MD  Admit Date: 9/25/2017  Acct No. [de-identified]    Subjective: Interval History:   Up in chair  Breathing better      Diet: Dietary Nutrition Supplements: Diabetic Oral Supplement  Dietary Nutrition Supplements: Frozen Oral Supplement  DIET DENTAL SOFT; Honey Thick    I/O last 3 completed shifts: In: 820 [P.O.:820]  Out: 300 [Urine:300]           Admission weight: 160 lb (72.6 kg) as of 9/25/2017  2:27 PM  Wt Readings from Last 3 Encounters:   09/25/17 176 lb 12.8 oz (80.2 kg)   09/19/17 166 lb 8 oz (75.5 kg)   05/08/17 170 lb (77.1 kg)     Body mass index is 25.37 kg/(m^2). Medications:   Scheduled Meds:   ipratropium-albuterol  1 ampule Inhalation Q4H WA    methylPREDNISolone  40 mg Intravenous Q8H    insulin lispro  0-6 Units Subcutaneous TID WC    insulin lispro  0-3 Units Subcutaneous Nightly    azithromycin  500 mg Oral Q24H    sodium chloride flush  10 mL Intravenous 2 times per day    aspirin  81 mg Oral Daily    busPIRone  10 mg Oral BID    docusate sodium  100 mg Oral BID    finasteride  5 mg Oral Daily    lidocaine  1 patch Transdermal Daily    melatonin  9 mg Oral Nightly    midodrine  5 mg Oral TID WC    mirtazapine  15 mg Oral Nightly    therapeutic multivitamin-minerals  1 tablet Oral Daily    polyethylene glycol  17 g Oral Daily    potassium chloride  10 mEq Oral Daily    cefTRIAXone (ROCEPHIN) IV  1 g Intravenous Q24H    enoxaparin  40 mg Subcutaneous Q24H     Continuous Infusions:   dextrose         Labs :     CBC:   Recent Labs      09/30/17   0642   WBC  6.1   HGB  12.6*   PLT  265     BMP:    No results for input(s): NA, K, CL, CO2, BUN, CREATININE, GLUCOSE in the last 72 hours. Hepatic: No results for input(s): AST, ALT, ALB, BILITOT, ALKPHOS in the last 72 hours. Troponin: No results for input(s): TROPONINI in the last 72 hours.   BNP: No results for input(s): BNP in the last 72 hours. Lipids: No results for input(s): CHOL, HDL in the last 72 hours. Invalid input(s): LDLCALCU  INR:   No results for input(s): INR in the last 72 hours. Radiology    Objective:   Vitals: BP (!) 146/70  Pulse 96  Temp 97.8 °F (36.6 °C) (Oral)   Resp 18  Ht 5' 10\" (1.778 m)  Wt 176 lb 12.8 oz (80.2 kg)  SpO2 94%  BMI 25.37 kg/m2  HEENT: Head:pupils react  Neck: supple  Lungs: improved air entry bilat  Heart: regular rate and rhythm   Abdomen: soft BS heard   Extremities: warm  No edema  Neurologic:  Awake    Impression:   :   1.  Pneumonia . 2. History of orthostatic hypotension and supine hypertension.   3. history of diabetes  4. history of chronic kidney disease  5. history of skin cancer  6. history of paroxysmal atrial fibrillation.     Plan:    Change to oral  Steroids  Cont antibiotics  Plan discharge to TCU  When bed available    Gerda Thrasher MD

## 2017-10-02 NOTE — PROGRESS NOTES
Restrictions/Precautions:  Restrictions/Precautions: General Precautions, Fall Risk                      Subjective:     Subjective: Pt extremelly pleasant and cooeprative, motivated. At times pt seemed mildly confused but unsure if pt just misinterprets questions/instructions due to [address]. Pt left as found in bs chair with pad alarm on, nurse aware. Pain:   .      denies    Social/Functional:  Lives With:  ([facility])  Type of Home: Facility  Home Layout: One level  Home Equipment: Rolling walker     Objective:       Transfers  Sit to Stand: Moderate Assistance (2 trials from low bs chair )  Stand to sit:  Minimal assist x1 (to low bs chair, cues to back all the way )       Ambulation 1  Surface: level tile  Device: Rolling Walker  Assistance: Minimal assistance  Quality of Gait: when static pt tends to lean heavilly back onto heels, when moving pt better balanced; fatigues quickly, lacks total knee extension   Distance: approx 20 feet x 1           Exercises:  Exercises  Comments: seated LAQ x 30 reps each ; marches, Hip ABD/ADD, AP x 10 reps each all for increased strength and ROM with gait training           Activity Tolerance:  Activity Tolerance: Patient Tolerated treatment well;Patient limited by fatigue;Patient limited by endurance    Assessment: Body structures, Functions, Activity limitations: Decreased functional mobility , Decreased ADL status, Decreased safe awareness, Decreased endurance, Decreased balance  Assessment: greatly improved ambulation distance; pt continues to posteriorly lean when static standing, recommend further therapy to improve mobility   Prognosis: Good  REQUIRES PT FOLLOW UP: Yes  Discharge Recommendations: Continue to assess pending progress, Patient would benefit from continued therapy after discharge    Patient Education:  Patient Education: posture     Equipment Recommendations:  Equipment Needed: No  Other: continue to assess    Safety:  Type of devices:  All fall

## 2017-10-02 NOTE — PLAN OF CARE
Problem: Impaired respiratory status  Goal: Clear lung sounds  Outcome: Not Met This Shift  Scattered rhonchi throughout, pt is somewhat confused, unable to use acapella at this time.

## 2017-10-02 NOTE — PLAN OF CARE
Problem: Impaired respiratory status  Goal: Clear lung sounds  Outcome: Ongoing  Continue therapy to improve breath sounds.

## 2017-10-02 NOTE — CONSULTS
Department of Psychiatry  Consult Service  Physician Assistant Psychiatric Assessment      Thank you very much for allowing us to participate in the care of this patient. Reason for Consult:  Verbally aggressive behavior    HISTORY OF PRESENT ILLNESS:          The patient is a 80 y.o. male with significant history of anxiety, TIA 2013 who is admitted medically from his assisted living facility for pneumonia. He was recently discharged from Psychiatric after being admitted for a fall, orthostatic hypotension. Upon admission, he was slightly confused, but this has increased over the last few days. He has not been combative but has swatted at caregivers. Sleep is decreased. He c/o his family trying to steal his money, notably pointing to people in the room who aren't there. He is confused, unable to answer any questions relevantly, disoriented. He can be heard hollering down the halls \"hello! \"      PSYCHIATRIC HISTORY:      Currently prescribed melatonin, buspar, remeron    Past psychiatric medications includes:     Melatonin  Buspar  Remeron    Adverse reactions from psychotropic medications:    None known      Lifetime Psychiatric Review of Systems  Unable to assess     Prior to Admission medications    Medication Sig Start Date End Date Taking?  Authorizing Provider   lidocaine (LIDODERM) 5 % Place 1 patch onto the skin daily 12 hours on, 12 hours off. 9/19/17  Yes Tc Parisi MD   docusate (COLACE, DULCOLAX) 100 MG CAPS Take 100 mg by mouth 2 times daily 9/19/17  Yes Tc Parisi MD   lubiprostone (AMITIZA) 24 MCG capsule Take 1 capsule by mouth 2 times daily (with meals) 9/19/17  Yes Tc Parisi MD   midodrine (PROAMATINE) 5 MG tablet Take 1 tablet by mouth 3 times daily (with meals) 9/19/17  Yes Tc Parisi MD   melatonin 3 MG TABS tablet Take 9 mg by mouth nightly   Yes Historical Provider, MD   acetaminophen (TYLENOL) 325 MG tablet Take 650 mg by mouth every 6 hours as mL, 10 mL, Intravenous, 2 times per day  sodium chloride flush 0.9 % injection 10 mL, 10 mL, Intravenous, PRN  HYDROcodone-acetaminophen (NORCO) 5-325 MG per tablet 1 tablet, 1 tablet, Oral, Q4H PRN **OR** HYDROcodone-acetaminophen (NORCO) 5-325 MG per tablet 2 tablet, 2 tablet, Oral, Q4H PRN  acetaminophen (TYLENOL) tablet 650 mg, 650 mg, Oral, Q6H PRN  aspirin EC tablet 81 mg, 81 mg, Oral, Daily  busPIRone (BUSPAR) tablet 10 mg, 10 mg, Oral, BID  docusate sodium (COLACE) capsule 100 mg, 100 mg, Oral, BID  finasteride (PROSCAR) tablet 5 mg, 5 mg, Oral, Daily  lidocaine (LIDODERM) 5 % 1 patch, 1 patch, Transdermal, Daily  magnesium hydroxide (MILK OF MAGNESIA) 400 MG/5ML suspension 30 mL, 30 mL, Oral, Daily PRN  melatonin tablet 9 mg, 9 mg, Oral, Nightly  midodrine (PROAMATINE) tablet 5 mg, 5 mg, Oral, TID WC  mirtazapine (REMERON) tablet 15 mg, 15 mg, Oral, Nightly  therapeutic multivitamin-minerals 1 tablet, 1 tablet, Oral, Daily  polyethylene glycol (GLYCOLAX) packet 17 g, 17 g, Oral, Daily  potassium chloride (KLOR-CON) extended release tablet 10 mEq, 10 mEq, Oral, Daily  cefTRIAXone (ROCEPHIN) 1 g IVPB in 50 mL D5W minibag, 1 g, Intravenous, Q24H  enoxaparin (LOVENOX) injection 40 mg, 40 mg, Subcutaneous, Q24H     Past Medical History:        Diagnosis Date    Atrial fibrillation ([address] 7/20/2015    CAD (coronary artery disease)     Cancer [facility] [address]     skin- removed    Chronic kidney disease     Chronic pain     legs    Diabetes mellitus ([address]     [place] (hard of hearing)     Hypertension     Macular degeneration     MDRO (multiple drug resistant organisms) resistance     Medtronic loop recorder 7/25/2013    Unspecified cerebral artery occlusion with cerebral infarction     TIA x2       Past Surgical History:        Procedure Laterality Date    EYE SURGERY      laser detached retina    HERNIA REPAIR  1942, 1950    x2    SKIN BIOPSY  1979    melanoma removed from back   [facility]  2010 complication, without long-term current use of insulin (HCC) E11.9    Hyponatremia E87.1    Chronic atrial fibrillation (HCC) I48.2    Foreign body aspiration T17.900A    Abnormal CT scan, chest R93.8    Cough R05    Abnormal chest x-ray R93.8    Abnormal chest xray R93.8    Aspiration pneumonia (HCC) J69.0    Medtronic reveal loop recorder Z95.818    HCAP (healthcare-associated pneumonia) J18.9    Frequent falls R29.6    Physical deconditioning R53.81    Sepsis (HCC) A41.9    Pleural effusion, left J90    Fall at home [address] [place], Y92.099    Debility R53.81    Oropharyngeal dysphagia R13.12    Blepharitis of left lower eyelid H01.005    Traumatic hematoma of right upper arm S40.021A    Platelet dysfunction due to drugs D69.59    Laceration of scalp S01. 01XA    Syncope and collapse R55       Stressors     Severity of stressors is severe  Source of stressors include: Other psychosocial and environmental stressors    PLAN:      Start Seroquel 12.5mg QID PRN hallucinations/sleep/anxiety with an interval of at least 3 hours in between doses  Hold Remeron, as its strong anticholinergic effects can contribute to confusion  Additional recommendations will follow the clinical course. Thank you very much for allowing us to participate in the care of this patient. Time spent 45 min. Electronically signed by Paola Conner PA-C on 10/2/2017 at 4:05 PM.  History obtained from:  patient, electronic medical record. Clinical findings and recommendations discussed with and approved by Tayler Portillo MD      I assessed this patient and reviewed the case and plan of care [facility]   I have reviewed the above documentation and I agree with the findings and treatment recommendations as written  Patient was seen resting, not agitated and not screaming   Electronically signed by Vitor Estrella on 10/2/2017 at 6:40 PM

## 2017-10-02 NOTE — PROGRESS NOTES
Pt continues to get more verbally aggressive & confused. Dr Phi Martell. Orders for CT head, ua with c&s and psych consult.   Orders placed

## 2017-10-02 NOTE — PROGRESS NOTES
[facility]  OCCUPATIONAL THERAPY MISSED TREATMENT NOTE  ACUTE CARE    Date: 10/2/2017  Patient Name: Connor Saldana        CSN: [id number]   : 1920  (80 y.o.)  Gender: male   Referring Practitioner: Dr. De Santiago Pain  Diagnosis: pneumonia    REASON FOR MISSED TREATMENT:  Patient refused treatment. RN reports pt very upset at this time but is okay to attempt for OT. Pt declined to participate in OT, stating it was not necessary.

## 2017-10-03 NOTE — PROGRESS NOTES
Patient sitting up in chair with eyes closed. Discussed with primary RN, Nate Quijano and no needs noted at this time. Please call if needs arise.

## 2017-10-03 NOTE — PLAN OF CARE
Problem: Falls - Risk of  Intervention: Fall risk assessment  Pt is high fall risk. Intervention: Fall precautions  Chair alarm & bed alarms are placed when appropriate. Intervention: Toileting assistance  Pt up with one assist/gait belt & walker. Gait is unsteady initially but becomes more steady once he gathers his balance    Goal: Absence of falls  Outcome: Ongoing  Pt will remain free from falls this shift. Problem: Discharge Planning:  Intervention: Promote participation in care  Pt agreeable for TCU & then [facility]    Goal: Discharged to appropriate level of care  Discharged to appropriate level of care   Outcome: Ongoing  TCU for rehab & then [facility] when appropriate    Problem: Airway Clearance - Ineffective: Intervention: Airway assessment  Acapella educated and demonstrated by patient. Intervention: Assess cough ability  Pt able to cough & deep breath  Intervention: Assess amount and/or characteristics of sputum  Congested/non productive cough noted    Goal: Clear lung sounds  Clear lung sounds   Outcome: Ongoing  Lungs continue to have rhonchi throughout. Problem: Pain:  Goal: Pain level will decrease  Pain level will decrease   Outcome: Ongoing  Pt denies any pain this shift. Goal: Control of acute pain  Control of acute pain   Pain is 0/10 on faces scale    Problem: Nutrition  Goal: Optimal nutrition therapy  Outcome: Ongoing  Pt is honey thick/mechanically alt dental soft. Dietician on case    Comments:   Care plan reviewed with patient. Patient verbalize understanding of the plan of care and contribute to goal setting.

## 2017-10-03 NOTE — PROGRESS NOTES
OF MAGNESIA) 400 MG/5ML suspension 30 mL, 30 mL, Oral, Daily PRN  melatonin tablet 9 mg, 9 mg, Oral, Nightly  therapeutic multivitamin-minerals 1 tablet, 1 tablet, Oral, Daily  polyethylene glycol (GLYCOLAX) packet 17 g, 17 g, Oral, Daily  potassium chloride (KLOR-CON) extended release tablet 10 mEq, 10 mEq, Oral, Daily  enoxaparin (LOVENOX) injection 40 mg, 40 mg, Subcutaneous, Q24H     Physical  /83  Pulse 65  Temp 97.7 °F (36.5 °C) (Oral)   Resp 18  Ht 5' 10\" (1.778 m)  Wt 140 lb 12.8 oz (63.9 kg)  SpO2 94%  BMI 20.2 kg/m2    Mental Status Examination:    Level of consciousness:  Mild inattention  Appearance: hospital attire, seated in chair  Behavior/Motor: within normal limits   Attitude toward examiner:  cooperative  Speech:  loud  Mood:  euthymic  Affect:  Full range  Thought processes:  ?  Intermittent confusion vs hearing impairment, at times coherent and logical, relevant  Thought content: denies suicidal or homicidal ideations, denies hallucinations or delusions, does not appear to be responding to internal stimuli   Memory: fluctuates  Insight & Judgement: improving  Medication side effects:  denies       ASSESSMENT    delirium     PLAN    Patient appears to be improving with current regimen  No further recommendations   Psychiatry will sign off  Call with questions     Electronically signed by Vahid Pham PA-C on 10/3/2017 at 2:52 PM time spent 21 minutes

## 2017-10-03 NOTE — PROGRESS NOTES
Recommendations: Continue to assess pending progress, Subacute/Skilled Nursing Facility    Patient Education:  Patient Education: Role of OT, transfer training  Barriers to Learning: decreased cognition    Equipment Recommendations:  Equipment Needed: No    Safety:  Safety Devices in place: Yes  Type of devices:  All fall risk precautions in place, Gait belt, Call light within reach, Chair alarm in place, Nurse notified    Plan:  Times per week: 3-5x  Current Treatment Recommendations: Balance Training, Functional Mobility Training, Endurance Training, Self-Care / ADL    Goals:  Patient goals : Pt did not state    Short term goals  Time Frame for Short term goals: 1 week  Short term goal 1: Complete various t/fs including toilet with CGA & min vcs for UE placement  Short term goal 2: Complete 2-3 min standing ADL with CGA for increased ease of sinkside grooming  Short term goal 3: Complete mobility to/from bathroom with RW & CGA   Short term goal 4: Complete BUE gentle AROM exercises with deep breathing  to increase endurance for BADL  Long term goals  Time Frame for Long term goals : No LTG set d/t short ELOS

## 2017-10-03 NOTE — PROGRESS NOTES
Physical Therapy   [address] ONC MED 5K    Time In: 7534  Time Out: 1030  Timed Code Treatment Minutes: 23 Minutes  Minutes: 23          Date: 10/3/2017  Patient Name: Shad Gaspar,  Gender:  male        MRN: [id number]  : 1920  (80 y.o.)  Referral Date : 17  Referring Practitioner: Anthony Fuentes MD  Diagnosis: pneumonia  Additional Pertinent Hx:  Shad Gaspar is a 80 y.o. male with a past medical history of CAD, A-Fib, cancer, and chronic pain. He presents to the emergency department for evaluation of a fever and a cough. The patient is hard of hearing and does have an aid with him at bedside. He reports that he has been coughing up phlegm as well. He does have some back pain due to a fall that occurred two weeks ago. He has not developed any cuts or rashes from the fall but does appear to have multiple bruises. He does states that he has had hematochezia but this is not uncommon for him. The patient is on a blood thinner as well and is allergic to Penicillin. He denies any chest pain, shortness of breath, abdominal pain, urinary problems, headache, dizziness, lightheadedness, loss of consciousness, or nausea.       Past Medical History:   Diagnosis Date    Atrial fibrillation ([address] 2015    CAD (coronary artery disease)     Cancer (HCC)     skin- removed    Chronic kidney disease     Chronic pain     legs    Diabetes mellitus ([address]     [place] (hard of hearing)     Hypertension     Macular degeneration     MDRO (multiple drug resistant organisms) resistance     Medtronic loop recorder 2013    Unspecified cerebral artery occlusion with cerebral infarction     TIA x2     Past Surgical History:   Procedure Laterality Date    EYE SURGERY      laser detached retina    HERNIA REPAIR  1942, 1950    x2    [facility] [facility]   [address] [facility] [address]      [address] [facility]

## 2017-10-03 NOTE — PLAN OF CARE
Problem: Falls - Risk of  Goal: Absence of falls  Outcome: Ongoing  Pt free from falls this shift. Pt currently confused during shift, calls out intermittently and has set bed alarm on 2 times trying to ambulate without assistance from staff. Pt was unsteady with attempt at ambulation with walker earlier--will re-try with miguel steady if possible. Pt's bed alarm is on and functioning at this time. Problem: Risk for Impaired Skin Integrity  Goal: Tissue integrity - skin and mucous membranes  Structural intactness and normal physiological function of skin and  mucous membranes. Outcome: Ongoing  Pt currently has no new skin breakdown this shift. Pt does have redness on heels, bilat, and bruising scattered t/o (very excessive on upper extremities from prior IV's/blood draws). Pt being turned in bed every 2 hrs or PRN to assist with decreasing chance of skin breakdown. Pt can turn self, but does need assistance. Heels floated. Problem: Discharge Planning:  Goal: Discharged to appropriate level of care  Discharged to appropriate level of care   Outcome: Ongoing  Pt's plan after [facility] is to go to TCU once medically stable from pnemonia. After this pt's goal is to return to [facility]  Goal: Participates in care planning  Participates in care planning   Outcome: Ongoing  Pt does participate in planing with discharge through periods of clearness with confusion. Problem: Pain:  Goal: Pain level will decrease  Pain level will decrease   Outcome: Ongoing  Pt has denied any pain throughout entire shift so far. Will continue to monitor. Problem: Nutrition  Goal: Optimal nutrition therapy  Outcome: Ongoing  Pt still has oral supplement to help provide nutrition. Pt encouraged to drink frequently during rounding to help oral cavity remain hydrated.   Pt tolerating honey-thickened liquids well and is still cognizant of need to maintain blood sugar--refused cookie from dinner and juice later in

## 2017-10-03 NOTE — PROGRESS NOTES
IM Progress Note  Dr. Harshal Yu  10/3/2017 10:46 AM      Patient name Connor Saldana  [id number]  PCP: Houston Richmond MD  Admit Date: 9/25/2017  Acct No. [de-identified]    Subjective: Interval History:   Started on seroquel  Pt now in chair  Able to follow instructions  But does holler quite often        Diet: Dietary Nutrition Supplements: Diabetic Oral Supplement  Dietary Nutrition Supplements: Frozen Oral Supplement  DIET DENTAL SOFT; Honey Thick    I/O last 3 completed shifts: In: 80 [P.O.:920; I.V.:10]  Out: 340 [Urine:340]           Admission weight: 160 lb (72.6 kg) as of 9/25/2017  2:27 PM  Wt Readings from Last 3 Encounters:   10/03/17 140 lb 12.8 oz (63.9 kg)   09/19/17 166 lb 8 oz (75.5 kg)   05/08/17 170 lb (77.1 kg)     Body mass index is 20.2 kg/(m^2). Medications:   Scheduled Meds:   predniSONE  40 mg Oral Daily    atenolol  50 mg Oral Daily    cefdinir  300 mg Oral 2 times per day    ipratropium-albuterol  1 ampule Inhalation Q4H WA    insulin lispro  0-6 Units Subcutaneous TID WC    insulin lispro  0-3 Units Subcutaneous Nightly    azithromycin  500 mg Oral Q24H    sodium chloride flush  10 mL Intravenous 2 times per day    aspirin  81 mg Oral Daily    busPIRone  10 mg Oral BID    docusate sodium  100 mg Oral BID    finasteride  5 mg Oral Daily    lidocaine  1 patch Transdermal Daily    melatonin  9 mg Oral Nightly    midodrine  5 mg Oral TID     therapeutic multivitamin-minerals  1 tablet Oral Daily    polyethylene glycol  17 g Oral Daily    potassium chloride  10 mEq Oral Daily    enoxaparin  40 mg Subcutaneous Q24H     Continuous Infusions:   dextrose         Labs :     CBC:   No results for input(s): WBC, HGB, PLT in the last 72 hours. BMP:    No results for input(s): NA, K, CL, CO2, BUN, CREATININE, GLUCOSE in the last 72 hours. Hepatic: No results for input(s): AST, ALT, ALB, BILITOT, ALKPHOS in the last 72 hours.   Troponin: No results for input(s):

## 2017-10-03 NOTE — PROGRESS NOTES
Planning TCU admission Wednesday 10/4 if medically stable. Sofiya Santoro and Dr. Andreas Dewitt updated.

## 2017-10-04 NOTE — PROGRESS NOTES
[facility]  INPATIENT OCCUPATIONAL THERAPY  [facility] ONC MED 5K  DAILY NOTE    Time:  Time In: 4702  Time Out: 1033  Timed Code Treatment Minutes: 15 Minutes  Minutes: 15          Date: 10/4/2017  Patient Name: Jaclyn Basilio,   Gender: male      Room: [facility]/011-A  MRN: [id number]  : 1920  (80 y.o.)  Referring Practitioner: Dr. Kyle Ruano  Diagnosis: pneumonia  Diagnosis: Pneumonia  Additional Pertinent Hx: 80 y.o. male with a past medical history of CAD, A-Fib, cancer, and chronic pain. He presents to the emergency department for evaluation of a fever and a cough. The patient is hard of hearing and does have an aid with him at bedside. He reports that he has been coughing up phlegm as well. He does have some back pain due to a fall that occurred two weeks ago. He has not developed any cuts or rashes from the fall but does appear to have multiple bruises. He does states that he has had hematochezia but this is not uncommon for him. The patient is on a blood thinner as well and is allergic to Penicillin. He denies any chest pain, shortness of breath, abdominal pain, urinary problems, headache, dizziness, lightheadedness, loss of consciousness, or nausea.       Restrictions/Precautions:  Restrictions/Precautions: General Precautions, Fall Risk                      Past Medical History:   Diagnosis Date    Atrial fibrillation ([address] 2015    CAD (coronary artery disease)     Cancer (HCC)     skin- removed    Chronic kidney disease     Chronic pain     legs    Diabetes mellitus ([address]     [place] (hard of hearing)     Hypertension     Macular degeneration     MDRO (multiple drug resistant organisms) resistance     Medtronic loop recorder 2013    Unspecified cerebral artery occlusion with cerebral infarction     TIA x2     Past Surgical History:   Procedure Laterality Date    EYE SURGERY      laser detached retina    HERNIA REPAIR  1942, 1950    x2    [address] back   [address]  2010    melanoma    TURP  1990           Subjective       Subjective: pt cooperative and confused. RN okayed therapy session. Overall Orientation Status: Impaired  Orientation Level: Oriented X4, pt kept eyes closed during therapy session. Opened only for transfer      Pain:  Pain Assessment  Patient Currently in Pain: Denies       Objective  Overall Cognitive Status: Exceptions  Following Commands: Follows one step commands with increased time; Follows one step commands with repetition  Attention Span: Difficulty attending to directions  Memory: Decreased short term memory;Decreased long term memory  Safety Judgement: Decreased awareness of need for safety;Decreased awareness of need for assistance  Problem Solving: Decreased awareness of errors  Initiation: Requires cues for all  Sequencing: Requires cues for all          Bed mobility  Supine to Sit: Moderate assistance (more assistance today due to pt not wanting to get up out of bed )    Transfers  Sit to stand: Minimal assistance (x1 from EOB )  Stand to sit: Minimal assistance (x1 from EOB to bedside chair with cues for hand placement )       Balance  Sitting Balance: Stand by assistance (sitting EOB )  Standing Balance: Minimal assistance (x1)     Time: 2 min x1 , 1 min x1   Activity: static standing at RW and prep for transfer     Functional Mobility  Functional - Mobility Device: Rolling Walker  Activity: Other  Assist Level: Minimal assistance  Functional Mobility Comments: Completed functional mobility EOB  to recliner chair min A x 1  for balance and safety d/t patients decrease in cognition, min unsteady, upon arrival to chair patient had difficulty bringing walker when backing up to chair, no LOB            Comment: attempted to do AROM but pt declined to partiicpate in          Activity Tolerance:  Activity Tolerance: Treatment limited secondary to decreased cognition    Assessment:     Performance

## 2017-10-04 NOTE — PROGRESS NOTES
2 person skin assessment completed by Ken Avalos LPN and Allen Kc RN. Pictures of 2 areas on the L leg were taken. No pressure related injuries noted.

## 2017-10-04 NOTE — CARE COORDINATION
10/4/17, 1:46 PM    DISCHARGE BARRIERS    Plan is for patient to be discharged to [facility] today. Spoke with Rachid Hartmann regarding discharge plan and to contact son Beau De Leon closer to discharge so that he can coordinate the additional assistance for patient at [facility] [place]. Call to [facility] (Lisa)-left message for her re: discharge to TCU today.
10/4/17, 2:37 PM    Discharge plan discussed by  and . Discharge plan reviewed with patient/ family. Patient/ family verbalize understanding of discharge plan and are in agreement with plan. Understanding was demonstrated using the teach back method.        IMM Letter  IMM Letter given to Patient/Family/Significant other/Guardian/POA/by[de-identified]   IMM Letter date given[de-identified] 10/04/17  IMM Letter time given[de-identified] [phone]     Patient to be discharged to TCU today
9/26/17  11:05 AM  Patient signed initials on medicare form, but not sure he totally understood it. I called son Alphonse Garcia and left message to call me back.
9/27/17, 11:08 AM    DISCHARGE BARRIERS    Received call from son Ed-he confirmed that plan is for patient to return to [facility]. SW advised him pf physician request for staff to assess patient prior to his return to facility to make sure that they can provide the care. He is in agreement with this. HAZEL can contact him or his brother regarding discharge plans.
9/27/17, 3:41 PM    DISCHARGE BARRIERS    Call to [facility] (Lisa)-HAEZL inquired as to whether or not she received SW message from earlier today. She did but, since we had talked yesterday, she wasn't planning on doing so. HAZEL advised her that this was physician request due to patient level of care needs. She will be up tomorrow early afternoon.
9/27/17, 9:40 AM      [facility] day: 2  Location: [facility]11/011-A Reason for admit: Pneumonia [J18.9]  Pneumonia [J18.9]   Treatment Plan of Care: IV Zithromax, IV Rocephin, nebulizers, PT,OT  Core Measures: pneumonia  PCP: Queen Shagufta MD  Discharge Plan: [facility] will come to assess patient, as of now plans to return there
9/29/17, 10:45 AM    Discharge plan discussed by  and . Discharge plan reviewed with patient/ family. Patient/ family verbalize understanding of discharge plan and are in agreement with plan. Understanding was demonstrated using the teach back method.        IMM Letter  IMM Letter given to Patient/Family/Significant other/Guardian/POA/by[de-identified] phone consent to Brigid Baker  IMM Letter date given[de-identified] 09/28/17  IMM Letter time given[de-identified] 1200     Planned to go to TCU today
measures:  Blood cultures prior to atb-yes  Offer vaccines-yes  Haris Grimes instruction sheet given with instructions, patient verbalizes understanding, informed FOREIGN Watters RN and requested he place in care plan  PCP: Maryjo Vazquez MD  Readmission:   Patient has been readmitted within 5 days. Patient went to f/u appointment? yes  If yes, was it within 7 days? yes  Patient was able to fill prescriptions?  yes  Patient is taking medications as prescribed? yes  Cause for readmission? pneumonia  Risk Score:       Discharge Planning  Current Residence:  Assisted living  Living Arrangements:  [place]:  Children, Family Members, /, ECF/Assisted Living  Current Services PTA:     Potential Assistance Needed:  Extended Care Placement  Potential Assistance Purchasing Medications:  No  Does patient want to participate in local refill/ meds to beds program?  No  Type of Home Care Services:  [place] [address], Nursing Services  Patient expects to be discharged to:  [facility]  Expected Discharge date:  09/29/17  Follow Up Appointment: Best Day/ Time: Monday AM    Discharge Plan: patient would like to return to [facility] when discharged     Evaluation: yes

## 2017-10-04 NOTE — PROGRESS NOTES
Patient arrived to the unit ,very tired and sleepy. Hard to keep awake but he does arouse easily. Denies pain at this time. Lying in bed at this time no concerns, assessment completed by [facility]. Admission complete.

## 2017-10-04 NOTE — PROGRESS NOTES
[address] [place] [address]  Transitional Care Unit Preadmission Assessment    Patient Name: Georgia Gilford        MRN: [id number]    Rebeccalyside: [de-identified]    : 1920  (80 y.o.)  Gender: male     [facility] - [facility]:  Date of Admission to the hospital:2017  Date patient eligible for admission:  2017  Primary Diagnosis Pneumonia    Did patient have surgery?   [] Yes- Explain:   [] No    Physicians:  Shalom    Risk for clinical complications/co-morbidities:   Past Medical History:   Diagnosis Date    Atrial [facility] ([facility] 2015    CAD (coronary artery disease)     Cancer (HCC)     skin- removed    Chronic kidney disease     Chronic pain     legs    Diabetes mellitus ([facility]     [place] (hard of hearing)     Hypertension     Macular degeneration     MDRO (multiple drug resistant organisms) resistance     Medtronic loop recorder 2013    Unspecified cerebral artery occlusion with cerebral infarction     TIA x2       Financial Information  Primary insurance:  [x]Medicare [] Medicare HMO  []Commercial insurance    []Medicaid   []Workers Compensation      Secondary Insurance:  [x]Medicare [] Medicare HMO  [x]Commercial insurance    []Medicaid   []Workers Compensation []None    Precautions:   []Cardiac Precautions  []Total hip precautions    []Weight Bearing status:  [x]Safety Precautions/Concerns  []Visually impaired   []Hard of Hearing     Isolation Precautions:         []Yes  [x]No  If Yes:  [] Droplet  []Contact []Airborne                   []VRE          []MRSA               []C-diff         [] TB  [] Other:       Skills:   [x]Physical therapy     [x]Occupational Therapy   []IV Antibiotics   [] IV meds   [] TPN     []PEG tube Feedings      []New Colostomy   [] Ileostomy care/teaching    [] Speech Therapy   []Wound Vac   []Complex Dressing Changes    []Terminal care    []Other       Has patient had Prior Skilled care in the Last 60 days: []Yes  [x]NO   If Yes:   Skilled Facility:    How many skilled days were used?

## 2017-10-04 NOTE — PLAN OF CARE
Problem: Falls - Risk of  Goal: Absence of falls  Outcome: Ongoing  Patient was reeducated about the call light button to summon assistance. Patient belongings were all kept within reach. Side rails were up 2/4, and the bed was in the lowest position. Patient did not have a fall throughout this shift. Assistance x1 was provided moving from the chair to the bed. The bed alarm was on the whole shift. Problem: Risk for Impaired Skin Integrity  Goal: Tissue integrity - skin and mucous membranes  Structural intactness and normal physiological function of skin and  mucous membranes. Outcome: Ongoing  The patient was turned throughout the night to promote tissue perfusion. The patient tolerated the turns well. During turning, the patient's back was checked for any signs of skin breakdown. Transitioning between the chair and bed assisted with tissue perfusion. Problem: Airway Clearance - Ineffective:  Goal: Ability to maintain a clear airway will improve  Ability to maintain a clear airway will improve   Outcome: Ongoing  Patient's ability to swallow food and liquids appear to be gradually improving. Problem: Pain:  Goal: Pain level will decrease  Pain level will decrease   Outcome: Ongoing  Pain level goal was \"No Pain\".   Patient reached this goal.

## 2017-10-04 NOTE — PROGRESS NOTES
IM Progress Note  Dr. Nidia Swain  10/4/2017 1:22 PM      Patient name Felix Farmer  [id number]  PCP: Trish Ramirez MD  Admit Date: 9/25/2017  Acct No. [de-identified]    Subjective: Interval History:   Still hollers but breathing improved        Diet: Dietary Nutrition Supplements: Diabetic Oral Supplement  Dietary Nutrition Supplements: Frozen Oral Supplement  DIET DENTAL SOFT; Honey Thick    I/O last 3 completed shifts: In: 590 [P.O.:580; I.V.:10]  Out: 855 [Urine:855]  I/O this shift:  In: 60 [P.O.:60]  Out: 100 [Urine:100]        Admission weight: 160 lb (72.6 kg) as of 9/25/2017  2:27 PM  Wt Readings from Last 3 Encounters:   10/03/17 140 lb 12.8 oz (63.9 kg)   09/19/17 166 lb 8 oz (75.5 kg)   05/08/17 170 lb (77.1 kg)     Body mass index is 20.2 kg/(m^2). Medications:   Scheduled Meds:   midodrine  2.5 mg Oral TID WC    predniSONE  40 mg Oral Daily    atenolol  50 mg Oral Daily    cefdinir  300 mg Oral 2 times per day    ipratropium-albuterol  1 ampule Inhalation Q4H WA    insulin lispro  0-6 Units Subcutaneous TID WC    insulin lispro  0-3 Units Subcutaneous Nightly    sodium chloride flush  10 mL Intravenous 2 times per day    aspirin  81 mg Oral Daily    busPIRone  10 mg Oral BID    docusate sodium  100 mg Oral BID    finasteride  5 mg Oral Daily    lidocaine  1 patch Transdermal Daily    melatonin  9 mg Oral Nightly    therapeutic multivitamin-minerals  1 tablet Oral Daily    polyethylene glycol  17 g Oral Daily    potassium chloride  10 mEq Oral Daily    enoxaparin  40 mg Subcutaneous Q24H     Continuous Infusions:   dextrose         Labs :     CBC:   No results for input(s): WBC, HGB, PLT in the last 72 hours. BMP:    No results for input(s): NA, K, CL, CO2, BUN, CREATININE, GLUCOSE in the last 72 hours. Hepatic: No results for input(s): AST, ALT, ALB, BILITOT, ALKPHOS in the last 72 hours. Troponin: No results for input(s): TROPONINI in the last 72 hours.   BNP: No

## 2017-10-04 NOTE — PROGRESS NOTES
[address] ONC MED 5K    Time In: 8625  Time Out: 1145  Timed Code Treatment Minutes: 24 Minutes  Minutes: 24          Date: 10/4/2017  Patient Name: Herberth Joyce,  Gender:  male        MRN: [id number]  : 1920  (80 y.o.)  Referral Date : 17  Referring Practitioner: Bassam Latham MD  Diagnosis: pneumonia  Additional Pertinent Hx:  Herberth Joyce is a 80 y.o. male with a past medical history of CAD, A-Fib, cancer, and chronic pain. He presents to the emergency department for evaluation of a fever and a cough. The patient is hard of hearing and does have an aid with him at bedside. He reports that he has been coughing up phlegm as well. He does have some back pain due to a fall that occurred two weeks ago. He has not developed any cuts or rashes from the fall but does appear to have multiple bruises. He does states that he has had hematochezia but this is not uncommon for him. The patient is on a blood thinner as well and is allergic to Penicillin. He denies any chest pain, shortness of breath, abdominal pain, urinary problems, headache, dizziness, lightheadedness, loss of consciousness, or nausea.       Past Medical History:   Diagnosis Date    Atrial fibrillation ([address] 2015    CAD (coronary artery disease)     Cancer (HCC)     skin- removed    Chronic kidney disease     Chronic pain     legs    Diabetes mellitus ([address]     [place] (hard of hearing)     Hypertension     Macular degeneration     MDRO (multiple drug resistant organisms) resistance     Medtronic loop recorder 2013    Unspecified cerebral artery occlusion with cerebral infarction     TIA x2     Past Surgical History:   Procedure Laterality Date    EYE SURGERY      laser detached retina    HERNIA REPAIR  1942, 1950    x2    [facility] removed from back   [address] [facility]      [address] [facility]

## 2017-10-04 NOTE — PROGRESS NOTES
Spoke with Fátima Antunez from [place]. Notified that orders are in for patient to be admitted to unit.

## 2017-10-05 PROBLEM — F05 DELIRIUM DUE TO GENERAL MEDICAL CONDITION: Status: ACTIVE | Noted: 2017-01-01

## 2017-10-05 NOTE — PLAN OF CARE
Problem: Nutrition  Goal: Optimal nutrition therapy  Outcome: Ongoing  Nutrition Problem: Inadequate oral intake  Intervention: Food and/or Nutrient Delivery: Continue current diet, Continue current ONS  Nutritional Goals: Patient will consume 75% or greater of meals and ONS during LOS

## 2017-10-05 NOTE — FLOWSHEET NOTE
Spiritual Care Follow Up:    Pt was eating his lunch but with every bite he was crying out \"Hello\". I helped him with a few bites. No family was in the room. I am trying to locate his AD from medical records. His code status had been limited in the past.  This needs to be reviewed. 10/05/17 1330   Encounter Summary   Services provided to: Patient   Referral/Consult From: Rounding   Continue Visiting Yes  (10/5 pt. May be confused)   Complexity of Encounter High   Length of Encounter 15 minutes   Routine   Type Follow up   Assessment Approachable  (May be confused)   Intervention Nurtured hope   Outcome Coping   Spiritual/Synagogue   Type Spiritual support   Spiritual care card with Psalm 21, prayer or Prayer for peace was given. It has our information of service, hours and phone number on it. Care Plan:  Continue spiritual and emotional care for patient and family. Including prayers.

## 2017-10-05 NOTE — PROGRESS NOTES
neither of whom live near. His son Michael Barrientos is his POA and resides in [facility]. Son Robyn Mooney lives out[place]of[place]state, and coordinates the private duty help patient has been receiving. Patient's sons will need to be contacted closer to time of discharge to include them in care planning. [facility] has stated that they accept him back as long as he is medically stable. Will monitor progress, provide supportive contacts to patient,, and coordinate discharge planning efforts with assisted living facility and family members.           Discharge Planning  Living Arrangements: Alone  Support Systems: Children, Family Members  Potential Assistance Needed: Extended Care Placement  Potential Assistance Purchasing Medications: No  Does patient want to participate in local refill/meds to beds program?: No  Type of Home Care Services: OT, PT, RT, Nursing Services  [facility]  Expected Discharge Date: 10/25/17  Follow Up Appointment: Best Day/Time : Monday AM      Anu Cancino 10/5/2017 1:03 PM

## 2017-10-05 NOTE — PLAN OF CARE
Problem: Impaired respiratory status  Goal: Clear lung sounds  Outcome: Ongoing  Continue treatments to improve breathsounds

## 2017-10-05 NOTE — PROGRESS NOTES
[address] [address]  INPATIENT PHYSICAL THERAPY  EVALUATION  [facility] TCU 8E    Time In: 0730  Time Out: 0809  Timed Code Treatment Minutes: 31 Minutes  Minutes: 39          Date: 10/5/2017  Patient Name: Kendal Trinh,  Gender:  male        MRN: [id number]  : 1920  (80 y.o.)  Referral Date : 10/04/17   Referring Practitioner: Ordering: Gracie Del Rosario. Attending: NATALIE Avendaño MD  Diagnosis: SOB  Additional Pertinent Hx:  Kendal Trinh is a 80 y.o. male with a past medical history of CAD, A-Fib, cancer, and chronic pain. He presents to the emergency department for evaluation of a fever and a cough. The patient is hard of hearing and does have an aid with him at bedside. He reports that he has been coughing up phlegm as well. He does have some back pain due to a fall that occurred two weeks ago. He has not developed any cuts or rashes from the fall but does appear to have multiple bruises. He does states that he has had hematochezia but this is not uncommon for him. The patient is on a blood thinner as well and is allergic to Penicillin. He denies any chest pain, shortness of breath, abdominal pain, urinary problems, headache, dizziness, lightheadedness, loss of consciousness, or nausea.       Past Medical History:   Diagnosis Date    Atrial fibrillation ([address] 2015    CAD (coronary artery disease)     Cancer (HCC)     skin- removed    Chronic kidney disease     Chronic pain     legs    Diabetes mellitus ([address]     [place] (hard of hearing)     Hypertension     Macular degeneration     MDRO (multiple drug resistant organisms) resistance     Medtronic loop recorder 2013    Unspecified cerebral artery occlusion with cerebral infarction     TIA x2     Past Surgical History:   Procedure Laterality Date    EYE SURGERY      laser detached retina    HERNIA REPAIR  1942, 1950    x2    [facility] back   [address] [facility]      [address] [facility] EOB, bedside chair both min A, from straight back chair at end of session mod A secondary to fatigue. verbal cues for hand placement)  Stand to sit: Contact guard assistance (to bedside chair x2 and straight back chair. poor safety awareness first attempt with better performance with cues and fair lowering rest of session)       Ambulation 1  Surface: level tile  Device: Rolling Walker  Assistance: Contact guard assistance, Minimal assistance  Quality of Gait: decreased velocity and satnam, decreased step length B, poor heel strike B, increased distance to RW, forward flexed trunk and flexed knees throughout  Distance: 10ft        TCU Balance:      TCU BALANCE TEST: 0 1 2 8   Balance during to/from sit to stand   x    Balance during walking   x    Balance during turn-around and while walking   x    Balance moving on and off toilet    x   Balance during surface to/from surface transfers    x   0 = Steady at all times  1 = Not steady, but able to stabilize without human assistance  2 = Not steady, only able to stabilize with human assistance  8 = Activity did not occur        ELDERLY MOBILITY SCALE   LYING TO SITTING 1 - Needs help of 1 person   SITTING TO LYING 2 - Independent   SIT TO STAND 1 - Needs help of 1 person (verbal or physical)   STANDING 1 - Stands but requires support   GAIT 0 - Needs physical help to walk or constant supervision   TIMED WALK (6 meters) 1 - Over 30 seconds   FUNCTIONAL REACH 0 - Under 10 cm       TOTAL SCORE 6/20     Scores under 10 - generally these patients are dependent for mobility and require help with basic ADL's.   Scores between 10 and 13 - generally these patients are borderline in terms of safe mobility and ADL independence  Scores over 14 - generally these patients are able to perform mobility independently and safely and are independent in basic ADL's.    *Results are general interpretations that do not take into account cognition, safety awareness and other factors that may Education:  Patient Education: POC, bed mobility, transfers, gait mechanics, performance of exercises, balance, breathing techniques    Equipment Recommendations:  Equipment Needed: No  Other: continue to assess    Safety:  Type of devices: All fall risk precautions in place, Call light within reach, Patient at risk for falls, Nurse notified, Gait belt, Chair alarm in place, Left in chair    Plan:  Times per week: 6x  Specific instructions for Next Treatment: bed mobility, transfers, safety awareness, gait training, functional tolerance to activity, standing balance  Current Treatment Recommendations: Strengthening, Balance Training, Functional Mobility Training, Transfer Training, Endurance Training, Gait Training, Stair training, Home Exercise Program, Safety Education & Training, Equipment Evaluation, Education, & procurement, Patient/Caregiver Education & Training, Wheelchair Mobility Training    Goals:  Patient goals : return to [facility].   Short term goals  Time Frame for Short term goals: 1 week  Short term goal 1: patient to perform supine to sit at min A to get out of bed  Short term goal 2: patient to perform sit to supine at SBA to get in to bed  Short term goal 3: patient to perform transfers from various surfaces at [facility] consistently to rise from bed or chair  Short term goal 4: patient to ambulate 80ft with RW at [facility] for functional mobility at assisted living  Short term goal 5: patient score on elderly mobility scale will improve from 6 to 8 for improved safety and stability and decrease risk of falls  Long term goals  Time Frame for Long term goals : 3 weeks  Long term goal 1: patient to perform supine to sit at SBA to get out of bed  Long term goal 2: patient to perform sit to supine at S to get in to bed  Long term goal 3: patient to perform transfers from various surfaces at SBA consistently to rise from bed or chair  Long term goal 4: patient to ambulate 150ft with RW at A for mobility within assisted living  Long term goal 5: patient score on elderly mobility scale will improve from 6 to 12 for improved safety and stability and decrease risk of falls    Evaluation Complexity: Based on the findings of patient history, examination, clinical presentation, and decision making during this evaluation, the evaluation of Scott Olsen  is of high complexity.

## 2017-10-05 NOTE — PROGRESS NOTES
RECREATIONAL THERAPY  MISSED TREATMENT    [x]Transitional Care Unit  []Inpatient Rehabilitation    Date:  10/5/2017            Patient Name: Jesús Deluca           MRN: [id number]  Ashley: [de-identified]          YOB: 1920 (80 y.o.)       Gender: male      Physician:      REASON FOR MISSED TREATMENT:    []Hold treatment per nursing request  []Patient refusal  []Family declined treatment  []Patient at testing and/or off the floor  []Patient unavailable, with PT/OT/nursing, etc.  [x]Other pt was eating his breakfast upon arrival-not happy with what he was eating and requesting water to drink-offered to warm food for him-put sugar in his oatmeal and that made him happier-states he has been at [facility] for a year now and they are nice there-states he has a son that is a [place] [facility] [facility] and a son that he talks to on the phone daily but does not see them much-states he does not get involved in any leisure activities at [facility] and states he has no hobbies anymore-pt is 89 yrs old-he is [employer] and wears glasses-will continue to investigate interests     Emeka Keene, [address]    10/5/2017

## 2017-10-05 NOTE — H&P
TCU History and Physical      Chief Complaint and Reason for TCU Admission:   Need for continued strengthening prior to disposition    History of Present Illness:  Kerrie Pate  is a 80 y.o. male admitted to the transitional care unit on 10/4/2017. He was admitted from the ED to the acute area of [facility] for pneumonia and fever. Following treatment with aerosols and IV antibiotics his condition stabilized to the point where he could be transferred to [address] [place] [address] for continuation of PT and OT prior to determination of discharge disposition.   Past Medical History:      Diagnosis Date    Atrial fibrillation ([facility] 7/20/2015    CAD (coronary artery disease)     Cancer (HCC)     skin- removed    Chronic kidney disease     Chronic pain     legs    Diabetes mellitus ([facility]     [place] (hard of hearing)     Hypertension     Macular degeneration     MDRO (multiple drug resistant organisms) resistance     Medtronic loop recorder 7/25/2013    Unspecified cerebral artery occlusion with cerebral infarction     TIA x2       Primary care provider: Annie Tony MD     Past Surgical History:      Procedure Laterality Date    EYE SURGERY      laser detached retina    HERNIA REPAIR  1942, 1950    x2    SKIN BIOPSY  1979    melanoma removed from back   [facility]  2010    melanoma    TURP  1990       Allergies:    Pcn [penicillins]    Current Medications:    Current Facility-Administered Medications: sodium chloride flush 0.9 % injection 10 mL, 10 mL, Intravenous, 2 times per day  sodium chloride flush 0.9 % injection 10 mL, 10 mL, Intravenous, PRN  acetaminophen (TYLENOL) tablet 650 mg, 650 mg, Oral, Q6H PRN  aspirin EC tablet 81 mg, 81 mg, Oral, Daily  atenolol (TENORMIN) tablet 50 mg, 50 mg, Oral, Daily  busPIRone (BUSPAR) tablet 10 mg, 10 mg, Oral, BID  cefdinir (OMNICEF) capsule 300 mg, 300 mg, Oral, 2 times per day  docusate sodium (COLACE) capsule 100 mg, 100 mg, Oral, BID  enoxaparin (LOVENOX) injection 40 mg, 40 mg, Subcutaneous, Q24H  finasteride (PROSCAR) tablet 5 mg, 5 mg, Oral, Daily  insulin lispro (HUMALOG) injection vial 0-6 Units, 0-6 Units, Subcutaneous, TID WC  insulin lispro (HUMALOG) injection vial 0-3 Units, 0-3 Units, Subcutaneous, Nightly  ipratropium-albuterol (DUONEB) nebulizer solution 1 ampule, 1 ampule, Inhalation, Q4H WA  lidocaine (LIDODERM) 5 % 1 patch, 1 patch, Transdermal, Daily  magnesium hydroxide (MILK OF MAGNESIA) 400 MG/5ML suspension 30 mL, 30 mL, Oral, Daily PRN  melatonin tablet 9 mg, 9 mg, Oral, Nightly  midodrine (PROAMATINE) tablet 2.5 mg, 2.5 mg, Oral, TID WC  polyethylene glycol (GLYCOLAX) packet 17 g, 17 g, Oral, Daily  potassium chloride (KLOR-CON) extended release tablet 10 mEq, 10 mEq, Oral, Daily  [START ON 10/7/2017] ppd (tuberculin skin test) read, , Does not apply, Weekly  QUEtiapine (SEROQUEL) tablet 12.5 mg, 12.5 mg, Oral, 4x Daily PRN  therapeutic multivitamin-minerals 1 tablet, 1 tablet, Oral, Daily  tuberculin injection 5 Units, 5 Units, Intradermal, Weekly     Resident is taking an antipsychotic medication? Yes     If yes, was Gradual Dose Reduction (GDR) attempted? No     No- GDR is clinically contraindicated due to the fact that tapering the medication  would not achieve the desired therapeutic effects and the current dose is  necessary to maintain or improve the resident's function, well-being, safety, and  quality of life. Social History:  Social History     Social History    Marital status:      Spouse name: N/A    Number of children: 2    Years of education: N/A     Occupational History    Not on file.      Social History Main Topics    Smoking status: Former Smoker     Years: 10.00     Types: Cigarettes     Quit date: 1/1/1953    Smokeless tobacco: Never Used    Alcohol use No    Drug use: No    Sexual activity: No     Other Topics Concern    Not on file     Social History Narrative           Family History:       Problem Relation Age of Onset  Arthritis Sister     Heart Disease Sister     COPD Sister     Arthritis Sister        Review of Systems:  CONSTITUTIONAL:  positive for  fatigue  EYES:  negative for  eye discharge  HEENT:  positive for  hearing loss  RESPIRATORY:  positive for  Loose sounding cough  CARDIOVASCULAR:  negative for  chest pain  GASTROINTESTINAL:  negative for nausea, vomiting, diarrhea and constipation  GENITOURINARY:  negative for dysuria  SKIN:  Easy bruising  HEMATOLOGIC/LYMPHATIC:  positive for easy bruising  MUSCULOSKELETAL:  positive for  arthralgias and muscle weakness  NEUROLOGICAL:  negative for headaches  BEHAVIOR/PSYCH:  negative for increased agitation  10 point system review otherwise negative    Physical Exam:  /61  Pulse 60  Temp 97.8 °F (36.6 °C) (Oral)   Resp 22  Ht 5' 10\" (1.778 m)  Wt 151 lb 4.8 oz (68.6 kg)  SpO2 93%  BMI 21.71 kg/m2  Well developed well nourished elderly white male who is somewhat hard of hearing  Skin is somewhat pale and numerous ecchymotic areas on the arms and the lower legs  Membranes moist  Head normocephalic  Neck without mass  Chest symmetrical  Lungs CTA  Heart S1S2 without murmur  Abd soft, non tender, no mass and active bowel sounds  Extremities with signs of arthritis at several joints and trace edema  Neuro weak  Psy cooperative    Diagnostics:  Recent Results (from the past 24 hour(s))   POCT glucose    Collection Time: 10/04/17  7:42 AM   Result Value Ref Range    POC Glucose 113 (H) 70 - 108 mg/dl   POCT glucose    Collection Time: 10/04/17 11:17 AM   Result Value Ref Range    POC Glucose 255 (H) 70 - 108 mg/dl   POCT glucose    Collection Time: 10/04/17  5:49 PM   Result Value Ref Range    POC Glucose 159 (H) 70 - 108 mg/dl   POCT glucose    Collection Time: 10/04/17  8:41 PM   Result Value Ref Range    POC Glucose 156 (H) 70 - 108 mg/dl       Impression:  Active Hospital Problems    Diagnosis Date Noted    Cerebrovascular disease [I67.9] 11/03/2012

## 2017-10-05 NOTE — PROGRESS NOTES
Nutrition Assessment    Type and Reason for Visit: Initial, Consult (TCU assessment)    Nutrition Recommendations: continue current diet. RD continuing ONS TID    Malnutrition Assessment:  · Malnutrition Status: At risk for malnutrition    Nutrition Diagnosis:   · Problem: Inadequate oral intake  · Etiology: related to Cognitive or neurological impairment (dislikes the food)     Signs and symptoms:  as evidenced by Intake 0-25%, Intake 25-50%, Diet history of poor intake    Nutrition Assessment:  · Subjective Assessment: Patient admitted with SOB. Recent acute admission for pneumonia. Spoke to RN who reports that sometimes patient answers questions appropriately and sometimes not. RN reports that patient did not eat well because he did not care for the food. She did give him a drink of honey thick water and he enjoyed it (prepackaged honey thick water). Patient does not have any local family to bring in food preferences. Will plan to continue ONS of Magic Cups TID & Glucerna TID. Note that patient's weight has been flucuatatin/15: 166#, : 140#, today: 151#. Note wound/ostomy signed off (red, slow to pinky area on right buttock). Current labs: POC: 199, A1C: 5.4. Current meds: Colace, Humalog, Glycolax, Multivitamin.     · Current Nutrition Therapies:  · Oral Diet Orders: Dental Soft, Honey Thick   · Oral Diet intake: 1-25%, 26-50%  · Oral Nutrition Supplement (ONS) Orders: Diabetic Oral Supplement, Frozen Oral Supplement (Glucerna TID & Magic Cup TID)  · ONS intake: Unable to assess (not documented)  · Anthropometric Measures:  · Ht: 5' 10\" (177.8 cm)   · Current Body Wt: 151 lb (68.5 kg)  · Admission Body Wt: 151 lb (68.5 kg)  · Usual Body Wt:  (166# (9/15); 140# ( - per EHR)  · Ideal Body Wt: 166 lb (75.3 kg), % Ideal Body 91%  · BMI Classification: BMI 18.5 - 24.9 Normal Weight (21.8)  · Comparative Standards (Estimated Nutrition Needs):  · Estimated Daily Total Kcal: 2630-5587  · Estimated Daily Protein (g): 75-90    Estimated Intake vs Estimated Needs: Intake Less Than Needs    Nutrition Risk Level: Moderate    Nutrition Interventions:   Continue current diet, Continue current ONS  Continued Inpatient Monitoring, Education not appropriate at this time    Nutrition Evaluation:   · Evaluation: Goals set   · Goals: Patient will consume 75% or greater of meals and ONS during LOS    · Monitoring: Meal Intake, Supplement Intake, Diet Tolerance, Mental Status/Confusion, Weight, Comparative Standards, Pertinent Labs, Chewing/Swallowing    See Adult Nutrition Doc Flowsheet for more detail.      Electronically signed by Romero Dominguez RD, LD on 10/5/17 at 12:13 PM    Contact Number: [phone]

## 2017-10-05 NOTE — PLAN OF CARE
Problem: Pain:  Goal: Control of chronic pain  Control of chronic pain   Outcome: Ongoing  Denies pain    Problem: Bleeding:  Goal: Will show no signs and symptoms of excessive bleeding  Will show no signs and symptoms of excessive bleeding   Outcome: Ongoing  No symptoms of excessive bleeding    Problem: Activity:  Goal: Sleeping patterns will improve  Sleeping patterns will improve   Outcome: Ongoing  Did not sleep well    Problem: Anxiety:  Goal: Level of anxiety will decrease  Level of anxiety will decrease   Outcome: Ongoing  Patient calling out.  Administer med patient is resting quietlyi    Problem: Airway Clearance - Ineffective:  Goal: Clear lung sounds  Clear lung sounds   Outcome: Ongoing  Can hear some wheezing  Goal: Ability to maintain a clear airway will improve  Ability to maintain a clear airway will improve   Outcome: Ongoing  Has a clear airway    Problem: Gas Exchange - Impaired:  Goal: Levels of oxygenation will improve  Levels of oxygenation will improve   Outcome: Ongoing  His level of oxygenation has improved    Problem: Serum Glucose Level - Abnormal:  Goal: Ability to maintain appropriate glucose levels will improve  Ability to maintain appropriate glucose levels will improve   Outcome: Ongoing  His blood glucose was 199 this morning    Problem: Cardiovascular  Goal: Hemodynamic stability  Outcome: Ongoing  Hemodynamic is stable    Problem: Nutrition  Goal: Optimal nutrition therapy  Outcome: Ongoing  Nutrition is poor    Problem: Skin Integrity:  Goal: Absence of new skin breakdown  Absence of new skin breakdown   Outcome: Ongoing  Monitoring patient's skin    Problem: Discharge Planning:  Goal: Patients continuum of care needs are met  Patients continuum of care needs are met   Outcome: Ongoing  Patient's continuum of care needs being met    Problem: Falls - Risk of  Goal: Absence of falls  Outcome: Ongoing  No falls this shift    Problem: Risk for Impaired Skin Integrity  Goal: Tissue

## 2017-10-06 NOTE — PROGRESS NOTES
[facility]  INPATIENT PHYSICAL THERAPY  DAILY NOTE  [facility] TCU 8E    Time In: 9441  Time Out: 1218  Timed Code Treatment Minutes: 43 Minutes  Minutes: 43          Date: 10/6/2017  Patient Name: Kendal Trinh,  Gender:  male        MRN: [id number]  : 1920  (80 y.o.)  Referral Date : 10/04/17  Referring Practitioner: Ordering: Gracie Del Rosario. Attending: NATALIE Avendaño MD  Diagnosis: SOB  Additional Pertinent Hx:  Kendal Trinh is a 80 y.o. male with a past medical history of CAD, A-Fib, cancer, and chronic pain. He presents to the emergency department for evaluation of a fever and a cough. The patient is hard of hearing and does have an aid with him at bedside. He reports that he has been coughing up phlegm as well. He does have some back pain due to a fall that occurred two weeks ago. He has not developed any cuts or rashes from the fall but does appear to have multiple bruises. He does states that he has had hematochezia but this is not uncommon for him. The patient is on a blood thinner as well and is allergic to Penicillin. He denies any chest pain, shortness of breath, abdominal pain, urinary problems, headache, dizziness, lightheadedness, loss of consciousness, or nausea.       Past Medical History:   Diagnosis Date    Atrial fibrillation ([address] 2015    CAD (coronary artery disease)     Cancer (HCC)     skin- removed    Chronic kidney disease     Chronic pain     legs    Diabetes mellitus ([address]     [place] (hard of hearing)     Hypertension     Macular degeneration     MDRO (multiple drug resistant organisms) resistance     Medtronic loop recorder 2013    Unspecified cerebral artery occlusion with cerebral infarction     TIA x2     Past Surgical History:   Procedure Laterality Date    EYE SURGERY      laser detached retina    HERNIA REPAIR  1942, 1950    x2    [facility] [address] removed from back   [address]      [address] [place] [facility]"" Restrictions/Precautions:  Restrictions/Precautions: General Precautions, Fall Risk       Subjective:  Response To Previous Treatment: Patient with no complaints from previous session. Family / Caregiver Present: No (nurses tech sitting in room)  Comments:  anticipate any significant progress in therapy very doubtful . (see subjective above)  Subjective: pt. in b/s chair upon therapy arrival, pt. very confused, pt. gets distracted freq. and very easily, pt. would not follow instructions with majority of activity. Pain:   .  Pain Assessment  Pain Level:  (no # given)  Pain Type: Acute pain  Pain Location: Knee       Social/Functional:  Type of Home: Assisted living (Pt unable to identify assisted living facility; per notes pt stays at [facility])  Home Layout: One level  Home Access: Level entry  Home Equipment: Rolling walker     Objective:       Transfers  Sit to Stand: Moderate Assistance (from b/s chair, mat, ETS, posterior lean upon rising)  Stand to sit: Minimal Assistance;Contact guard assistance (cga with occasional min a due to pt. impulsive and not paying any attention to what he is doing)  Toilet transfer with rolling walker, ETS with MIN A  Stand to sit, MOD A sit to stand, DEPENDENT with hygiene following a BM      Ambulation 1  Surface: level tile  Device: Rolling Walker  Assistance: Minimal assistance;Contact guard assistance  Quality of Gait: decreased velocity and satnam,  freq.  path deviation with pt. bumping front wheel of walker into 2 objects during amb.,decreased step length B, poor heel strike B, increased distance to RW, forward flexed trunk and flexed knees throughout  Distance: 10' x 1,70' x 1,200' x 1  Comments: freq. vc for directions            TCU Balance:      U BALANCE TEST: 0 1 2 8   Balance during to/from sit to stand   x    Balance during walking   x    Balance during turn-around and while walking   x    Balance moving on and off toilet   x    Balance during surface to/from surface transfers    x   0 = Steady at all times  1 = Not steady, but able to stabilize without human assistance  2 = Not steady, only able to stabilize with human assistance  8 = Activity did not occur     Exercises:  Exercises  Comments:  attempted  ther. ex seated on mat however pt. would only complete 2 (and sometimes 3 reps each)ankle pumps, long arc quads, and marches with verbal visual and manual cues. attempted ther. ex with pt. reclined in b/s chair however pt. would complain of pain in his knees during completion of quad sets completing only 3 reps and pt. refused glute sets and further ex at this time. all  attempts of ther. ex to increase strength and improved mobility          Activity Tolerance:  Activity Tolerance: Patient limited by cognitive status; Patient limited by endurance; Patient limited by pain    Assessment: Body structures, Functions, Activity limitations: Decreased functional mobility , Decreased ADL status, Decreased safe awareness, Decreased endurance, Decreased balance, Decreased cognition  Assessment: patient tolerated evaluation and session fair. Demos decreased strength of B LEs, decreased balance, and decreased functional tolerance to activity requiring brief seated rest break following ambulation and exercise performance. O2 sats 88% following ambulation and able to return to >90% within 30\" with cues for proper breathing. WIll benefit from skilled PT to improve level of mobility, safety, balance, gait mechanics.    Discharge Recommendations: ECF with PT    Patient Education:  Patient Education: POC, bed mobility, transfers, gait mechanics, performance of exercises, balance, breathing techniques    Equipment Recommendations:  Equipment Needed: No  Other: continue to assess    Safety:  Type of devices: Left in chair, Call light within reach, Chair alarm in place, Gait belt (respiratory therapist arrived for pt's breathing treatment)    Plan:  Times per week: 6x  Specific

## 2017-10-06 NOTE — PROGRESS NOTES
Pt. refuseing to have accu check completed at this time. States\" he dont need it. \" and refusing to use of his finger for blood

## 2017-10-06 NOTE — PLAN OF CARE
Problem: Impaired respiratory status  Goal: Clear lung sounds  Outcome: Ongoing  Treatment will be continued as ordered to improve aeration

## 2017-10-06 NOTE — PLAN OF CARE
Problem: Pain:  Goal: Control of chronic pain  Control of chronic pain   Outcome: Met This Shift  Monitor pain every 2 hours    Problem: Bleeding:  Goal: Will show no signs and symptoms of excessive bleeding  Will show no signs and symptoms of excessive bleeding   Outcome: Met This Shift  Monitor for bleeding every shift    Problem:  Activity:  Goal: Sleeping patterns will improve  Sleeping patterns will improve   Outcome: Met This Shift  Monitor sleep every shift    Problem: Anxiety:  Goal: Level of anxiety will decrease  Level of anxiety will decrease   Outcome: Met This Shift  Monitor for anxiety every shift    Problem: Airway Clearance - Ineffective:  Goal: Clear lung sounds  Clear lung sounds   Outcome: Met This Shift  Monitor lungs every shift  Goal: Ability to maintain a clear airway will improve  Ability to maintain a clear airway will improve   Outcome: Met This Shift  Monitor airway every shift    Problem: Gas Exchange - Impaired:  Goal: Levels of oxygenation will improve  Levels of oxygenation will improve   Outcome: Met This Shift  Monitor oxygenation every shift    Problem: Serum Glucose Level - Abnormal:  Goal: Ability to maintain appropriate glucose levels will improve  Ability to maintain appropriate glucose levels will improve   Outcome: Met This Shift  monitor blood sugars as ordered    Problem: Cardiovascular  Goal: Hemodynamic stability  Outcome: Met This Shift  Monitor vital signs every shift    Problem: Nutrition  Goal: Optimal nutrition therapy  Outcome: Met This Shift  Monitor intake every shift    Problem: Skin Integrity:  Goal: Absence of new skin breakdown  Absence of new skin breakdown   Outcome: Met This Shift  Skin assessment every shift    Problem: Discharge Planning:  Goal: Patients continuum of care needs are met  Patients continuum of care needs are met   Outcome: Met This Shift  Team conference every tuesday    Problem: Falls - Risk of  Goal: Absence of falls  Outcome: Met This Shift  Rounding every 2 hours    Problem: Risk for Impaired Skin Integrity  Goal: Tissue integrity - skin and mucous membranes  Structural intactness and normal physiological function of skin and  mucous membranes.    Outcome: Met This Shift  Skin assessment every shift    Problem: DISCHARGE BARRIERS  Goal: Patients continuum of care needs are met  Outcome: Met This Shift  Team conferance    Comments:   careplan

## 2017-10-06 NOTE — PROGRESS NOTES
Comments: From EOB to bedside chair. No LOB, slight unsteadiness noted. Min vcs required for safety     Type of ROM/Therapeutic Exercise: Free weights  Comment: Pt completed BUE exercises x10 reps x1 set in all joints/planes using 1# dumb bell seated in chair. Limited ROM noted in L shoulder. Pt required rest breaks after each exercise.  Completed exercises to increase strength and activity tolerance required for ADLs and toilet transfers     TCU BALANCE TEST: 0 1 2 8   Balance during to/from sit to stand   x    Balance during walking   x    Balance during turn-around and while walking   x    Balance moving on and off toilet    x   Balance during surface to/from surface transfers    x   0 = Steady at all times  1 = Not steady, but able to stabilize without human assistance  2 = Not steady, only able to stabilize with human assistance  8 = Activity did not occur     Activity Tolerance:  Activity Tolerance: Patient limited by fatigue    Assessment:  Performance deficits / Impairments: Decreased functional mobility , Decreased ADL status, Decreased ROM, Decreased strength, Decreased safe awareness, Decreased cognition, Decreased endurance, Decreased balance  Prognosis: Fair  Discharge Recommendations: Continue to assess pending progress, 24 hour supervision or assist (Return to [facility] with increased level of care)    Patient Education:  Patient Education: safety with transfers and mobility, bed mobility, BUE exercises    Equipment Recommendations:  Equipment Needed: No    Safety:  Safety Devices in place: Yes  Type of devices: Call light within reach, Left in chair, Chair alarm in place, Gait belt    Plan:  Times per week: 6x  Current Treatment Recommendations: Strengthening, ROM, Balance Training, Functional Mobility Training, Endurance Training, Safety Education & Training, Patient/Caregiver Education & Training, Equipment Evaluation, Education, & procurement, Self-Care / ADL    Goals:  Short term goals  Time Frame for Short term goals: 1 week  Short term goal 1: Pt will complete various t/fs including toilet transfer with min A & min vcs for UE placement to increase safety during toileting routine  Short term goal 2: Pt will complete 2-3 min standing ADL with CGA for increased ease of sinkside grooming  Short term goal 3: Pt will complete functional mobility to/from bathroom with RW & CGA to to increase strength for ADLs  Short term goal 4: Pt will complete BUE gentle AROM exercises and min VC's for tech to increase endurance for BADL  Long term goals  Time Frame for Long term goals : 2 weeks  Long term goal 1: Pt will complete toilet transfer with CGA and <2 VC's for safety to increase endurance for toileting routine  Long term goal 2: Pt will complete BUE AROM exercise with yellow tband and <3 rest breaks to increase strength required for self care tasks  Long term goal 3: Pt will complete X4 min standing ADL with CGA to increase independence with ADL routine

## 2017-10-06 NOTE — PROGRESS NOTES
[address] [address]  SPEECH THERAPY  [facility] TCU 8E  Bedside Swallowing Evaluation      SLP Individual Minutes  Time In: 0800  Time Out: 0830  Minutes: 30   Dysphagia eval 15 min  Dysphagia tx 15 min       Date: 10/6/2017  Patient Name: Tashia Ma      CSN: [id number]   : 1920  (80 y.o.)  Gender: male   Referring Physician:  Dr Tayler Lemon  Diagnosis: Pneumonia  Secondary Diagnosis:  Dysphagia  History of Present Illness/Injury: Pt admit to TCU for therapy following hospital admission with pneumonia. Per previous speech notes from [address] [place] [address], Recommendations for Dental soft and thin diet, but chart review shows pt was placed on honey thick liquids at [address] [place] [address] Speech to assess current swallowing function to determine safest diet textures.     Past Medical History:   Diagnosis Date    Atrial fibrillation ([address] 2015    CAD (coronary artery disease)     Cancer (HCC)     skin- removed    Chronic kidney disease     Chronic pain     legs    Diabetes mellitus ([address]     [place] (hard of hearing)     Hypertension     Macular degeneration     MDRO (multiple drug resistant organisms) resistance     Medtronic loop recorder 2013    Unspecified cerebral artery occlusion with cerebral infarction     TIA x2       Pain:  0/10    Current Diet: Dental soft and honey thick liquids    Respiratory Status: [x] Independent     Behavioral Observation: [x] Alert  and confused    ORAL MECHANISM EVALUATION:         Comments:  Facial / Labial [x]WFL [] Impaired []DNT    Lingual [x]WFL [] Impaired []DNT    Dentition [x]WFL [] Impaired []DNT    Velum [x]WFL [] Impaired []DNT    Vocal Quality [x]WFL [] Impaired []DNT    Sensation [x]WFL [] Impaired []DNT    Cough []WFL [] Impaired [x]DNT    Other: []WFL [] Impaired []DNT    Other: []WFL [] Impaired []DNT        PATIENT WAS EVALUATED USING:   Applesauce, honey and nectar liquids, and amol cracker    ORAL PHASE: [] WFL  [x] Impaired   [] Loss of bolus from lips [] Impaired AP movement [] Pocketing Left   [] Pocketing Right  [] Lingual Pumping  [x] Impaired Mastication   [x] Reduced Bolus Formation [] Impaired Oral Initiation    [] OTHER:    PHARYNGEAL PHASE: [] WFL: Pharyngeal phase appears WFLs, but cannot completely rule out pharyngeal phase deficits from a bedside swallow evaluation alone. [x] Impaired   [] Delayed Swallow  [] Decreased Hyolaryngeal Elevation [x] Audible Swallow   [] Suspected Pharyngeal Residue due to spontaneous multiple swallow. [] OTHER:    SIGNS AND SYMPTOMS OF LARYNGEAL PENETRATION / ASPIRATION:  [x] NO sign/symptoms of aspiration evident with this evaluation, but cannot rule out silent aspiration. [] Throat Clear  [] Immediate Cough [] Delayed Cough [] Wet Vocal Quality  [] Change in Pulmonary Status  [] OTHER:    IMPRESSIONS: Pt presents with mid oral and pharnygeal dysphagia with overall decreased endurance affecting swallowing function. No overt aspiration was seen with honey or nectar thick liquids, although silent aspiration cannot be ruled out. Pt did have MBS approx 1 year ago which showed no aspiration at that time. Recommend advancing pt to Dental soft and nectar liquids with close pulmonary monitoring. Further instrumental assessment will be completed if pulmonary status declines and silent aspiration is suspected. Recommend follow up with full speech and language evaluation as well. RECOMMENDATIONS:     Modified Barium Swallow: [] Is indicated to further assess    [x] Is NOT indicated at this time; Will recommend as appropriate. DIET RECOMMENDATIONS:  Dental soft and nectar liquids    STRATEGIES: [] Strategies pending MBS results.   [x] Full upright position  [x] Small bite/sip [] No Straw [] Multiple Swallow  [] Chin tuck [] Head turn [x] Pulmonary monitoring [] Oral care after all meals  [] Supervision  [] Medication in applesauce []Direct 1:1 Supervision  [] Spoon all liquids [] Alternate solid / liquid [] Limit

## 2017-10-06 NOTE — PLAN OF CARE
Problem: Impaired respiratory status  Goal: Clear lung sounds  Outcome: Ongoing  Aerosol treatments to improve breath sounds

## 2017-10-07 NOTE — PROGRESS NOTES
pts son in today concerned with the consistency of his food, he said he cannot have dental soft, he needs mechanical soft dt aspiration. I showed him the recommendation of ST to change diet to dental soft and he again said no. Spoke with Dr. Eusebio Hill and he changed diet to mechanical soft.

## 2017-10-07 NOTE — PROGRESS NOTES
Patient: Jorge A Morrison  Unit/Bed: 8E-56/056-A  YOB: 1920  MRN: [id number] Acct: [de-identified]   Admitting Diagnosis: SOB (shortness of breath) [R06.02]  Admit Date:  10/4/2017  Hospital Day: 3    Assessment:     Active Problems:    Cerebrovascular disease    Essential hypertension    Orthostatic hypotension    Type 2 diabetes mellitus without complication, without long-term current use of insulin (HCC)    Chronic atrial fibrillation (HCC)    Physical deconditioning    Debility    Delirium due to general medical condition      Plan: Will stop the sliding scale coverage as he has not needed it the past several days. Continue therapies        Subjective:     Patient has no complaint of CP, SOB, or GI upset. He states he is feeling stronger. Medication side effects: none    Scheduled Meds:   sodium chloride flush  10 mL Intravenous 2 times per day    aspirin  81 mg Oral Daily    atenolol  50 mg Oral Daily    busPIRone  10 mg Oral BID    cefdinir  300 mg Oral 2 times per day    docusate sodium  100 mg Oral BID    enoxaparin  40 mg Subcutaneous Q24H    finasteride  5 mg Oral Daily    ipratropium-albuterol  1 ampule Inhalation Q4H WA    lidocaine  1 patch Transdermal Daily    melatonin  9 mg Oral Nightly    midodrine  2.5 mg Oral TID WC    polyethylene glycol  17 g Oral Daily    potassium chloride  10 mEq Oral Daily    therapeutic multivitamin-minerals  1 tablet Oral Daily     Continuous Infusions:   PRN Meds:sodium chloride flush, acetaminophen, magnesium hydroxide, QUEtiapine    Review of Systems  Pertinent items are noted in HPI.     Objective:     Patient Vitals for the past 8 hrs:   BP Temp Temp src Pulse Resp SpO2   10/07/17 0847 139/62 96.8 °F (36 °C) Axillary 66 21 95 %     I/O last 3 completed shifts:  In: -   Out: 650 [Urine:650]  I/O this shift:  In: 340 [P.O.:340]  Out: -     /62   Pulse 66   Temp 96.8 °F (36 °C) (Axillary)   Resp 21   Ht 5' 10\" (1.778 m)   Wt 149 lb 9.6 oz (67.9 kg)   SpO2 95%   BMI 21.47 kg/m²     /62   Pulse 66   Temp 96.8 °F (36 °C) (Axillary)   Resp 21   Ht 5' 10\" (1.778 m)   Wt 149 lb 9.6 oz (67.9 kg)   SpO2 95%   BMI 21.47 kg/m²   General appearance: alert, appears stated age and cooperative  Lungs: clear to auscultation bilaterally  Heart: regular rate and rhythm, S1, S2 normal, no murmur, click, rub or gallop  Abdomen: soft, non-tender; bowel sounds normal; no masses,  no organomegaly  Extremities: extremities normal, atraumatic, no cyanosis or edema  Skin: Skin color, texture, turgor normal. No rashes or lesions  Neurologic: weak still    Data Review:   Results for Gisell Varela (MRN [id number]) as of 10/7/2017 10:50   Ref.  Range 10/5/2017 07:46 10/5/2017 08:10 10/6/2017 07:48 10/7/2017 07:26   POC Glucose Latest Ref Range: 70 - 108 mg/dl 199 (H)  135 (H) 106   AVERAGE GLUCOSE Latest Ref Range: 70 - 126 mg/dL  102     Hemoglobin A1C Latest Ref Range: 4.4 - 6.4 %  5.4         Electronically signed by Ivett Torres MD on 10/7/2017 at 10:57 AM

## 2017-10-07 NOTE — PLAN OF CARE
Problem: Impaired respiratory status  Goal: Clear lung sounds  Outcome: Ongoing  Patient is receiving breathing treatments to improve lung sounds.

## 2017-10-08 NOTE — PROGRESS NOTES
[address] [address]  INPATIENT PHYSICAL THERAPY  DAILY NOTE  [facility]    Time In: 9633  Time Out: 1004  Timed Code Treatment Minutes: 30 Minutes  Minutes: 30          Date: 10/8/2017  Patient Name: Felix Farmer,  Gender:  male        MRN: [id number]  : 1920  (80 y.o.)  Referral Date : 10/04/17  Referring Practitioner: Ordering: Jhony Bender. Attending: D. Dagoberto Bosworth, MD  Diagnosis: SOB  Additional Pertinent Hx:  Felix Farmer is a 80 y.o. male with a past medical history of CAD, A-Fib, cancer, and chronic pain. He presents to the emergency department for evaluation of a fever and a cough. The patient is hard of hearing and does have an aid with him at bedside. He reports that he has been coughing up phlegm as well. He does have some back pain due to a fall that occurred two weeks ago. He has not developed any cuts or rashes from the fall but does appear to have multiple bruises. He does states that he has had hematochezia but this is not uncommon for him. The patient is on a blood thinner as well and is allergic to Penicillin. He denies any chest pain, shortness of breath, abdominal pain, urinary problems, headache, dizziness, lightheadedness, loss of consciousness, or nausea.       Past Medical History:   Diagnosis Date    Atrial fibrillation ([address] 2015    CAD (coronary artery disease)     Cancer (HCC)     skin- removed    Chronic kidney disease     Chronic pain     legs    Diabetes mellitus ([address]     [place] (hard of hearing)     Hypertension     Macular degeneration     MDRO (multiple drug resistant organisms) resistance     Medtronic loop recorder 2013    Unspecified cerebral artery occlusion with cerebral infarction     TIA x2     Past Surgical History:   Procedure Laterality Date    EYE SURGERY      laser detached retina    HERNIA REPAIR  1942, 1950    x2    [facility] removed from back   [address]      [address] Restrictions/Precautions:  Restrictions/Precautions: General Precautions, Fall Risk  Subjective:  Response To Previous Treatment: Patient with no complaints from previous session. Comments:  anticipate any significant progress in therapy very doubtful . (see subjective above)  Subjective: pt. in b/s chair upon therapy arrival, pt. very confused, pt. gets distracted freq. and very easily, pt. would not follow instructions with majority of activity. pt. asked ,\"What place is this? \"  pt. was unaware that he was at [facility]    Pain: no pain reported   . Social/Functional:  Type of Home: Assisted living (Pt unable to identify assisted living facility; per notes pt stays at [facility])  Home Layout: One level  Home Access: Level entry  Home Equipment: Rolling walker     Objective:       Transfers  Sit to Stand: Moderate Assistance (freq. vc for hand placement, occasional posterior push upon rising)  Stand to sit: Minimal Assistance (vc for hand placement)    Mod a with sit-> stand and cga stand to sit on ETS, assist needed with hygiene following a bm and clothing management       Ambulation 1  Surface: level tile  Device: Rolling Walker  Assistance: Minimal assistance;Contact guard assistance  Quality of Gait: decreased velocity and satnam,  freq. path deviation with pt. bumping front wheel of walker into 1 object during amb.,decreased step length B, poor heel strike B,forward flexed trunk and flexed knees throughout  Distance: 100' x 2  Comments: freq. vc for directions.  pt. reporting b le's feeling \"very tired\" toward end of 100' distances            TCU Balance:      TCU BALANCE TEST: 0 1 2 8   Balance during to/from sit to stand   x    Balance during walking   x    Balance during turn-around and while walking   x    Balance moving on and off toilet   x    Balance during surface to/from surface transfers    x   0 = Steady at all times  1 = Not steady, but able to stabilize without human assistance  2 = Not steady, only able to stabilize with human assistance  8 = Activity did not occur           Exercises:  Exercises  Comments:  ther. ex seated  in str. back chair in lobby  pt. completed 8-10 reps each heel-toe raises and laq and marching with verbal visual and manual cues. ther. ex to increase strength and improved mobility. freq. rests needed           Activity Tolerance:  Activity Tolerance: Patient limited by cognitive status; Patient limited by endurance    Assessment: Body structures, Functions, Activity limitations: Decreased functional mobility , Decreased ADL status, Decreased safe awareness, Decreased endurance, Decreased balance, Decreased cognition  Assessment: patient tolerated evaluation and session fair. Demos decreased strength of B LEs, decreased balance, and decreased functional tolerance to activity requiring brief seated rest break following ambulation and exercise performance. O2 sats 88% following ambulation and able to return to >90% within 30\" with cues for proper breathing. WIll benefit from skilled PT to improve level of mobility, safety, balance, gait mechanics.    Discharge Recommendations: ECF with PT    Patient Education:  Patient Education: POC, bed mobility, transfers, gait mechanics, performance of exercises, balance, breathing techniques    Equipment Recommendations:  Equipment Needed: No  Other: continue to assess    Safety:  Type of devices: Call light within reach, Chair alarm in place, Gait belt, Left in chair, All fall risk precautions in place    Plan:  Times per week: 6x  Specific instructions for Next Treatment: bed mobility, transfers, safety awareness, gait training, functional tolerance to activity, standing balance  Current Treatment Recommendations: Strengthening, Balance Training, Functional Mobility Training, Transfer Training, Endurance Training, Gait Training, Stair training, Home Exercise Program, Safety Education & Training, Equipment Evaluation, Education, &

## 2017-10-08 NOTE — PLAN OF CARE
in place. Bed and chair alarms in use. Call light in reach. Purposeful hourly rounding. Problem: Risk for Impaired Skin Integrity  Goal: Tissue integrity - skin and mucous membranes  Structural intactness and normal physiological function of skin and  mucous membranes. Outcome: Ongoing  Skin assessed daily for breakdown. Wounds monitored for healing.

## 2017-10-08 NOTE — PROGRESS NOTES
from back   [address]  2010    melanoma    TURP  1990         Subjective  Subjective: Pt lying in bed upon arrival. Agreeable to OT session  Orientation Level: Oriented to person;Disoriented to place;Oriented to time;Disoriented to situation (Reoriented to place and situation)     Pain:  Pain Assessment  Patient Currently in Pain: Denies     Objective  Overall Cognitive Status: Exceptions  Following Commands: Follows one step commands with increased time  Attention Span: Difficulty attending to directions  Memory: Decreased short term memory;Decreased long term memory  Safety Judgement: Decreased awareness of need for assistance;Decreased awareness of need for safety  Insights: Decreased awareness of deficits  Initiation: Requires cues for some    ADL  Grooming: Setup (for completion of hair care after washed by therapist)  LE Dressing: Moderate assistance (to don undergarment and shoes seated EOB. Pt able to assist minimally with pulling up over hips while standing with CGA- cueing provided for initiation of task, extended time provided)      Bed mobility  Rolling to Right: Stand by assistance (using side rail)  Supine to Sit: Minimal assistance (extended time with verbal cues for proper and safe technique)  Scooting: Contact guard assistance (scooting hips towards EOB)    Transfers  Sit to stand: Minimal assistance (from bedside chair x2 trials and EOB x1 trial)  Stand to sit: Contact guard assistance (to bedside chair)  Transfer Comments: Minimal verbal cueing provided for proper hand placement during transfers     Balance  Sitting Balance: Stand by assistance (seated EOB; Supervision seated in chair)  Standing Balance: Contact guard assistance (during LB dressing tasks)       Functional Mobility  Functional - Mobility Device: Rolling Walker  Activity: Other  Assist Level: Contact guard assistance  Functional Mobility Comments: From EOB to bedside chair. No LOB noted, slight unsteadiness noted.  1 vc required for safety     Type of ROM/Therapeutic Exercise: Free weights  Comment: Pt completed BUE exercises x10 reps x1 set in all joints/planes using 1# dumb bell seated in chair. Limited ROM noted in L shoulder. Pt required rest breaks after each exercise. Completed exercises to increase strength and activity tolerance required for ADLs and toilet transfers     TCU BALANCE TEST: 0 1 2 8   Balance during to/from sit to stand   x    Balance during walking   x    Balance during turn-around and while walking   x    Balance moving on and off toilet    x   Balance during surface to/from surface transfers    x   0 = Steady at all times  1 = Not steady, but able to stabilize without human assistance  2 = Not steady, only able to stabilize with human assistance  8 = Activity did not occur     Activity Tolerance:  Activity Tolerance: Patient Tolerated treatment well    Assessment:  Performance deficits / Impairments: Decreased functional mobility , Decreased ADL status, Decreased ROM, Decreased strength, Decreased safe awareness, Decreased cognition, Decreased endurance, Decreased balance  Prognosis: Fair  Discharge Recommendations: 24 hour supervision or assist, ECF with OT, Home with Home health OT (Will require increased level of care.  ECF vs back to AL?)    Patient Education:  Patient Education: safety with transfers and mobility, bed mobility, BUE exercises, ADL strategies    Equipment Recommendations:  Equipment Needed: No    Safety:  Safety Devices in place: Yes  Type of devices: Call light within reach, Left in bed, Chair alarm in place, Gait belt    Plan:  Times per week: 6x  Current Treatment Recommendations: Strengthening, ROM, Balance Training, Functional Mobility Training, Endurance Training, Safety Education & Training, Patient/Caregiver Education & Training, Equipment Evaluation, Education, & procurement, Self-Care / ADL    Goals:  Short term goals  Time Frame for Short term goals: 1 week  Short term goal 1:

## 2017-10-09 NOTE — PROGRESS NOTES
2010    melanoma    TURP  1990           Subjective  Subjective: Pt seated in bedside chair upon arrival. Agreeable to OT session    Overall Orientation Status: Impaired  Orientation Level: Oriented to person;Disoriented to place;Oriented to time;Disoriented to situation     Pain:  Pain Assessment  Patient Currently in Pain: Denies     Objective  Overall Cognitive Status: Exceptions  Following Commands: Follows one step commands with increased time  Memory: Decreased short term memory;Decreased long term memory  Safety Judgement: Decreased awareness of need for assistance;Decreased awareness of need for safety  Insights: Decreased awareness of deficits  Initiation: Requires cues for some  Sequencing: Requires cues for all    ADL  Grooming: Setup (seated in chair to comb hair after washed by therapist)  UE Bathing: Supervision (seated in bedside chair after setup)  LE Bathing: Minimal assistance (for completion of bottom while standing. Pt able to wash alexa area with cueing provided for thoroughness. Pt able to wash BLE below knees seated in chair with SBA. Soaked B feet in basin)  UE Dressing: Minimal assistance;Verbal cueing (to doff gown and don tshirt seated in chair, verbal cueing provided)  LE Dressing: Moderate assistance (to don shoes, socks, and thread RLE into shorts/undergarment. Able to thread LLE into undergarment/shorts with extended time provided.  Able to pull up over hips while standing with CGA - cueing provided)     Transfers  Sit to stand: Minimal assistance (from bedside chair)  Stand to sit: Contact guard assistance (to bedside chair)  Transfer Comments: Minimal verbal cueing provided for proper hand placement during transfers     Balance  Sitting Balance: Stand by assistance  Standing Balance: Contact guard assistance     TCU BALANCE TEST: 0 1 2 8   Balance during to/from sit to stand   x    Balance during walking    x   Balance during turn-around and while walking    x   Balance moving on and 2 weeks  Long term goal 1: Pt will complete toilet transfer with CGA and <2 VC's for safety to increase endurance for toileting routine  Long term goal 2: Pt will complete BUE AROM exercise with yellow tband and <3 rest breaks to increase strength required for self care tasks  Long term goal 3: Pt will complete X4 min standing ADL with CGA to increase independence with ADL routine

## 2017-10-09 NOTE — PATIENT CARE CONFERENCE
[address] [address]  Transitional Care Unit  Team Conference Note    Patient Name: Eddi Gagnon   MRN: [id number]    : 1920  (80 y.o.)  Gender: male   Referring Practitioner: Ordering: Scott Blackwood. Attending: NATALIE Hill MD  Diagnosis: SOB    Team Conference Date: 10/10/2017   Admission Date:     4:09 PM  Re-Certification Date:     :  Tentative Discharge Plan and current psychosocial issues: The patient is a 79 y/o  who resides at the [address] [place] [address] assisted living facility. He receives all needed help from the facility staff and also has some amount of private duty help. Home health therapies through [employer] were also being provided at the [address] [place] [address]. The patient is pleasant, but sleepy, and confused, but states that he will return back to his apartment at time of discharge. Patient's support system is limited to facility staff & helpers, and his 2 sons, neither of whom live near. His son Rodman Dakin is his POA and resides in [facility]. Son [place] lives [place]state, and coordinates the private duty help patient has been receiving. The [facility] has stated that they accept him back as long as he is medically stable.     Nursing:    Wounds/Incisions/Ulcers:  Skin/Wound Issues: abrasion on left shin open to air  Bowel: continent  Bladder:continent     Pain: Patient's pain is currently controlled with tylenol and lidocaine patch    Education Provided:  Diabetic:  Yes without meds  Peg Tube:NA    Abraham Care:  Education:NA  Removal Necessary:  NA  Supplies Needed: NA     Anticoagulant Use:  Patient on lovenox and ASA for anticoagulation, which is being managed by Primary Care Physician    Antibiotic Use:  Patient not on antibiotics    Psychotropic Medication Use:  Patient on buspar and seroquel     Oxygen Use: No    Home Medications Reconciled: No    Physical Therapy:   Bed Mobility  Scooting: Contact guard assistance (scooting hips towards EOB)  Transfers  Sit showed no aspiration at that time.  Recommend advancing pt to Dental soft and nectar liquids with close pulmonary monitoring.  Further instrumental assessment will be completed if pulmonary status declines and silent aspiration is suspected.  Recommend follow up with full speech and language evaluation as well. Pt would benefit from continued speech therapy services. Nutrition:    Weight: 151 lb 1.6 oz (68.5 kg) / Body mass index is 21.68 kg/m². Please see nutrition note for details. Family Education: No family available for education  Fall Risk:  Falling star program initiated    Goal Achievement:   Patient Continues to progress toward goal achievement but slowly    Discharge Plan   Estimated Length of Stay: 10/17/17  Destination: assisted living  Services at Discharge: [facility], Occupational Therapy and Speech Therapy 3x week  Equipment at Discharge: No equipment needs  Factors facilitating achievement of predicted outcomes: Motivated, Cooperative and Pleasant  Barriers to the achievement of predicted outcomes: [facility]    Readmission Risk              Readmission Risk:        21.5       Age 72 or Greater:  1    Admitted from SNF or Requires Paid or Family Care:  2    Currently has CHF,COPD,ARF,CRI,or is on dialysis:  0    Takes more than 5 Prescription Medications:  4    Takes Digoxin,Insulin,Anticoagulants,Narcotics or ASA/Plavix:  [address] in Past 12 Months:  10    On Disability:  0    Patient Considers own Health:  2.5        High (20-31)         Team Members Present at Conference:  Physician: Dr. Betty Beckman MD  Nurse: Cristian Garcia RN  :  Brando Christie [place]  Occupational Therapist:  BOB Ca  Physical Therapist:   Guillermina Riedel, TONO  Speech Therapist: Speech Therapist: AMARJIT Ramirez .     All team members have reviewed and updated as necessary and appropriate the established interdisciplinary plan of care within the medical record Jose Manuel Basilio. Pt's team conference attended (see above). Pt seen on rounds with LSW, to review the results with patient and family. Discussed length of stay as above. Pt's team conference attended (see above.)  Pt seen on rounds with LSW, to review the results with patient and family. Discussed length of stay as above.     Physical Exam:  General:  Alert and oriented  Vitals:   Vitals:    10/09/17 2215   BP: (!) 164/73   Pulse: 64   Resp: 16   Temp: 96.1 °F (35.6 °C)   SpO2: 94%     Heart:  Regular rate and rhythm  Lungs:  Clear to auscultation  Abdomen:  soft with positive bowel sounds     Assessment:  Progressing in full rehabilitation program (see above)    Plan:  Continue Rehab            Continue current medications            Spent 35 minutes today in patient management and care  Electronically signed by Ivett Torres MD on 10/10/2017 at 8:14 AM

## 2017-10-09 NOTE — FLOWSHEET NOTE
During my contact with the Palliative Care patient, there were no family members present and no response. I offered a significant prayer of comfort and healing at pts bedside. I also placed a spiritual care card in the room. Spiritual plan: It would be helpful if Spiritual Care would continue to follow up on this case. 10/09/17 1500   Encounter Summary   Services provided to: Patient   Referral/Consult From: Cy   Continue Visiting Yes  (10/9/17 Palliative Care pt.  Sleeping)   Complexity of Encounter Moderate   Length of Encounter 15 minutes   Routine   Type Follow up   Assessment Sleeping   Intervention Prayer   Outcome Coping   Spiritual/Sikh   Type Spiritual support

## 2017-10-09 NOTE — PROGRESS NOTES
Restrictions/Precautions:  Restrictions/Precautions: General Precautions, Fall Risk                      Subjective:     Subjective: Pt pleasant and cooerpative. Pleasantly confused but easy to direct/cue. Pt left up in bs chair with pad alarm on.      Pain:   .      denies    Social/Functional:  Type of Home: Assisted living (Pt unable to identify assisted living facility; per notes pt stays at [facility])  Home Layout: One level  Home Access: Level entry  Home Equipment: Rolling walker     Objective:  Supine to Sit: Moderate assistance    Transfers  Sit to Stand: Minimal Assistance (EOB, EOM, bs chair - cues for hand placement )  Stand to sit: Minimal Assistance       Ambulation 1  Surface: level tile  Device: Rolling Walker  Assistance: Minimal assistance  Quality of Gait: many cues for posture , path finding and to control walker to keep from running into objects on left side; physical assist to get and then keep pt closer inside walker   Distance: approx 45 feet x  1 and 170 feet x 1          TCU Balance:      TCU BALANCE TEST: 0 1 2 8   Balance during to/from sit to stand   x    Balance during walking   x    Balance during turn-around and while walking   x    Balance moving on and off toilet    x   Balance during surface to/from surface transfers   x    0 = Steady at all times  1 = Not steady, but able to stabilize without human assistance  2 = Not steady, only able to stabilize with human assistance  8 = Activity did not occur             Exercises:  Exercises  Comments: supine amanda heelcord stretches 3 x 30 seconds;   supine amanda LE therex AP, heelsldies, hip ABD/ADD, SLR, LTR all x 10 reps each with constant physical assist/tactile cueing for techniques and full ROM's;   standing for 4 minutes at blocked walker playing ring toss reaching within RENE wasit level to shoulder height- CGA x 1 for improved upright endurance;   seated EOM LAQ, marches, HS curls, hip ABD/ADD and ball squeezes all x 10 reps each improve from 6 to 8 for improved safety and stability and decrease risk of falls    Long term goals  Time Frame for Long term goals : 3 weeks  Long term goal 1: patient to perform supine to sit at SBA to get out of bed  Long term goal 2: patient to perform sit to supine at S to get in to bed  Long term goal 3: patient to perform transfers from various surfaces at SBA consistently to rise from bed or chair  Long term goal 4: patient to ambulate 150ft with RW at A for mobility within assisted living  Long term goal 5: patient score on elderly mobility scale will improve from 6 to 12 for improved safety and stability and decrease risk of falls

## 2017-10-09 NOTE — PROGRESS NOTES
[address]    TIME   SLP Individual Minutes  Time In: 1100  Time Out: 3030  Minutes: 15  Timed Code Treatment Minutes: 0 Minutes       []Daily Note  [x]Progress Note  []Discharge Note    Date: 10/9/2017  Patient Name: Cassie Gregg        MRN: [id number]    : 1920  (80 y.o.)  Gender: male   Primary Provider: Nathaly Jacques MD  Admitting Diagnosis:  Pneumonia   Secondary Diagnosis: Dysphagia   Precautions: aspiration precautions   Swallowing Status/Diet: soft diet with nectar thick liquids   Swallowing Strategies: slow rate; upright position; thickened liquids   DATE of last MBS:  BSE on 10/6/17    Subjective: Pt seen at bedside for tx. Pt pleasant and cooperative, however fatigued with poor ability to maintain alertness throughout tx session. SHORT TERM GOAL #1:  Goal 1: Pt will tolerate Dental soft and nectar diet without overt aspiration to ensure safe and adequate nutrition and hydration. GOAL MET. CONTINUE GOAL. INTERVENTIONS:Pt tolerated multiple trails of nectar thick liquids via cup drink with good success. Mild swallow delay noted, however no overt s/s of aspiration noted on this date. Pt declined trials of solids at this time. Pt unable to clearly recall success or difficulty with breakfast on this date. Recommend continue with current diet and monitor via direct meal intervention to ensure consistency of performance and r/o fatigue over the course of the meal.      SHORT TERM GOAL #2:  Goal 2: Monitor pulmonary status and complete further instrumental assessment if indicated. GOAL MET. CONTINUE GOAL. INTERVENTIONS:  Pulmonary status stable, instrumental not indicated at this time. SHORT TERM GOAL #3:  Goal 3: Complete full speech/language/cognitive assessment and establish POC   GOAL NOT MET. CONTINUE GOAL.    INTERVENTIONS: DNT on this date d/t significant lethargy and poor alertness     Long-term Goals  Timeframe for Long-term Goals: 4 weeks  Goal 1: Pt will tolerate least restricitie diet without difficulty to ensure safe and adequate long term hydration and nutrition. GOAL MET. CONTINUE GOAL. ASSESSMENT:  Assessment: [x]Progressing towards goals          []Not Progressing towards goals     Pt continues to present with mid oral and pharnygeal dysphagia with overall decreased endurance affecting swallowing function. No overt aspiration was seen with honey or nectar thick liquids during bedside swallow evaluation or on this date, although silent aspiration cannot be ruled out. Pt did have MBS approx 1 year ago which showed no aspiration at that time. Recommend advancing pt to Dental soft and nectar liquids with close pulmonary monitoring. Further instrumental assessment will be completed if pulmonary status declines and silent aspiration is suspected. Recommend follow up with full speech and language evaluation as well. Pt would benefit from continued speech therapy services.         Patient Tolerance of Treatment:   []Tolerated well [x]Tolerated fair []Required rest breaks [x]Fatigued  Plan for Next Session: *continue with current POC   Education:  Learner:  [x]Patient          []Significant Other          []Other  Education provided regarding:  [x]Goals and POC   []Diet and swallowing precautions    []Home Exercise Program  []Progress and/or discharge information  Method of Education:  [x]Discussion          []Demonstration          []Handout          []Other  Evaluation of Education:   [x]Verbalized understanding   []Demonstrates without assistance  []Demonstrates with assistance  []Needs further instruction     []No evidence of learning                  [x]No family present      Plan: [x]Continue with current plan of care    []Modify current plan of care as follows:    []Discharge patient    Discharge Location:    Services/Supervision Recommended:     [x]Patient continues to require treatment by a licensed therapist to address functional deficits as outlined in the established plan of care.     Radha Andrews MS CCC/SLP  [id number]

## 2017-10-10 PROBLEM — F41.9 ANXIETY: Status: ACTIVE | Noted: 2017-01-01

## 2017-10-10 NOTE — PLAN OF CARE
Problem: Impaired respiratory status  Goal: Clear lung sounds  Outcome: Ongoing  Breath sounds are clear and diminished at this time. Continue with treatments to help improve breath sounds.

## 2017-10-10 NOTE — PROGRESS NOTES
least restricitie diet without difficulty to ensure safe and adequate long term hydration and nutrition. ASSESSMENT:  Assessment: [x]Progressing towards goals          []Not Progressing towards goals      Patient Tolerance of Treatment:   [x]Tolerated well []Tolerated fair []Required rest breaks [x]Fatigued  Plan for Next Session: Diet texture analysis during meal  Education:  Learner:  [x]Patient          []Significant Other          []Other  Education provided regarding:  [x]Goals and POC   []Diet and swallowing precautions    []Home Exercise Program  []Progress and/or discharge information  Method of Education:  [x]Discussion          []Demonstration          []Handout          []Other  Evaluation of Education:   [x]Verbalized understanding   []Demonstrates without assistance  []Demonstrates with assistance  []Needs further instruction     []No evidence of learning                  [x]No family present      Plan: [x]Continue with current plan of care    []Modify current plan of care as follows:    []Discharge patient    Discharge Location:    Services/Supervision Recommended:     [x]Patient continues to require treatment by a licensed therapist to address functional deficits as outlined in the established plan of care. Trinidad Levy.  [address] [facility] [address], [address]

## 2017-10-10 NOTE — PROGRESS NOTES
[facility]  Recreational Therapy  Daily Note  [facility] TCU 8E    Time Spent with Patient: 10 minutes    Date:  10/10/2017       Patient Name: Mary Altamirano      MRN: [id number]      YOB: 1920 (80 y.o.)       Gender: male  Diagnosis: pneumonia, SOB  Referring Practitioner: Dr. Andrea Loyd    RESTRICTIONS/PRECAUTIONS:  Restrictions/Precautions: General Precautions, Fall Risk  Vision: Impaired  Hearing: Exceptions to [facility]  Hearing Exceptions: Hard of hearing/hearing concerns    PAIN: 0-no c/o pain     SUBJECTIVE:  I would like to get a hair cut    OBJECTIVE:  Pt interested in getting his hair cut by our beautician this afternoon-will pay for pt and family can pay me later-pt appreciative          Patient Education  New Education Provided: Importance of Leisure,     Electronically signed by: WAQAR HallmanS  Date: 10/10/2017

## 2017-10-10 NOTE — PROGRESS NOTES
Restrictions/Precautions:  Restrictions/Precautions: General Precautions, Fall Risk          Subjective:  Comments:    Subjective: In chair upon arrival and agreeable to therapy. Cooperative and Pleasantly confused but speaking into right ear was easily redirected. Required use of commode. Left in chair after session. Pain:   .  Pain Assessment  Pain Level: 0       Social/Functional:  Type of Home: Assisted living (Pt unable to identify assisted living facility; per notes pt stays at [facility])  Home Layout: One level  Home Access: Level entry  Home Equipment: Rolling walker     Objective:       Transfers  Sit to Stand: Minimal Assistance  Stand to sit: Minimal Assistance       Ambulation 1  Surface: level tile  Device: Rolling Walker  Assistance: Contact guard assistance  Quality of Gait: many cues for posture, path finding and to control walker to keep from running into objects on left side; physical assist to get and then keep pt closer inside walker   Distance: 70'x1 and 85'x1  Comments:           Balance  Comments: Dynamic standing balance training, tolerated 8 minutes, CGA/SBA, lateral stepping in parallel bars, directional changes in tight spaces, reaching outside RENE for object retrieval and placement, fwd tossing, detection in limits of stability, and training to increase balance skills and reduce risk of falls.       U BALANCE TEST: 0 1 2 8   Balance during to/from sit to stand   x    Balance during walking   x    Balance during turn-around and while walking   x    Balance moving on and off toilet   x    Balance during surface to/from surface transfers    x   0 = Steady at all times  1 = Not steady, but able to stabilize without human assistance  2 = Not steady, only able to stabilize with human assistance  8 = Activity did not occur       Exercises:  Exercises  Comments: Seated BLE exercises x15 for LAQ, marches, ankle pumps, hip abd/add, and chair push-ups to increase strength and stability

## 2017-10-10 NOTE — PLAN OF CARE
Problem: Nutrition  Goal: Optimal nutrition therapy  This nurse assisted patient with eating. Patient does consume more if staff assists with feeding. Problem: Skin Integrity:  Goal: Absence of new skin breakdown  Absence of new skin breakdown   Outcome: Ongoing  Left shin remains with hard scabbed abrasion about dime sized. This area is without redness or drainage. Patient denies pain in this area.

## 2017-10-10 NOTE — PROGRESS NOTES
[address]  INPATIENT OCCUPATIONAL THERAPY  [facility] TCU 8E  DAILY NOTE    Time:  Time In: 0700  Time Out: 1618  Timed Code Treatment Minutes: 48 Minutes  Minutes: 53     Date: 10/10/2017  Patient Name: Jorge A Morrison,   Gender: male      Room: 8E-56/056-A  MRN: [id number]  : 1920  (80 y.o.)  Referring Practitioner: Dr. Linwood Herron  Diagnosis: pneumonia, SOB  Additional Pertinent Hx: 80 y.o. male with a past medical history of CAD, A-Fib, cancer, and chronic pain. He presents to the emergency department for evaluation of a fever and a cough. The patient is hard of hearing and does have an aid with him at bedside. He reports that he has been coughing up phlegm as well. He does have some back pain due to a fall that occurred two weeks ago. He has not developed any cuts or rashes from the fall but does appear to have multiple bruises. He does states that he has had hematochezia but this is not uncommon for him. The patient is on a blood thinner as well and is allergic to Penicillin. He denies any chest pain, shortness of breath, abdominal pain, urinary problems, headache, dizziness, lightheadedness, loss of consciousness, or nausea.       Restrictions/Precautions:  Restrictions/Precautions: General Precautions, Fall Risk    Past Medical History:   Diagnosis Date    Atrial fibrillation ([facility] 2015    CAD (coronary artery disease)     Cancer (HCC)     skin- removed    Chronic kidney disease     Chronic pain     legs    Diabetes mellitus ([address]     [place] (hard of hearing)     Hypertension     Macular degeneration     MDRO (multiple drug resistant organisms) resistance     Medtronic loop recorder 2013    Unspecified cerebral artery occlusion with cerebral infarction     TIA x2     Past Surgical History:   Procedure Laterality Date    EYE SURGERY      laser detached retina    HERNIA REPAIR  1942, 1950    x2    [address] from back   [facility] No LOB, slight unsteadiness noted at times. Min vcs required for walker safety. Seated rest break required after each trial of mobility  Apparatus Needs: Wheelchair follow     Type of ROM/Therapeutic Exercise: Free weights  Comment: Pt completed BUE exercises x10 reps x1 set in all joints/planes using 1# dumb bell seated in chair. Limited ROM noted in L shoulder. Pt required rest breaks after each exercise.  Completed exercises to increase strength and activity tolerance required for ADLs and toilet transfers     TCU BALANCE TEST: 0 1 2 8   Balance during to/from sit to stand   x    Balance during walking   x    Balance during turn-around and while walking   x    Balance moving on and off toilet   x    Balance during surface to/from surface transfers    x   0 = Steady at all times  1 = Not steady, but able to stabilize without human assistance  2 = Not steady, only able to stabilize with human assistance  8 = Activity did not occur     Activity Tolerance:  Activity Tolerance: Treatment limited secondary to decreased cognition;Patient Tolerated treatment well    Assessment:  Performance deficits / Impairments: Decreased functional mobility , Decreased ADL status, Decreased ROM, Decreased strength, Decreased safe awareness, Decreased cognition, Decreased endurance, Decreased balance  Prognosis: Fair  Discharge Recommendations: 24 hour supervision or assist, Home with Home health OT (Assisted living with increased level of care)    Patient Education:  Patient Education: safety with transfers and mobility, ADL strategies, BUE exercises    Equipment Recommendations:  Equipment Needed: No    Safety:  Safety Devices in place: Yes  Type of devices: Call light within reach, Left in bed, Chair alarm in place, Gait belt    Plan:  Times per week: 6x  Current Treatment Recommendations: Strengthening, ROM, Balance Training, Functional Mobility Training, Endurance Training, Safety Education & Training, Patient/Caregiver Education &

## 2017-10-10 NOTE — PROGRESS NOTES
Team Conference held this morning. Recommendations of the team were explained to the patient by Dr Candy Orozco and . Team is recommending that patient continue on TCU for another week, with expected discharge date of Tuesday, 10/17/17. HAZEL has left a phone message for Carlitos Hinds at [facility], with an update from the [facility]. HAZEL asked Marsha Salvadorgracie to contact her if patient returning to [facility] on a soft diet with nectar thick liquids would be an issue. Waiting for her return call. HAZEL has also called and left a lengthy message for patient's son and POA, Ed, regarding the 10/10 TC. The TCU Team recommended continued care and assistance from the private-duty aides patient had at [facility] prior to this hospitalization, along with continued PT/OT/ST. LUC Hinds, of [facility], called and said patient's soft diet with nectar thick liquids would not be an issue for the AL. She also asked that the order for any home health therapy be sent to her at patient's discharge and she will arrange services through the therapy company [facility] has a contract with.

## 2017-10-11 NOTE — PROGRESS NOTES
[address]  INPATIENT OCCUPATIONAL THERAPY  [facility] TCU 8E  DAILY NOTE    Time:  Time In: 1000  Time Out: 1045  Timed Code Treatment Minutes: 39 Minutes  Minutes: 45          Date: 10/11/2017  Patient Name: Mary Altamirano,   Gender: male      Room: 8E-56/056-A  MRN: [id number]  : 1920  (80 y.o.)  Referring Practitioner: Dr. Nathaly Romo  Diagnosis: pneumonia, SOB  Additional Pertinent Hx: 80 y.o. male with a past medical history of CAD, A-Fib, cancer, and chronic pain. He presents to the emergency department for evaluation of a fever and a cough. The patient is hard of hearing and does have an aid with him at bedside. He reports that he has been coughing up phlegm as well. He does have some back pain due to a fall that occurred two weeks ago. He has not developed any cuts or rashes from the fall but does appear to have multiple bruises. He does states that he has had hematochezia but this is not uncommon for him. The patient is on a blood thinner as well and is allergic to Penicillin. He denies any chest pain, shortness of breath, abdominal pain, urinary problems, headache, dizziness, lightheadedness, loss of consciousness, or nausea.       Restrictions/Precautions:  Restrictions/Precautions: General Precautions, Fall Risk                      Past Medical History:   Diagnosis Date    Atrial fibrillation ([address] 2015    CAD (coronary artery disease)     Cancer (HCC)     skin- removed    Chronic kidney disease     Chronic pain     legs    Diabetes mellitus ([address]     [place] (hard of hearing)     Hypertension     Macular degeneration     MDRO (multiple drug resistant organisms) resistance     Medtronic loop recorder 2013    Unspecified cerebral artery occlusion with cerebral infarction     TIA x2     Past Surgical History:   Procedure Laterality Date    EYE SURGERY      laser detached retina    HERNIA REPAIR  1942, 1950    x2    [address] removed from back  SKIN CANCER EXCISION  2010    melanoma    TURP  1990           Subjective       Subjective: pt lying in bed when arrived. pt agreeable to OT session    Overall Orientation Status: Impaired  Orientation Level: Oriented to person;Disoriented to place; Disoriented to situation;Disoriented to time     Pain:  Pain Assessment  Patient Currently in Pain: Denies       Objective  Following Commands: Follows one step commands with repetition; Follows one step commands with increased time         Bed mobility  Supine to Sit: Minimal assistance    Transfers  Sit to stand: Minimal assistance (from EOB and arm chair )  Stand to sit: Minimal assistance (to arm chair and bedside chair )  Transfer Comments: Minimal verbal cueing provided for proper hand placement during transfers       Balance  Sitting Balance: Stand by assistance  Standing Balance: Contact guard assistance     Time: 2 min x4 evants   Activity: dynamic standing at walker for reaching tasks with 1 hand release for increase balance. X2 events using BUE rlease of walker  standing for folding of towels with 2 hand release to increase stanidng balance      Functional Mobility  Functional - Mobility Device: Rolling Walker  Activity: Other  Assist Level: Contact guard assistance  Functional Mobility Comments: pt completed funtional mobility to and from therapy gym at slow pace and no LOB. Chris Frey for walker safety. seated rest breaks required after each mobility         Type of ROM/Therapeutic Exercise: AROM  Comment: BUE red theraband exercises seated in arm chair. x10 reps x1 in all joints/planes. Pt required rest breaks after each exercise.  Pt completed exercises to increase strength and activity tolerance required for ADLS and toliet transfers          TCU BALANCE TEST: 0 1 2 8   Balance during to/from sit to stand   x    Balance during walking   x    Balance during turn-around and while walking   x    Balance moving on and off toilet    x   Balance during surface to/from surface transfers    x   0 = Steady at all times  1 = Not steady, but able to stabilize without human assistance  2 = Not steady, only able to stabilize with human assistance  8 = Activity did not occur         Activity Tolerance:  Activity Tolerance: Treatment limited secondary to decreased cognition;Patient Tolerated treatment well  Activity Tolerance: Pt demos confusion on this date; mod to max VC's to redirect and complete session    Assessment:     Performance deficits / Impairments: Decreased functional mobility , Decreased ADL status, Decreased ROM, Decreased strength, Decreased safe awareness, Decreased cognition, Decreased endurance, Decreased balance  Prognosis: Fair  Discharge Recommendations: 24 hour supervision or assist, Home with Home health OT (Assisted living with increased level of care)    Patient Education:  Patient Education: safety with transfers and mobility    Equipment Recommendations:  Equipment Needed: No    Safety:  Safety Devices in place: Yes  Type of devices: Call light within reach, Patient at risk for falls, Chair alarm in place, Left in chair, Gait belt    Plan:  Times per week: 6x  Current Treatment Recommendations: Strengthening, ROM, Balance Training, Functional Mobility Training, Endurance Training, Safety Education & Training, Patient/Caregiver Education & Training, Equipment Evaluation, Education, & procurement, Self-Care / ADL    Goals:       Short term goals  Time Frame for Short term goals: 1 week  Short term goal 1: Pt will complete various t/fs including toilet transfer with min A & min vcs for UE placement to increase safety during toileting routine  Short term goal 2: Pt will complete 2-3 min standing ADL with CGA for increased ease of sinkside grooming  Short term goal 3: Pt will complete functional mobility to/from bathroom with RW & CGA to to increase strength for ADLs  Short term goal 4: Pt will complete BUE gentle AROM exercises and min VC's for tech to increase endurance for BADL  Long term goals  Time Frame for Long term goals : 2 weeks  Long term goal 1: Pt will complete toilet transfer with CGA and <2 VC's for safety to increase endurance for toileting routine  Long term goal 2: Pt will complete BUE AROM exercise with yellow tband and <3 rest breaks to increase strength required for self care tasks  Long term goal 3: Pt will complete X4 min standing ADL with CGA to increase independence with ADL routine

## 2017-10-11 NOTE — PROGRESS NOTES
3210    Patient remained confused this shift    Patient yelled out \"Hello\"  Off and               On all night    Patient is not aware that he is yelling out    Some times he               Needed to use the urinal but most of the time he did not need anything               Seroquel was given this shift    He rested very little this shift

## 2017-10-11 NOTE — PROGRESS NOTES
[address]    Time In: 3182  Time Out: 6958  Timed Code Treatment Minutes: 38 Minutes  Minutes: 38          Date: 10/11/2017  Patient Name: Carlos Walker,  Gender:  male        MRN: [id number]  : 1920  (80 y.o.)  Referral Date : 10/04/17  Referring Practitioner: Ordering: Cristy Jefferson. Attending: D. Denver Medina, MD  Diagnosis: SOB  Additional Pertinent Hx:  Carlos Walker is a 80 y.o. male with a past medical history of CAD, A-Fib, cancer, and chronic pain. He presents to the emergency department for evaluation of a fever and a cough. The patient is hard of hearing and does have an aid with him at bedside. He reports that he has been coughing up phlegm as well. He does have some back pain due to a fall that occurred two weeks ago. He has not developed any cuts or rashes from the fall but does appear to have multiple bruises. He does states that he has had hematochezia but this is not uncommon for him. The patient is on a blood thinner as well and is allergic to Penicillin. He denies any chest pain, shortness of breath, abdominal pain, urinary problems, headache, dizziness, lightheadedness, loss of consciousness, or nausea.       Past Medical History:   Diagnosis Date    Atrial fibrillation ([address] 2015    CAD (coronary artery disease)     Cancer (HCC)     skin- removed    Chronic kidney disease     Chronic pain     legs    Diabetes mellitus ([address]     [place] (hard of hearing)     Hypertension     Macular degeneration     MDRO (multiple drug resistant organisms) resistance     Medtronic loop recorder 2013    Unspecified cerebral artery occlusion with cerebral infarction     TIA x2     Past Surgical History:   Procedure Laterality Date    EYE SURGERY      laser detached retina    HERNIA REPAIR  1942, 1950    x2    [facility] from back   [address]      [address] [facility] 80ft with RW at [facility] for functional mobility at assisted living  Short term goal 5: patient score on elderly mobility scale will improve from 6 to 8 for improved safety and stability and decrease risk of falls    Long term goals  Time Frame for Long term goals : 3 weeks  Long term goal 1: patient to perform supine to sit at SBA to get out of bed  Long term goal 2: patient to perform sit to supine at S to get in to bed  Long term goal 3: patient to perform transfers from various surfaces at SBA consistently to rise from bed or chair  Long term goal 4: patient to ambulate 150ft with RW at A for mobility within assisted living  Long term goal 5: patient score on elderly mobility scale will improve from 6 to 12 for improved safety and stability and decrease risk of falls

## 2017-10-12 NOTE — PROGRESS NOTES
[facility]    Time In: 5450  Time Out: 1340  Timed Code Treatment Minutes: 28 Minutes  Minutes: 28          Date: 10/12/2017  Patient Name: Michel Canas,  Gender:  male        MRN: [id number]  : 1920  (80 y.o.)  Referral Date : 10/04/17  Referring Practitioner: Ordering: Ed Bardales. Attending: NATALIE Hua MD  Diagnosis: SOB  Additional Pertinent Hx:  Michel Canas is a 80 y.o. male with a past medical history of CAD, A-Fib, cancer, and chronic pain. He presents to the emergency department for evaluation of a fever and a cough. The patient is hard of hearing and does have an aid with him at bedside. He reports that he has been coughing up phlegm as well. He does have some back pain due to a fall that occurred two weeks ago. He has not developed any cuts or rashes from the fall but does appear to have multiple bruises. He does states that he has had hematochezia but this is not uncommon for him. The patient is on a blood thinner as well and is allergic to Penicillin. He denies any chest pain, shortness of breath, abdominal pain, urinary problems, headache, dizziness, lightheadedness, loss of consciousness, or nausea.       Past Medical History:   Diagnosis Date    Atrial fibrillation ([address] 2015    CAD (coronary artery disease)     Cancer (HCC)     skin- removed    Chronic kidney disease     Chronic pain     legs    Diabetes mellitus ([address]     [place] (hard of hearing)     Hypertension     Macular degeneration     MDRO (multiple drug resistant organisms) resistance     Medtronic loop recorder 2013    Unspecified cerebral artery occlusion with cerebral infarction     TIA x2     Past Surgical History:   Procedure Laterality Date    EYE SURGERY      laser detached retina    HERNIA REPAIR  1942, 1950    x2    [facility] [address] [facility]   [address]      [address] [facility] Restrictions/Precautions:  Restrictions/Precautions: General Precautions, Fall Risk                      Subjective:     Subjective: patient in wheelchair with tech in room prepping to return to bed on arrival, agrees to therapy, pleasantly confused throughout session    Pain:  Denies.           Social/Functional:  Type of Home: Assisted living (Pt unable to identify assisted living facility; per notes pt stays at [facility])  Home Layout: One level  Home Access: Level entry  Home Equipment: Rolling walker     Objective:  Supine to Sit: Stand by assistance (bed flat no rails)  Sit to Supine: Stand by assistance (bed flat no rails)    Transfers  Sit to Stand: Minimal Assistance  Stand to sit: Contact guard assistance       Ambulation 1  Surface: level tile  Device: Rolling Walker  Assistance: Contact guard assistance  Quality of Gait: decreased velocity and satnam, decreased step length B, forward flexed trunk, increased distance to walker  Distance: 80ft x2         Balance  Comments: standing at RW ring toss x2 required to reach outside RENE ~4inches to grasp in all directions with mild instability and no LOB                                        TCU Balance:      TCU BALANCE TEST: 0 1 2 8   Balance during to/from sit to stand   x    Balance during walking   x    Balance during turn-around and while walking   x    Balance moving on and off toilet    x   Balance during surface to/from surface transfers    x   0 = Steady at all times  1 = Not steady, but able to stabilize without human assistance  2 = Not steady, only able to stabilize with human assistance  8 = Activity did not occur          ELDERLY MOBILITY SCALE   LYING TO SITTING 2 - Independent   SITTING TO LYING 2 - Independent   SIT TO STAND 1 - Needs help of 1 person (verbal or physical)   STANDING 2 - Stands without support but needs help to reach   GAIT 1 - Mobile with walking aid but erratic/unsafe   TIMED WALK (6 meters) 2 - 16 - 30 seconds FUNCTIONAL REACH 0 - Under 10 cm       TOTAL SCORE 10/20     Scores under 10 - generally these patients are dependent for mobility and require help with basic ADL's. Scores between 10 and 13 - generally these patients are borderline in terms of safe mobility and ADL independence  Scores over 14 - generally these patients are able to perform mobility independently and safely and are independent in basic ADL's.    *Results are general interpretations that do not take into account cognition, safety awareness and other factors that may impact mobility. SCORE CONVERTER:  0 % Impaired   1-4 CM 80-99% Impaired   5-8 CL 60-79% Impaired   9-12 CK 40-59% Impaired   13-16 CJ 20-39% Impaired   17-19 CI 1-19% Impaired   20 CH 0% Impaired           Exercises:  Exercises  Comments: seated ankle pumps, long arc quads, hip ABD, and marches x12-15 reps each B with verbal and visual cues for performance and to stay on task          Activity Tolerance:  Activity Tolerance: Patient limited by fatigue  Activity Tolerance: Patient very sleepy and wanting to return to bed throughout session    Assessment: Body structures, Functions, Activity limitations: Decreased functional mobility , Decreased ADL status, Decreased safe awareness, Decreased endurance, Decreased balance, Decreased cognition  Assessment: The patient has currently met 4/5 short term goals. He has made progress in all mobility during his stay but continues to have decreased safety awareness and tolerance to activity. Getting out of bed has improved from Mod A at eval to SBA this date. Sit to supine has also improved from CGA to SBA during his stay. Transfers have remained at min A/CGA for safety. Ambulation has also improved from CGA/min A to CGA with use of RW. Score on the Elderly Mobility Scale has also improved from 6 to 10 during his stay, showing improved safety and decreased risk of falls.  He continues to require skilled PT to further improve safety awareness and stability. He will continue with the current POC and goals with emphasis on long term goals to maximize level of independence. Discharge Recommendations: 24 hour supervision or assist, Home with nursing aide, Home with Home health PT (Tenative plan is to return to [facility] with private caregivers coming in to assist pt.)    Patient Education:  Patient Education: amb., therex    Equipment Recommendations:  Equipment Needed: No  Other: continue to assess    Safety:  Type of devices: Call light within reach, Gait belt, All fall risk precautions in place, Patient at risk for falls, Bed alarm in place, Left in bed    Plan:  Times per week: 6x  Specific instructions for Next Treatment: bed mobility, transfers, safety awareness, gait training, functional tolerance to activity, standing balance  Current Treatment Recommendations: Strengthening, Balance Training, Functional Mobility Training, Transfer Training, Endurance Training, Gait Training, Stair training, Home Exercise Program, Safety Education & Training, Equipment Evaluation, Education, & procurement, Patient/Caregiver Education & Training, Wheelchair Mobility Training    Goals:  Patient goals : return to [facility].     Short term goals  Time Frame for Short term goals: 1 week  Short term goal 1: patient to perform supine to sit at min A to get out of bed - MET  Short term goal 2: patient to perform sit to supine at SBA to get in to bed - MET  Short term goal 3: patient to perform transfers from various surfaces at [facility] consistently to rise from bed or chair - NOT MET  Short term goal 4: patient to ambulate 80ft with RW at [facility] for functional mobility at assisted living - MET  Short term goal 5: patient score on elderly mobility scale will improve from 6 to 8 for improved safety and stability and decrease risk of falls - MET    Long term goals  Time Frame for Long term goals : 3 weeks  Long term goal 1: patient to perform supine to sit at SBA to get out of bed  Long term goal 2: patient to perform sit to supine at S to get in to bed  Long term goal 3: patient to perform transfers from various surfaces at SBA consistently to rise from bed or chair  Long term goal 4: patient to ambulate 150ft with RW at A for mobility within assisted living  Long term goal 5: patient score on elderly mobility scale will improve from 6 to 12 for improved safety and stability and decrease risk of falls

## 2017-10-12 NOTE — PROGRESS NOTES
[address] [place] [address]   SLP Individual Minutes  Time In: 7  Time Out: 0830  Minutes: 23  Timed Code Treatment Minutes: 0 Minutes       [x]Daily Note  []Progress Note  []Discharge Note    Date: 10/12/2017  Patient Name: Herberth Joyce        MRN: [id number]    : 1920  (80 y.o.)  Gender: male   Primary Provider: Roxanne Weaver MD  Admitting Diagnosis:  Pneumonia   Secondary Diagnosis: Dysphagia   Precautions: aspiration precautions   Swallowing Status/Diet: Dysphagia level 2 with nectar thick liquids - needs all food to be moist, chopped meats  Swallowing Strategies: slow rate; upright position; thickened liquids   DATE of last MBS:  BSE on 10/6/17    Subjective: Assisted patient in transferring to the chair for better positioning during meal. Per nurse, Cleveland Romo, patient did not sleep well through the night. Pt calling out \"hello\" frequently during session, despite SLP sitting directly next to him. Increased lethargy noted with need for feeding assist this session. SHORT TERM GOAL #1:  Goal 1: Pt will tolerate Dental soft and nectar diet without overt aspiration to ensure safe and adequate nutrition and hydration. INTERVENTIONS: Address goal via diet texture analysis during morning meal. Alertness was a barrier during meal, but patient initiated the meal with ability to self feed. As session progressed, SLP noted that patient would bring spoon down to scoop oatmeal, but would not retrieve any food and repeat the process 5-6 times with no awareness that the spoon was empty. Max feeding assist needed by the end of the meal, at which time SLP ended po intake due to safety concerns. No overt difficulty or s/s of aspiration noted with nectar thick liquids by cup. Pt demonstrated use of appropriate size bolus with adequate swallow initiation timing and fairly good hyolaryngeal elevation.  Pt did demonstrate throat clear x2 with oatmeal, likely due to some degree of pharyngeal residue. Encouraged patient to take a sip of liquid after every 2-3 bites for clearance. No difficulty with soft peaches (SLP did have to diced peaches into smaller chunks). Appropriate mastication noted with timely and complete oral clearance. Recommend continuation of dysphagia level 2 diet and nectar thick liquids. Continued skilled intervention needed to educate patient, family and staff on compensatory swallow strategies. SHORT TERM GOAL #2:  Goal 2: Monitor pulmonary status and complete further instrumental assessment if indicated. INTERVENTIONS:   Nurse Maeola Ormond, reports patient's lung sounds are diminished in lung bases but clear. Continue to monitor. SHORT TERM GOAL #3:  Goal 3: Complete full speech/language/cognitive assessment and establish POC    INTERVENTIONS: Pt appears to be close to baseline. Will continue to monitor and address as needed. Long-term Goals  Timeframe for Long-term Goals: 4 weeks  Goal 1: Pt will tolerate least restricitie diet without difficulty to ensure safe and adequate long term hydration and nutrition.       ASSESSMENT:  Assessment: [x]Progressing towards goals          []Not Progressing towards goals      Patient Tolerance of Treatment:   []Tolerated well [x]Tolerated fair []Required rest breaks [x]Fatigued  Plan for Next Session: Education on compensatory strategies during meal  Education:  Learner:  [x]Patient          []Significant Other          []Other  Education provided regarding:  [x]Goals and POC   [x]Diet and swallowing precautions    []Home Exercise Program  []Progress and/or discharge information  Method of Education:  [x]Discussion          []Demonstration          []Handout          []Other  Evaluation of Education:   []Verbalized understanding   []Demonstrates without assistance  []Demonstrates with assistance  []Needs further instruction     [x]No evidence of learning                  [x]No family

## 2017-10-12 NOTE — PROGRESS NOTES
Patient Name: Connor Saldana        MRN: [id number]    : 1920  (80 y.o.)  Gender: male   Principal Problem: <principal problem not specified>    Section D - Mood     Should Resident Mood Interview be Conducted?  Attempt to conduct interview with all residents   0. No (resident is rarely/never understood) à Skip to and complete -, Staff Assessment of Mood (PHQ 9-OV)  1. Yes à Continue to , Resident Mood Interview (PHQ-9) Enter Code    1   . Resident Mood Interview (PHQ-9)   Say to resident: Tc Phillips the last 2 weeks, have you been bothered by any of the following problems?    If symptom is present, enter 1(yes) in column 1, Symptom Presence. Then move to column 2, Symptom Frequency, and indicate symptom frequency. 1. Symptom Presence                   2. Symptom                                                                  Frequency  0. No (enter 0 in column 2)          0. Never or 1 day          1. Yes (enter 0-3 in column 2)      1. 2-6 days           9. No Response                               2.  7-11 days                                                 3.  12-14 days   1. Symptom Presence 2. Symptom  Frequency        Enter Scores in boxes below   A. Little interest or pleasure in doing things 0 0   B. Feeling down, depressed, or hopeless 0 0   C. Trouble falling or staying asleep, or sleeping too much 1 2   D. Feeling tired or having little energy 1 2   E. Poor appetite or overeating 0 0   F. Feeling bad about yourself  or that you are a failure, or have let your family down 0 0   G. Trouble concentrating on things, such as reading the newspaper or watching television 0 0   H. Moving or speaking so slowly that other people have noticed. Or the opposite-being so fidgety or restless that s/he has been moving around a lot more than usual   0   0   I. Thoughts that you would be better off dead, or of hurting yourself in some way.  0 0              Patient Name: Brigid Baker Jesús Graff        MRN: [id number]    : 1920  (80 y.o.)  Gender: male   Principal Problem: <principal problem not specified>    Section J    Health Conditions    Should Pain Assessment Interview be Conducted? Attempt to conduct interview with all residents. If resident is comatose, skip to , Shortness of Breath (dyspnea)   Enter Code  1 0 - No à (resident is rarely/never understood) à Skip to [address] of Breath  1 - Yes à Continue to , Pain Presence   Pain Assessment Interview   Pain Presence   Enter Code  1 Ask resident: Dulcy Sails you had pain or hurting at any time in the last 5 days?   0 - No à Skip to , Shortness of Breath  1 - Yes  à Continue to , Pain Frequency  9  Unable to answer à Skip to , Shortness of Breath    Pain Frequency   Enter Code          3 Ask resident: Cindy Beasleyalice much of the time have you experienced pain or hurting over the last 5 days?   1  Almost constantly  2  Frequently  3  Occasionally  4  Rarely  9  Unable to answer    Pain Effect on Function   Enter Code      0 Ask resident: Ross Nair the last 5 days, has pain made it hard for you to sleep at night?   0 - No   1 - Yes   9  Unable to answer    Enter Code      0 Ask resident: Ross Nair the last 5 days, have you limited your day-to-day activities because of pain?   0 - No   1 - Yes   9  Unable to answer     Pain Intensity       Enter Code      1 A. Verbal Descriptor Scale  Ask resident: Please rate the intensity of your worst pain over the last 5 days.  (Show resident verbal scale)  1  Mild  2  Moderate  3  Severe  4  Very severe, horrible  9  Unable to answer

## 2017-10-12 NOTE — PROGRESS NOTES
[facility]  INPATIENT OCCUPATIONAL THERAPY  [facility]  PROGRESS NOTE    Time:  Time In: 5181  Time Out: 1017  Timed Code Treatment Minutes: 39 Minutes  Minutes: 36          Date: 10/12/2017  Patient Name: Eddi Gagnon,   Gender: male      MRN: [id number]  : 1920  (80 y.o.)  Referring Practitioner: Dr. Landen Escobar  Diagnosis: pneumonia, SOB  Additional Pertinent Hx: 80 y.o. male with a past medical history of CAD, A-Fib, cancer, and chronic pain. He presents to the emergency department for evaluation of a fever and a cough. The patient is hard of hearing and does have an aid with him at bedside. He reports that he has been coughing up phlegm as well. He does have some back pain due to a fall that occurred two weeks ago. He has not developed any cuts or rashes from the fall but does appear to have multiple bruises. He does states that he has had hematochezia but this is not uncommon for him. The patient is on a blood thinner as well and is allergic to Penicillin. He denies any chest pain, shortness of breath, abdominal pain, urinary problems, headache, dizziness, lightheadedness, loss of consciousness, or nausea.       Restrictions/Precautions:  Restrictions/Precautions: General Precautions, Fall Risk              Past Medical History:   Diagnosis Date    Atrial fibrillation ([facility] [address] 2015    CAD (coronary artery disease)     Cancer (HCC)     skin- removed    Chronic kidney disease     Chronic pain     legs    Diabetes mellitus ([address]     [place] (hard of hearing)     Hypertension     Macular degeneration     MDRO (multiple drug resistant organisms) resistance     Medtronic loop recorder 2013    Unspecified cerebral artery occlusion with cerebral infarction     TIA x2     Past Surgical History:   Procedure Laterality Date    EYE SURGERY      laser detached retina    HERNIA REPAIR  1942, 1950    x2    [facility] [address] [facility] back   [address] [facility] 2010    melanoma    TURP  1990           Subjective    Subjective: Pt sitting in recliner upon arrival; max encouragement to get pt to participate in treatment  Comments: Pt in and out of sleep throughout session    Overall Orientation Status: Impaired    Pain:  Pain Assessment  Patient Currently in Pain: Denies       Objective    ADL  Grooming: Setup (Seated in recliner for brushing teeth)  Additional Comments: PT denied any other ADLs on this date          Transfers  Sit to stand: Moderate assistance  Stand to sit: Moderate assistance  Transfer Comments: Mod VCs for posture in prep for ambulation and to regain balance       Balance  Sitting Balance: Stand by assistance  Standing Balance: Moderate assistance (Mod A to regain balance in prep for ambulation)     Time: X1 min   Activity:  Prep for ambulation  Comment: To/from recliner  Functional - Mobility Device: Rolling Walker  Activity: Other (Around unit)  Assist Level: Contact guard assistance  Functional Mobility Comments: Pt completed functional mobility around unit with no LOB; min VCs for orientation     TCU BALANCE TEST: 0 1 2 8   Balance during to/from sit to stand   X    Balance during walking   X    Balance during turn-around and while walking   X    Balance moving on and off toilet    X   Balance during surface to/from surface transfers    X   0 = Steady at all times  1 = Not steady, but able to stabilize without human assistance  2 = Not steady, only able to stabilize with human assistance  8 = Activity did not occur         Type of ROM/Therapeutic Exercise: AROM  Comment: Pt completed BUE AROM with 1lb dumbbell seated in chair [id number] in all joints/planes. Pt required max VCs and visual cues for tech with brief PROM to correct movement.  Pt completed BUE to increase strength for functinal transfers            Activity Tolerance:  Activity Tolerance: Patient limited by fatigue  Activity Tolerance: Pt demos increased confusion and required max VCs to stay alert and follow through with session    Assessment:  Performance deficits / Impairments: Decreased functional mobility , Decreased ADL status, Decreased strength, Decreased safe awareness, Decreased balance, Decreased endurance  Assessment: Pt has demo'd limited progress since admission. Pt has met 1/4 STGs and 0/3 LTGs. Pt has shown improvements in functional mobility AEB ambulating with RW to/from BR with CGA and upon admission pt only ambulated from EOB to recliner with CGA. Pt continues to show decreased standing endurance and strength required for sinkside grooming and functional transfers. Pt has only completed one toilet transfer since upon admission. Pt continues to require increased VC for BUE tech and max VC for redirection throughout treatment. Pt will benefit from continued skilled OT treatment to increase independence and strength required for ADLs and safe transfers. Prognosis: Fair  Discharge Recommendations: 24 hour supervision or assist, Home with Home health OT    Patient Education:  Patient Education: OT role, walker safety and hand placements    Equipment Recommendations:  Equipment Needed: No    Safety:  Safety Devices in place: Yes  Type of devices:  All fall risk precautions in place, Call light within reach, Chair alarm in place, Gait belt, Left in chair    Plan:  Times per week: 6x  Current Treatment Recommendations: Strengthening, ROM, Balance Training, Functional Mobility Training, Endurance Training, Safety Education & Training, Patient/Caregiver Education & Training, Equipment Evaluation, Education, & procurement, Self-Care / ADL    Goals:        Short term goals  Time Frame for Short term goals: 1 week  Short term goal 1: Pt will complete various t/fs including toilet transfer with min A & min vcs for UE placement to increase safety during toileting routine (Not met)  Short term goal 2: Pt will complete 2-3 min standing ADL with CGA for increased ease of sinkside grooming (Not met)  Short term goal 3: Pt will complete functional mobility to/from bathroom with RW & CGA to to increase strength for ADLs (Met - revised)  Short term goal 4: Pt will complete BUE gentle AROM exercises and min VC's for tech to increase endurance for BADL (Not met)  Long term goals  Time Frame for Long term goals : 2 weeks  Long term goal 1: Pt will complete toilet transfer with CGA and <2 VC's for safety to increase endurance for toileting routine (Not met)  Long term goal 2: Pt will complete BUE AROM exercise with yellow tband and <3 rest breaks to increase strength required for self care tasks (Not met)   Long term goal 3: Pt will complete X4 min standing ADL with CGA to increase independence with ADL routine (Not met)     Revised Short term goals  Time Frame for Short term goals: 1 week  Short term goal 1:  Pt will complete various t/fs including toilet transfer with min A & min vcs for UE placement to increase safety during toileting routine   Short term goal 2: Pt will complete 2-3 min standing ADL with CGA for increased ease of sinkside grooming  Short term goal 3: Pt will complete functional mobility to/from bathroom with RW & SBA to to increase strength for ADLs  Short term goal 4: Pt will complete BUE gentle AROM exercises and min VC's for tech to increase endurance for BADL  Long term goals  Time Frame for Long term goals : 2 weeks  Long term goal 1: Pt will complete toilet transfer with CGA and <2 VC's for safety to increase endurance for toileting routine  Long term goal 2: Pt will complete BUE AROM exercise with yellow tband and <3 rest breaks to increase strength required for self care tasks   Long term goal 3: Pt will complete X4 min standing ADL with CGA to increase independence with ADL routine     This eval and note was completed in collaboration with Codie Middleton, OTR/L    Elyssa Mata S/OT

## 2017-10-12 NOTE — PROGRESS NOTES
RECREATIONAL THERAPY  MISSED TREATMENT    [x]Transitional Care Unit  []Inpatient Rehabilitation    Date:  10/12/2017            Patient Name: Jorge A Morrison           MRN: [id number]  Acct: [de-identified]          YOB: 1920 (80 y.o.)       Gender: male   Diagnosis: pneumonia, SOB  Physician: Referring Practitioner: Dr. Denny Pollard:    []Hold treatment per nursing request  []Patient refusal  []Family declined treatment  []Patient at testing and/or off the floor  []Patient unavailable, with PT/OT/nursing, etc.  [x]Other declined playing bingo with peers this afternoon   Kathleen Drummond, [address]    10/12/2017

## 2017-10-13 NOTE — PROGRESS NOTES
supervision/assist from staff (increased assist/supervision needed when patient is more lethargic and for tray set up). Education was reviewed with patient and nurse Michele France. Pt will require modified diet consistency and assistance/supervision with meals at discharge. SHORT TERM GOAL #2:  Goal 2: Monitor pulmonary status and complete further instrumental assessment if indicated. INTERVENTIONS:   Nurse Michele France, reports patient's lung sounds were clear this morning. Continue to monitor. SHORT TERM GOAL #3:  Goal 3: Complete full speech/language/cognitive assessment and establish POC     INTERVENTIONS: Pt appears to be close to baseline. Will continue to monitor and address as needed. Long-term Goals  Timeframe for Long-term Goals: 4 weeks  Goal 1: Pt will tolerate least restricitie diet without difficulty to ensure safe and adequate long term hydration and nutrition.       ASSESSMENT:  Assessment: [x]Progressing towards goals          []Not Progressing towards goals      Patient Tolerance of Treatment:   [x]Tolerated well []Tolerated fair []Required rest breaks []Fatigued  Plan for Next Session: Education on compensatory strategies during meal  Education:  Learner:  [x]Patient          []Significant Other          []Other  Education provided regarding:  [x]Goals and POC   [x]Diet and swallowing precautions    []Home Exercise Program  []Progress and/or discharge information  Method of Education:  [x]Discussion          []Demonstration          []Handout          []Other  Evaluation of Education:   [x]Verbalized understanding   []Demonstrates without assistance  []Demonstrates with assistance  []Needs further instruction     []No evidence of learning                  [x]No family present      Plan: [x]Continue with current plan of care    []Modify current plan of care as follows:    []Discharge patient    Discharge Location:    Services/Supervision Recommended:     [x]Patient continues to require treatment by a licensed therapist to address functional deficits as outlined in the established plan of care. Harjeet Martinez.  [address] [facility], [address], [address]

## 2017-10-13 NOTE — PROGRESS NOTES
[address] [address]  INPATIENT PHYSICAL THERAPY  DAILY NOTE  [facility]    Time In: 0652  Time Out: 3986  Timed Code Treatment Minutes: 45 Minutes  Minutes: 45          Date: 10/13/2017  Patient Name: Kendal Trinh,  Gender:  male        MRN: [id number]  : 1920  (80 y.o.)  Referral Date : 10/04/17  Referring Practitioner: Ordering: Gracie Del Rosario. Attending: NATALIE Avendaño MD  Diagnosis: SOB  Additional Pertinent Hx:  Kendal Trinh is a 80 y.o. male with a past medical history of CAD, A-Fib, cancer, and chronic pain. He presents to the emergency department for evaluation of a fever and a cough. The patient is hard of hearing and does have an aid with him at bedside. He reports that he has been coughing up phlegm as well. He does have some back pain due to a fall that occurred two weeks ago. He has not developed any cuts or rashes from the fall but does appear to have multiple bruises. He does states that he has had hematochezia but this is not uncommon for him. The patient is on a blood thinner as well and is allergic to Penicillin. He denies any chest pain, shortness of breath, abdominal pain, urinary problems, headache, dizziness, lightheadedness, loss of consciousness, or nausea.       Past Medical History:   Diagnosis Date    Atrial fibrillation ([address] 2015    CAD (coronary artery disease)     Cancer (HCC)     skin- removed    Chronic kidney disease     Chronic pain     legs    Diabetes mellitus ([address]     [place] (hard of hearing)     Hypertension     Macular degeneration     MDRO (multiple drug resistant organisms) resistance     Medtronic loop recorder 2013    Unspecified cerebral artery occlusion with cerebral infarction     TIA x2     Past Surgical History:   Procedure Laterality Date    EYE SURGERY      laser detached retina    HERNIA REPAIR  1942, 1950    x2    [facility] [address] removed from back   [address]      [address] [facility] toilet   x    Balance during surface to/from surface transfers   x    0 = Steady at all times  1 = Not steady, but able to stabilize without human assistance  2 = Not steady, only able to stabilize with human assistance  8 = Activity did not occur     Exercises:  Exercises  Comments: seated ankle pumps, long arc quads, hip ABD, and marches x12-15 reps each B and 12-15 reps each B LE supine ap's, quad/glute sets, saq, heelslides, hip abd/add  with verbal and visual cues for performance and to stay on task          Activity Tolerance:  Activity Tolerance: Patient limited by cognitive status  Activity Tolerance: pt. often stated \"can I go back to my room,I'm getting tired ? \"    Assessment:  Discharge Recommendations: 24 hour supervision or assist, Patient would benefit from continued therapy after discharge    Patient Education:  Patient Education: amb., therex    Equipment Recommendations:  Equipment Needed: No  Other: continue to assess    Safety:  Type of devices: All fall risk precautions in place, Call light within reach, Chair alarm in place, Telesitter in use, Gait belt, Left in chair    Plan:  Times per week: 6x  Specific instructions for Next Treatment: bed mobility, transfers, safety awareness, gait training, functional tolerance to activity, standing balance  Current Treatment Recommendations: Strengthening, Balance Training, Functional Mobility Training, Transfer Training, Endurance Training, Gait Training, Stair training, Home Exercise Program, Safety Education & Training, Equipment Evaluation, Education, & procurement, Patient/Caregiver Education & Training, Wheelchair Mobility Training    Goals:  Patient goals : return to [facility].     Short term goals  Time Frame for Short term goals: 1 week  Short term goal 1: patient to perform supine to sit at min A to get out of bed  Short term goal 2: patient to perform sit to supine at SBA to get in to bed  Short term goal 3: patient to perform transfers from various surfaces at [facility] consistently to rise from bed or chair  Short term goal 4: patient to ambulate 80ft with RW at [facility] for functional mobility at assisted living  Short term goal 5: patient score on elderly mobility scale will improve from 6 to 8 for improved safety and stability and decrease risk of falls    Long term goals  Time Frame for Long term goals : 3 weeks  Long term goal 1: patient to perform supine to sit at SBA to get out of bed  Long term goal 2: patient to perform sit to supine at S to get in to bed  Long term goal 3: patient to perform transfers from various surfaces at SBA consistently to rise from bed or chair  Long term goal 4: patient to ambulate 150ft with RW at A for mobility within assisted living  Long term goal 5: patient score on elderly mobility scale will improve from 6 to 12 for improved safety and stability and decrease risk of falls

## 2017-10-13 NOTE — PROGRESS NOTES
Functional Mobility  Functional - Mobility Device: Rolling Walker  Activity: Other  Assist Level: Contact guard assistance  Functional Mobility Comments: 1/2 lap around unit at fair pace. No LOB noted, slight unsteadiness noted at times.  Required seated rest break after completion of mobility     TCU BALANCE TEST: 0 1 2 8   Balance during to/from sit to stand   x    Balance during walking   x    Balance during turn-around and while walking   x    Balance moving on and off toilet    x   Balance during surface to/from surface transfers    x   0 = Steady at all times  1 = Not steady, but able to stabilize without human assistance  2 = Not steady, only able to stabilize with human assistance  8 = Activity did not occur     Activity Tolerance:  Activity Tolerance: Patient Tolerated treatment well    Assessment:  Performance deficits / Impairments: Decreased functional mobility , Decreased ADL status, Decreased strength, Decreased safe awareness, Decreased balance, Decreased endurance  Prognosis: Fair  Discharge Recommendations: 24 hour supervision or assist, Home with Home health OT    Patient Education:  Patient Education: safety with transfers and mobility, ADL strategies    Equipment Recommendations:  Equipment Needed: No    Safety:  Safety Devices in place: Yes  Type of devices: Call light within reach, Chair alarm in place, Left in chair, Gait belt    Plan:  Times per week: 6x  Current Treatment Recommendations: Strengthening, ROM, Balance Training, Functional Mobility Training, Endurance Training, Safety Education & Training, Patient/Caregiver Education & Training, Equipment Evaluation, Education, & procurement, Self-Care / ADL    Goals:  Short term goals  Time Frame for Short term goals: 1 week  Short term goal 1: Pt will complete various t/fs including toilet transfer with min A & min vcs for UE placement to increase safety during toileting routine  Short term goal 2: Pt will complete 2-3 min standing ADL with CGA for increased ease of sinkside grooming  Short term goal 3: Pt will complete functional mobility to/from bathroom with RW & SBA to to increase strength for ADLs  Short term goal 4: Pt will complete BUE gentle AROM exercises and min VC's for tech to increase endurance for BADL  Long term goals  Time Frame for Long term goals : 2 weeks  Long term goal 1: Pt will complete toilet transfer with CGA and <2 VC's for safety to increase endurance for toileting routine  Long term goal 2: Pt will complete BUE AROM exercise with yellow tband and <3 rest breaks to increase strength required for self care tasks  Long term goal 3: Pt will complete X4 min standing ADL with CGA to increase independence with ADL routine

## 2017-10-13 NOTE — PROGRESS NOTES
Pt called out his usual \"hello\" instead of using call light x 2. When staff entered the room he said he needed to \"pee\". Urinal was placed but pt was unable to urinate that time. There was a \"dribble\" amount both times. Told patient we would try again later.

## 2017-10-13 NOTE — PLAN OF CARE
Problem: Pain:  Goal: Control of chronic pain  Control of chronic pain   Outcome: Completed Date Met: 10/13/17  Denied pain or the need for pain medication. Problem: Bleeding:  Goal: Will show no signs and symptoms of excessive bleeding  Will show no signs and symptoms of excessive bleeding   Outcome: Met This Shift  No s/s of excessive bleeding or coagulation. Will continue to monitor. Problem: Activity:  Goal: Sleeping patterns will improve  Sleeping patterns will improve   Outcome: Ongoing  Night shift stated that patient was up all night and did not sleep. Pt was in chair with eyes closed for approximately an hour and half this morning. Problem: Anxiety:  Goal: Level of anxiety will decrease  Level of anxiety will decrease   Outcome: Ongoing  Pt has been resting in his chair after participating with therapy this a.m. Has called out a few times with his usual \"hello\" but does not appear anxious when staff enters room. Problem: Airway Clearance - Ineffective:  Goal: Clear lung sounds  Clear lung sounds   Outcome: Met This Shift  Lungs are clear. Goal: Ability to maintain a clear airway will improve  Ability to maintain a clear airway will improve   Outcome: Completed Date Met: 10/13/17      Problem: Serum Glucose Level - Abnormal:  Goal: Ability to maintain appropriate glucose levels will improve  Ability to maintain appropriate glucose levels will improve   Outcome: Completed Date Met: 10/13/17      Problem: Cardiovascular  Goal: Hemodynamic stability  Outcome: Ongoing  VS WNL. Pt does receive medications to aid in this. Problem: Nutrition  Goal: Optimal nutrition therapy  Outcome: Ongoing  Pt appears to have a good appetite, able to feed himself after set up. Problem: Skin Integrity:  Goal: Absence of new skin breakdown  Absence of new skin breakdown   Outcome: Ongoing  No new skin breakdown noted.      Problem: Discharge Planning:  Goal: Patients continuum of care needs are met  Patients continuum of care needs are met   Outcome: Ongoing  Pt's daily needs are met. Problem: Falls - Risk of  Goal: Absence of falls  Outcome: Met This Shift  Free from falls at this time. Fall band, sign and non-skid footwear is worn. Problem: Risk for Impaired Skin Integrity  Goal: Tissue integrity - skin and mucous membranes  Structural intactness and normal physiological function of skin and  mucous membranes. Outcome: Ongoing  Skin is clean, dry, intact. Scabbed abrasion on LLE. Mucus membranes are pink, moist and intact. Problem: DISCHARGE BARRIERS  Goal: Patient's continuum of care needs are met  Outcome: Ongoing  Pt's daily needs are met. Comments: Care plan reviewed with patient. Patient verbalizes understanding of the plan of care and contribute to goal setting.

## 2017-10-14 NOTE — PROGRESS NOTES
Pt. Continues to call out,\"hello\"  At all times. placed urinal, pt. Voided and requested a drink. Given. Pt still contniues to call out.

## 2017-10-14 NOTE — PLAN OF CARE
Problem: Bleeding:  Goal: Will show no signs and symptoms of excessive bleeding  Will show no signs and symptoms of excessive bleeding   Outcome: Met This Shift  Monitor for bleeding every shift    Problem: Activity:  Goal: Sleeping patterns will improve  Sleeping patterns will improve   Outcome: Met This Shift  Monitor for sleep every shift    Problem: Anxiety:  Goal: Level of anxiety will decrease  Level of anxiety will decrease   Outcome: Met This Shift  Monitor for anxiety every shift    Problem: Airway Clearance - Ineffective:  Goal: Clear lung sounds  Clear lung sounds   Outcome: Met This Shift  Monitor lungs sound every shift    Problem: Cardiovascular  Goal: Hemodynamic stability  Outcome: Met This Shift  Monitor vital signs every shift    Problem: Nutrition  Goal: Optimal nutrition therapy  Outcome: Met This Shift  Monitor intake every shift    Problem: Skin Integrity:  Goal: Absence of new skin breakdown  Absence of new skin breakdown   Outcome: Met This Shift  Monitor skin assessment every shift    Problem: Falls - Risk of  Goal: Absence of falls  Outcome: Met This Shift  Rounding every 2 hours    Problem: Risk for Impaired Skin Integrity  Goal: Tissue integrity - skin and mucous membranes  Structural intactness and normal physiological function of skin and  mucous membranes.    Outcome: Met This Shift  Skin assessment every shift    Problem: DISCHARGE BARRIERS  Goal: Patient's continuum of care needs are met  Outcome: Met This Shift  Team conference every tuesday    Comments: careplan

## 2017-10-14 NOTE — PROGRESS NOTES
(1.778 m)   Wt 156 lb (70.8 kg)   SpO2 96%   BMI 22.38 kg/m²   General appearance: alert, appears stated age and cooperative  Lungs: clear to auscultation bilaterally  Heart: regular rate and rhythm, S1, S2 normal, no murmur, click, rub or gallop  Abdomen: soft, non-tender; bowel sounds normal; no masses,  no organomegaly  Extremities: extremities normal, atraumatic, no cyanosis or edema  Skin: Skin color, texture, turgor normal. No rashes or lesions  Neurologic: Grossly normal    Data Review:   Results for Shelly Linares (MRN [id number]) as of 10/14/2017 14:13   Ref.  Range 9/17/2017 04:28 9/25/2017 15:21 9/25/2017 15:24 9/26/2017 06:03 9/30/2017 06:42 9/30/2017 12:10 9/30/2017 12:18 9/30/2017 16:58 9/30/2017 20:31 10/1/2017 11:06 10/1/2017 15:52 10/1/2017 20:55 10/2/2017 08:02 10/2/2017 11:24 10/2/2017 14:29 10/2/2017 16:17 10/2/2017 19:53 10/3/2017 11:43 10/3/2017 16:39 10/3/2017 20:00 10/4/2017 07:42 10/4/2017 11:17 10/4/2017 17:49 10/4/2017 20:41 10/5/2017 07:46 10/5/2017 08:10 10/6/2017 07:48 10/7/2017 07:26 10/8/2017 07:24 10/9/2017 07:42 10/10/2017 07:34 10/11/2017 08:13 10/12/2017 08:08 10/13/2017 08:06 10/14/2017 07:43   POC Glucose Latest Ref Range: 70 - 108 mg/dl      102  119 (H) 202 (H) 164 (H) 173 (H) 143 (H) 114 (H) 180 (H)  196 (H) 138 (H) 109 (H) 136 (H) 171 (H) 113 (H) 255 (H) 159 (H) 156 (H) 199 (H)  135 (H) 106 134 (H) 125 (H) 86 92 167 (H) 97 108       Electronically signed by Chris Solorio MD on 10/14/2017 at 2:18 PM

## 2017-10-14 NOTE — PLAN OF CARE
Problem: Impaired respiratory status  Goal: Clear lung sounds  Outcome: Ongoing  Patient has diminished breath sounds at this time. Continue treatment as ordered.

## 2017-10-15 NOTE — PROGRESS NOTES
[address] [address]  INPATIENT PHYSICAL THERAPY  DAILYNOTE  [facility]    Time In: 1200  Time Out: 1230  Timed Code Treatment Minutes: 30 Minutes  Minutes: 30          Date: 10/15/2017  Patient Name: Jorge A Morrison,  Gender:  male        MRN: [id number]  : 1920  (80 y.o.)  Referral Date : 10/04/17  Referring Practitioner: Ordering: Rubén Barlow. Attending: NATALIE Brown MD  Diagnosis: SOB  Additional Pertinent Hx:  Jorge A Morrison is a 80 y.o. male with a past medical history of CAD, A-Fib, cancer, and chronic pain. He presents to the emergency department for evaluation of a fever and a cough. The patient is hard of hearing and does have an aid with him at bedside. He reports that he has been coughing up phlegm as well. He does have some back pain due to a fall that occurred two weeks ago. He has not developed any cuts or rashes from the fall but does appear to have multiple bruises. He does states that he has had hematochezia but this is not uncommon for him. The patient is on a blood thinner as well and is allergic to Penicillin. He denies any chest pain, shortness of breath, abdominal pain, urinary problems, headache, dizziness, lightheadedness, loss of consciousness, or nausea.       Past Medical History:   Diagnosis Date    Atrial fibrillation ([address] 2015    CAD (coronary artery disease)     Cancer (HCC)     skin- removed    Chronic kidney disease     Chronic pain     legs    Diabetes mellitus ([address]     [place] (hard of hearing)     Hypertension     Macular degeneration     MDRO (multiple drug resistant organisms) resistance     Medtronic loop recorder 2013    Unspecified cerebral artery occlusion with cerebral infarction     TIA x2     Past Surgical History:   Procedure Laterality Date    EYE SURGERY      laser detached retina    HERNIA REPAIR  1942, 1950    x2    [facility] [address] back   [address]      [address] [facility] performance and participation. All exercises performed to increase strength and improve functional mobiltiy          Activity Tolerance:  Activity Tolerance: Patient limited by cognitive status    Assessment: Body structures, Functions, Activity limitations: Decreased functional mobility , Decreased ADL status, Decreased safe awareness, Decreased endurance, Decreased balance, Decreased cognition  Assessment: Pt. tolerated session fair. Pt. requested to return to bed after toileting. Pt. steady on feet this session with no LOB. Pt. would benefit from continued skilled PT to increase strength and balance to enhance functional mobility. Discharge Recommendations: 24 hour supervision or assist, Patient would benefit from continued therapy after discharge    Patient Education:  Patient Education: Ambulation, Ther ex, Transfers, POC    Equipment Recommendations:  Equipment Needed: No  Other: continue to assess    Safety:  Type of devices: All fall risk precautions in place, Call light within reach, Telesitter in use, Gait belt, Left in bed, Bed alarm in place    Plan:  Times per week: 6x  Specific instructions for Next Treatment: bed mobility, transfers, safety awareness, gait training, functional tolerance to activity, standing balance  Current Treatment Recommendations: Strengthening, Balance Training, Functional Mobility Training, Transfer Training, Endurance Training, Gait Training, Stair training, Home Exercise Program, Safety Education & Training, Equipment Evaluation, Education, & procurement, Patient/Caregiver Education & Training, Wheelchair Mobility Training    Goals:  Patient goals : return to [facility].     Short term goals  Time Frame for Short term goals: 1 week  Short term goal 1: patient to perform supine to sit at min A to get out of bed  Short term goal 2: patient to perform sit to supine at SBA to get in to bed  Short term goal 3: patient to perform transfers from various surfaces at [facility] consistently to

## 2017-10-15 NOTE — PLAN OF CARE
Problem: Impaired respiratory status  Goal: Clear lung sounds  Outcome: Ongoing  Pt has clear but diminished breath sounds, continue treatment as ordered

## 2017-10-15 NOTE — PROGRESS NOTES
EXCISION  2010    melanoma    TURP  1990         Subjective     Subjective: Pt supine in bed upon arrival. Upon arrival pt had front of depends open and motioning for urinal.      Pain:  Pain Assessment  Patient Currently in Pain: Denies     Objective      ADL  LE Dressing: Moderate assistance (Pt seated at edge of bed able to cross LLE over Right Knee required assitance with donning socks.)  Toileting: Stand by assistance (supine in bed with head of bed elevated. Pt utilized urinal )     Bed mobility  Supine to Sit: Minimal assistance (verbal cues for hand placement)  Sit to supine: CGA into bed. Max A x 2 scooting pt to head of bed      Sit to stand: Min A at edge of bed. Pt demonstrated posterior lean with 1x demonstration of Posterior LOB with Min A from therapist to correct.   Stand to sit: CGA  Pt given verbal cues for standing posture    TCU BALANCE TEST: 0 1 2 8   Balance during to/from sit to stand   x    Balance during walking       Balance during turn-around and while walking       Balance moving on and off toilet       Balance during surface to/from surface transfers       0 = Steady at all times  1 = Not steady, but able to stabilize without human assistance  2 = Not steady, only able to stabilize with human assistance  8 = Activity did not occur       Activity Tolerance:   Pt tolerated treatment well      Assessment:     Performance deficits / Impairments: Decreased functional mobility , Decreased ADL status, Decreased strength, Decreased safe awareness, Decreased balance, Decreased endurance  Prognosis: Fair  Discharge Recommendations: 24 hour supervision or assist, Home with Home health OT    Patient Education:  Patient Education: safety with transfers, ADL's, posture    Equipment Recommendations:  Equipment Needed: No    Safety:  Safety Devices in place: Yes  Type of devices: Call light within reach, Chair alarm in place, Left in chair, Gait belt    Plan:  Times per week: 6x  Current Treatment Recommendations: Strengthening, ROM, Balance Training, Functional Mobility Training, Endurance Training, Safety Education & Training, Patient/Caregiver Education & Training, Equipment Evaluation, Education, & procurement, Self-Care / ADL    Goals:       Short term goals  Time Frame for Short term goals: 1 week  Short term goal 1: Pt will complete various t/fs including toilet transfer with min A & min vcs for UE placement to increase safety during toileting routine  Short term goal 2: Pt will complete 2-3 min standing ADL with CGA for increased ease of sinkside grooming  Short term goal 3: Pt will complete functional mobility to/from bathroom with RW & SBA to to increase strength for ADLs  Short term goal 4: Pt will complete BUE gentle AROM exercises and min VC's for tech to increase endurance for BADL  Long term goals  Time Frame for Long term goals : 2 weeks  Long term goal 1: Pt will complete toilet transfer with CGA and <2 VC's for safety to increase endurance for toileting routine  Long term goal 2: Pt will complete BUE AROM exercise with yellow tband and <3 rest breaks to increase strength required for self care tasks  Long term goal 3: Pt will complete X4 min standing ADL with CGA to increase independence with ADL routine

## 2017-10-15 NOTE — PLAN OF CARE
Problem: Bleeding:  Goal: Will show no signs and symptoms of excessive bleeding  Will show no signs and symptoms of excessive bleeding   Outcome: Met This Shift  Monitor for bleeding every shift    Problem: Activity:  Goal: Sleeping patterns will improve  Sleeping patterns will improve   Outcome: Met This Shift  Monitor for sleep every shift    Problem: Anxiety:  Goal: Level of anxiety will decrease  Level of anxiety will decrease   Outcome: Met This Shift  Monitor anxiety every shift    Problem: Airway Clearance - Ineffective:  Goal: Clear lung sounds  Clear lung sounds   Outcome: Met This Shift  Monitor lung sounds every shift    Problem: Cardiovascular  Goal: Hemodynamic stability  Outcome: Met This Shift  Monitor vital signs every shift    Problem: Nutrition  Goal: Optimal nutrition therapy  Outcome: Met This Shift  Monitor intake every shift    Problem: Skin Integrity:  Goal: Absence of new skin breakdown  Absence of new skin breakdown   Outcome: Met This Shift  Skin assessment every shift    Problem: Discharge Planning:  Goal: Patients continuum of care needs are met  Patients continuum of care needs are met   Outcome: Met This Shift  Team conference every tuesday    Problem: Falls - Risk of  Goal: Absence of falls  Outcome: Met This Shift  Rounding every 2 hours    Problem: Risk for Impaired Skin Integrity  Goal: Tissue integrity - skin and mucous membranes  Structural intactness and normal physiological function of skin and  mucous membranes.    Outcome: Met This Shift  Skin assessment every shift    Problem: DISCHARGE BARRIERS  Goal: Patient's continuum of care needs are met  Outcome: Met This Shift  Team conference every tuesday    Comments: careplan

## 2017-10-16 NOTE — PROGRESS NOTES
Mary Anndericnacho [address] 60  INPATIENT OCCUPATIONAL THERAPY  [facility] TCU 8E  DISCHARGE NOTE    Time:  Time In: 935  Time Out: 1009  Timed Code Treatment Minutes: 29 Minutes  Minutes: 34       Date: 10/16/2017  Patient Name: Eddi Gagnon,   Gender: male      MRN: [id number]  : 1920  (80 y.o.)  Referring Practitioner: Dr. Landen Escobar  Diagnosis: pneumonia, SOB  Additional Pertinent Hx: 80 y.o. male with a past medical history of CAD, A-Fib, cancer, and chronic pain. He presents to the emergency department for evaluation of a fever and a cough. The patient is hard of hearing and does have an aid with him at bedside. He reports that he has been coughing up phlegm as well. He does have some back pain due to a fall that occurred two weeks ago. He has not developed any cuts or rashes from the fall but does appear to have multiple bruises. He does states that he has had hematochezia but this is not uncommon for him. The patient is on a blood thinner as well and is allergic to Penicillin. He denies any chest pain, shortness of breath, abdominal pain, urinary problems, headache, dizziness, lightheadedness, loss of consciousness, or nausea.       Restrictions/Precautions:  Restrictions/Precautions: General Precautions, Fall Risk    Past Medical History:   Diagnosis Date    Atrial fibrillation ([address] 2015    CAD (coronary artery disease)     Cancer (HCC)     skin- removed    Chronic kidney disease     Chronic pain     legs    Diabetes mellitus ([address]     [place] (hard of hearing)     Hypertension     Macular degeneration     MDRO (multiple drug resistant organisms) resistance     [employer]tronic loop recorder 2013    Unspecified cerebral artery occlusion with cerebral infarction     TIA x2     Past Surgical History:   Procedure Laterality Date    EYE SURGERY      laser detached retina    HERNIA REPAIR  1942, 1950    x2    [facility] removed from back   [facility]   melanoma    TURP  1990           Subjective    Subjective: Pt upright in chair upon arrival. Agreeable to therapy on this date. Overall Orientation Status: Impaired    Pain:  Pain Assessment  Patient Currently in Pain: Denies       Objective  Overall Cognitive Status: Exceptions      ADL  Grooming: Contact guard assistance (Brushing teeth standing sinkside)  UE Bathing: Supervision  LE Bathing: Contact guard assistance;Verbal cueing (Increased verbal cueing for redirection)  UE Dressing: Minimal assistance;Verbal cueing  LE Dressing: Moderate assistance (To don/doff undergarment and to don pants)          Transfers  Sit to stand: Minimal assistance  Stand to sit: Minimal assistance  Transfer Comments: To/from recliner; mod VCs for safety       Balance  Sitting Balance: Stand by assistance  Standing Balance: Contact guard assistance     Time: X2 min   Activity: LB dressing   Comment: To/from recliner  Functional - Mobility Device: Rolling Walker  Activity: To/from bathroom  Assist Level: Contact guard assistance  Functional Mobility Comments: Mod VC for walker safety     TCU BALANCE TEST: 0 1 2 8   Balance during to/from sit to stand   X    Balance during walking   X    Balance during turn-around and while walking   X    Balance moving on and off toilet    X   Balance during surface to/from surface transfers    X   0 = Steady at all times  1 = Not steady, but able to stabilize without human assistance  2 = Not steady, only able to stabilize with human assistance  8 = Activity did not occur     Type of ROM/Therapeutic Exercise: AROM  Comment: Pt completed BUE AROM with 1lb dumbbell seated in chair X10 reps in all joint/planes. Pt required max VCs for tech and redirection.  Pt complete BUE AROM to increase strength for functional transfers      Activity Tolerance:  Activity Tolerance: Patient Tolerated treatment well    Assessment:  Performance deficits / Impairments: Decreased functional mobility , Decreased ADL status, Decreased strength, Decreased safe awareness, Decreased balance, Decreased endurance  Assessment: Pt has made limited progress since admission. Pt has met 1/4 goals and 0/3 goals since admission. Pt has shown improvement in standing ADL tasks by requiring CGA during dynamic standing on this date and on admission required CGA for completion of static standing only. Pt demo's decreased independence in functional transfers including toilet transfers AEB requiring min A on this date from sit to stand from bedside chair. Pt also demo's decreased cognition requiring max VC's for redirection and safety during BUE exercises and functional mobility. Pt will benefit from skilled [facility] OT at AL to increase strength and endurance required for self care tasks. Pt will also benefit from 24 hour supervision or assist due to decreased safety awareness and generalized weakness. Prognosis: Fair  Discharge Recommendations: 24 hour supervision or assist, Home with Home health OT    Patient Education:  Patient Education: Safety with trenasfers; hand placements    Equipment Recommendations:  Equipment Needed: No    Safety:  Safety Devices in place: Yes  Type of devices:  All fall risk precautions in place, Call light within reach, Chair alarm in place, Gait belt, Left in chair    Plan:  Home with  OT    Goals:  Short term goals  Time Frame for Short term goals: 1 week  Short term goal 1: Pt will complete various t/fs including toilet transfer with min A & min vcs for UE placement to increase safety during toileting routine (Not met)  Short term goal 2: Pt will complete 2-3 min standing ADL with CGA for increased ease of sinkside grooming (Met)  Short term goal 3: Pt will complete functional mobility to/from bathroom with RW & SBA to to increase strength for ADLs (Not met)  Short term goal 4: Pt will complete BUE gentle AROM exercises and min VC's for tech to increase endurance for BADL (Not met)  Long term goals  Time Frame for Long term goals : 2 weeks  Long term goal 1: Pt will complete toilet transfer with CGA and <2 VC's for safety to increase endurance for toileting routine (Not met)  Long term goal 2: Pt will complete BUE AROM exercise with yellow tband and <3 rest breaks to increase strength required for self care tasks (Partially met)  Long term goal 3: Pt will complete X4 min standing ADL with CGA to increase independence with ADL routine (Not met)       This eval and note was completed in collaboration with Jeff Parra OTR/ANA Mosquera S/OT

## 2017-10-16 NOTE — PLAN OF CARE
Problem: DISCHARGE BARRIERS  Goal: Patient's continuum of care needs are met    Intervention: INVOLVE PATIENT/S.O. IN DISCHARGE PLANNING PROCESS  Plans are progressing to discharge patient back to the [address] [place] [address] assisted living facility, tomorrow, 10/17. His son/CASSY Burgess, visited with patient on 10/14, and signed the [facility] letter at that time. The [facility] was contacted and they expect to be able to provide transportation to the facility tomorrow. Home health services through [facility] the [address] [place] [address] will be arranged once patient has returned home. Will continue to follow and assist with discharge plans.  PETEY Barcenas

## 2017-10-16 NOTE — PATIENT CARE CONFERENCE
assistance (Brushing teeth standing sinkside)  UE Bathing: Supervision  LE Bathing: Contact guard assistance, Verbal cueing (Increased verbal cueing for redirection)  UE Dressing: Minimal assistance, Verbal cueing  LE Dressing: Moderate assistance (To don/doff undergarment and to don pants)  Toileting: Stand by assistance (supine in bed with head of bed elevated.)  Additional Comments: PT denied any other ADLs on this date       Speech Therapy:       Nutrition:    Weight: 155 lb (70.3 kg) / Body mass index is 22.24 kg/m². Please see nutrition note for details. Family Education: No family available for education  Fall Risk:  Falling star program initiated    Goal Achievement:   Patient is meeting goals    Discharge Plan   Estimated Length of Stay: today  Destination: assisted living  Services at Discharge: [address] [place] [address], Occupational Therapy and Speech Therapy daily  Equipment at Discharge: No equipment needs  Factors facilitating achievement of predicted outcomes: Motivated, Cooperative and Sense of humor  Barriers to the achievement of predicted outcomes: Cognitive deficit and Decreased endurance    Readmission Risk              Readmission Risk:        21.5       Age 72 or Greater:  1    Admitted from SNF or Requires Paid or Family Care:  2    Currently has CHF,COPD,ARF,CRI,or is on dialysis:  0    Takes more than 5 Prescription Medications:  4    Takes Digoxin,Insulin,Anticoagulants,Narcotics or ASA/Plavix:  [address] in Past 12 Months:  10    On Disability:  0    Patient Considers own Health:  2.5        High (20-31)         Team Members Present at Conference:  Physician: Dr. Bj Escalera MD  Nurse: Terrie Clark LPN  :  PETEY Eldridge  Occupational Therapist:  BOB Brewer  Physical Therapist:   Ganesh Gordon PT  Speech Therapist: Speech Therapist: N/A.     All team members have reviewed and updated as necessary and appropriate the established interdisciplinary plan of care within the medical record of Mary Altamirano. Pt's team conference attended (see above). Pt seen on rounds with LSW, to review the results with patient and family. Discussed length of stay as above. Pt's team conference attended (see above.)  Pt seen on rounds with LSW, to review the results with patient and family. Discussed length of stay as above.     Physical Exam:  General:  Alert and oriented  Vitals:   Vitals:    10/17/17 0742   BP: (!) 158/73   Pulse: 68   Resp: 16   Temp: 96.9 °F (36.1 °C)   SpO2: 98%     Heart:  Regular rate and rhythm  Lungs:  Clear to auscultation  Abdomen:  soft with positive bowel sounds     Assessment:  Progressing in full rehabilitation program (see above)    Plan:  Continue Rehab            Continue current medications            Spent 35 minutes today in patient management and care    Electronically signed by Julianna Wellington MD on 10/17/2017 at 8:08 AM  .

## 2017-10-16 NOTE — PROGRESS NOTES
are dependent for mobility and require help with basic ADL's. Scores between 10 and 13 - generally these patients are borderline in terms of safe mobility and ADL independence  Scores over 14 - generally these patients are able to perform mobility independently and safely and are independent in basic ADL's.    *Results are general interpretations that do not take into account cognition, safety awareness and other factors that may impact mobility. SCORE CONVERTER:  0 % Impaired   1-4 CM 80-99% Impaired   5-8 CL 60-79% Impaired   9-12 CK 40-59% Impaired   13-16 CJ 20-39% Impaired   17-19 CI 1-19% Impaired   20 CH 0% Impaired           Exercises:  Exercises  Comments: none          Activity Tolerance:  Activity Tolerance: Patient limited by fatigue  Activity Tolerance: decreased motivation    Assessment: Body structures, Functions, Activity limitations: Decreased functional mobility , Decreased ADL status, Decreased safe awareness, Decreased endurance, Decreased balance, Decreased cognition  Assessment: patient tolerated session fair this date, not motivated for therapy at this time   Discharge Recommendations: 24 hour supervision or assist, Patient would benefit from continued therapy after discharge    The patient has currently met 4/5 short term and 1/5 long term goals. The patient has improved getting out of bed from mod A at eval to SBA currently. Lying down in bed has also improved from CGA to SBA during his stay. Transfers have improved from min/mod A to consistently min A secondary to posterior trunk lean. Gait has slightly improved from CGA/min A to CGA during his stay with continued use of RW for safety. Score on the Elderly Mobility Scale also improved from 6 to 10 during his stay, showing improved mobility and stability and decreased risk of falls.  He will be discharged tomorrow, 10/17 and will be returning to [facility] with home health PT, OT, ST and aide services to ensure safety and improve

## 2017-10-16 NOTE — PROGRESS NOTES
[address] [address]  INPATIENT SPEECH THERAPY  [facility] TCU 8E    TIME   SLP Individual Minutes  Time In: 9371  Time Out: 2157  Minutes: 30          []Daily Note  []Progress Note  [x]Discharge Note    Date: 10/16/2017  Patient Name: Elizabeth Blanco        MRN: [id number]    : 1920  (80 y.o.)  Gender: male   Primary Provider: Evette Metcalf MD  Admitting Diagnosis:  Pneumonia   Secondary Diagnosis: Dysphagia   Precautions: aspiration precautions   Swallowing Status/Diet: Dysphagia level 2 with nectar thick liquids - needs all food to be moist, chopped meats  Swallowing Strategies: slow rate; upright position; thickened liquids   DATE of last MBS:  BSE on 10/6/17    Subjective: Pt awake and alert with no complaint of pain or discomfort. Seated at 90 degrees with feet on floor in recliner consuming breakfast upon arrival; adequate postural positioning for intake. Cooperative with POC. SHORT TERM GOAL #1:  Goal 1: Pt will tolerate Dental soft and nectar diet without overt aspiration to ensure safe and adequate nutrition and hydration. GOAL NOT MET, CONTINUE  INTERVENTIONS: Therapeutic diet texture analysis of current diet facilitated at breakfast.  Pt unable to recall previously trained compensatory swallowing strategies; referred pt to environmental visual aid and re-instructed pt on strategies of small bites and alternating solids/liquids; verbal cues x3 required for bolus modification for appropriate size of bite. Immature mastication as evidenced through combination of munching and rotary chewing patterns; deficits related to cohesive bolus formation, resulting in min oral stasis; residues cleared upon liquid wash. Nectar viscosity intake via cup with no overt s/s aspiration x8 oz thickened milk, x4 ox thickened juice; silent aspiration unable to be r/o. With SLP present, pt consumed 75% presented meal with further intake to be charted by nursing; audible indigestion x9.  Consultation with ROMAINE Carpenter re: request for additional assistance/supervision for PO intake, as pt with decreased manipulation of utensils at date, impacting self-feeding; pt also presented with whole pear pieces not sliced into bite size portions, in which pt made no initiation for cutting of fruit; assistance to be provided to ensure recommendations are with carryover. *Following tx, clinician documenting at nurses station when overt productive cough heard from pt's room; pt unable to tell SLP what he was coughing on despite max prompting provided. SHORT TERM GOAL #2:  Goal 2: Monitor pulmonary status and complete further instrumental assessment if indicated. GOAL NOT CONSISTENTLY MET, CONTINUE  INTERVENTIONS:   ROMAINE Silva, reports patient's lung sounds were clear this morning with no compromise noted. Continue to monitor. SHORT TERM GOAL #3:  Goal 3: Complete full speech/language/cognitive assessment and establish POC  CONTINUE TO MONITOR  INTERVENTIONS: Pt appears to be close to baseline. Will continue to monitor and address as needed. Long-term Goals  Timeframe for Long-term Goals: 4 weeks  Goal 1: Pt will tolerate least restricitie diet without difficulty to ensure safe and adequate long term hydration and nutrition. SUMMARY:  For this therapy reporting period, 0/2 STGs achieved, however, pt with consistent progress for improved safety of swallow function. Skilled diet texture analysis' reveal pt with improved safety for PO intake given compensatory swallowing strategies of small bites and alternating solids/liquids and meal set-up through direct assistance/supervision. Adequate nutrition/hydration measures maintained with current established diet to negate potential weight loss. Further progress foreseen with continued ST services with no potential barriers. ST recommends modified diet consistency and assistance/supervision with meals at discharge.  Pt to discharge to [facility]; recommend continued ST services to

## 2017-10-17 NOTE — DISCHARGE SUMMARY
TCU  Discharge Summary     Patient Identification:  Hilda Gilbert  : 1920  Admit date: 10/4/2017  Discharge date: 10/17/17   Attending provider: Jimmy Corley MD        Primary care provider: Cheikh Grissom MD     Discharge Diagnoses: Active Hospital Problems    Diagnosis Date Noted    Cerebrovascular disease [I67.9] 2012     Priority: High    Anxiety [F41.9] 10/10/2017    Delirium due to general medical condition [F05] 10/05/2017    Debility [R53.81]     Physical deconditioning [R53.81] 2017    Chronic atrial fibrillation ([address] [I48.2] 2015    Type 2 diabetes mellitus without complication, without long-term current use of insulin ([address] [E11.9]     Orthostatic hypotension [I95.1] 2013    Essential hypertension [I10] 2012       TCU Course:   Hilda Gilbert is a 80 y.o. male admitted to the transitional care unit on 10/4/2017 for continuation of therapies from the acute hospital stay. His glucose readings were followed and he gained some strength but not enough to live independently so will need to return to Assisted Living     Consults:   none    Significant Diagnostics:   Results for Angi Pearson (MRN [id number]) as of 10/17/2017 10:13   Ref. Range 10/5/2017 08:10   Hemoglobin A1C Latest Ref Range: 4.4 - 6.4 % 5.4       Recent Labs      10/15/17   0804  10/16/17   0741   POCGLU  84  117*       Patient Instructions:       Follow-up visits: See after visit summary from hospitalization    Discharge Medications:   Cipriano Garner   Home Medication Instructions LXI:[id number]    Printed on:10/17/17 1011   Medication Information                      acetaminophen (TYLENOL) 325 MG tablet  Take 650 mg by mouth every 6 hours as needed for Pain             Acetaminophen 500 MG CAPS  Take 1 tablet by mouth nightly             albuterol sulfate HFA (PROAIR HFA) 108 (90 BASE) MCG/ACT inhaler  Inhale 2 puffs into the lungs every 6 hours as needed for Wheezing or Shortness of Breath             aspirin 81 MG EC tablet  Take 1 tablet by mouth daily. atenolol (TENORMIN) 50 MG tablet  Take 1 tablet by mouth daily             busPIRone (BUSPAR) 10 MG tablet  Take 10 mg by mouth 2 times daily             docusate (COLACE, DULCOLAX) 100 MG CAPS  Take 100 mg by mouth 2 times daily             finasteride (PROSCAR) 5 MG tablet  Take 5 mg by mouth daily.                lidocaine (LIDODERM) 5 %  Place 1 patch onto the skin daily 12 hours on, 12 hours off.             magnesium hydroxide (MILK OF MAGNESIA) 400 MG/5ML suspension  Take 30 mLs by mouth daily as needed for Constipation             melatonin 3 MG TABS tablet  Take 9 mg by mouth nightly             midodrine (PROAMATINE) 5 MG tablet  Take 1 tablet by mouth 3 times daily (with meals)             mirtazapine (REMERON) 15 MG tablet  Take 15 mg by mouth nightly             Multiple Vitamins-Minerals (THERAPEUTIC MULTIVITAMIN-MINERALS) tablet  Take 1 tablet by mouth daily             polyethylene glycol (GLYCOLAX) powder  Take 17 g by mouth daily as needed Add to 4-8 oz of water             potassium chloride (KLOR-CON M) 10 MEQ extended release tablet  Take 10 mEq by mouth daily                 35 minutes spent preparing the patient for discharge    Norman Blanton MD

## 2017-10-17 NOTE — PLAN OF CARE
Problem: Impaired respiratory status  Goal: Clear lung sounds  Outcome: Met This Shift  Lung sounds are clear after tx. acapella and IS encouraged after tx.

## 2017-11-21 NOTE — ED PROVIDER NOTES
[facility] EMERGENCY DEPT      CHIEF COMPLAINT       Chief Complaint   Patient presents with    Altered Mental Status       Nurses Notes reviewed and I agree except as noted in the HPI. HISTORY OF PRESENT ILLNESS    Georgia Gilford is a 80 y.o. 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 Nursing home staff states that these repetitively yelling hello, and is not easily following commands. Patient's family, however, states that this is patient's baseline. Diane Chi that he has been gradually declining, and that he seems that his dementia is getting worse. Onset: acute on chronic  Duration: hrs  Timing: constant  Location of Pain:   Intesity/severity: mild  Modifying Factors: Unknown  Relieved by;  Previous Episodes; Tx Before arrival: none  REVIEW OF SYSTEMS      Review of Systems   Constitutional.  patient confused at baseline, history of dementia, unable to add to review of systems/HPI. PAST MEDICAL HISTORY    has a past medical history of Atrial fibrillation ([address] CAD (coronary artery disease); Cancer ([address] Chronic kidney disease; Chronic pain; Diabetes mellitus ([address] [place] (hard of hearing); Hypertension; Macular degeneration; MDRO (multiple drug resistant organisms) resistance; [employer] loop recorder; and Unspecified cerebral artery occlusion with cerebral infarction. SURGICAL HISTORY      has a past surgical history that includes TURP (1990); hernia repair (1942, 1950); eye surgery; skin biopsy (1979); and Skin cancer excision (2010).     CURRENT MEDICATIONS       Discharge Medication List as of 11/21/2017  8:35 PM      CONTINUE these medications which have NOT CHANGED    Details   atenolol (TENORMIN) 50 MG tablet Take 1 tablet by mouth daily, Disp-30 tablet, R-0Print      lidocaine (LIDODERM) 5 % Place 1 patch onto the skin daily 12 hours on, 12 hours off., Disp-30 patch, R-0Print      docusate (COLACE, DULCOLAX) 100 MG CAPS Take 100 mg by mouth 2 times dailyOTC      midodrine (PROAMATINE) 5 MG tablet Take 1 tablet by mouth 3 times daily (with meals), Disp-90 tablet, R-3Print      melatonin 3 MG TABS tablet Take 9 mg by mouth nightlyHistorical Med      acetaminophen (TYLENOL) 325 MG tablet Take 650 mg by mouth every 6 hours as needed for PainHistorical Med      polyethylene glycol (GLYCOLAX) powder Take 17 g by mouth daily as needed Add to 4-8 oz of waterHistorical Med      mirtazapine (REMERON) 15 MG tablet Take 15 mg by mouth nightlyHistorical Med      potassium chloride (KLOR-CON M) 10 MEQ extended release tablet Take 10 mEq by mouth dailyHistorical Med      busPIRone (BUSPAR) 10 MG tablet Take 10 mg by mouth 2 times dailyHistorical Med      magnesium hydroxide (MILK OF MAGNESIA) 400 MG/5ML suspension Take 30 mLs by mouth daily as needed for Constipation      albuterol sulfate HFA (PROAIR HFA) 108 (90 BASE) MCG/ACT inhaler Inhale 2 puffs into the lungs every 6 hours as needed for Wheezing or Shortness of Breath, Disp-1 Inhaler, R-1      Multiple Vitamins-Minerals (THERAPEUTIC MULTIVITAMIN-MINERALS) tablet Take 1 tablet by mouth daily      Acetaminophen 500 MG CAPS Take 1 tablet by mouth nightly      aspirin 81 MG EC tablet Take 1 tablet by mouth daily. , Disp-30 tablet, R-0      finasteride (PROSCAR) 5 MG tablet Take 5 mg by mouth daily. ALLERGIES     is allergic to pcn [penicillins]. FAMILY HISTORY     indicated that his mother is . He indicated that his father is . He indicated that one of his two sisters is . family history includes Arthritis in his sister and sister; COPD in his sister; Heart Disease in his sister. SOCIAL HISTORY      reports that he quit smoking about 64 years ago. His smoking use included Cigarettes. He quit after 10.00 years of use. He has never used smokeless tobacco. He reports that he does not drink alcohol or use drugs.     PHYSICAL EXAM     INITIAL VITALS:  oral temperature is 98.7 °F (37.1 °C). His blood pressure is 155/72 (abnormal) and his pulse is 66. His respiration is 16 and oxygen saturation is 97%. Physical Exam   Constitutional:  well-developed and well-nourished. HENT: Head: Normocephalic, atraumatic, Bilateral external ears normal, Oropharynx mosit, No oral exudates, Nose normal.   Eyes: PERRL, EOMI, Conjunctiva normal, No discharge. No scleral icterus  Neck: Normal range of motion, No tenderness, Supple  Lympatics: No lymphadenopathy. Cardiovascular: Normal rate, regular rhythm, S1 normal and S2 normal.  Exam reveals no gallop. Pulmonary/Chest: Effort normal and breath sounds normal. No accessory muscle usage or stridor. No respiratory distress. no wheezes. has no rales. exhibits no tenderness. Abdominal: Soft. Bowel sounds are normal.  exhibits no distension. There is no tenderness. There is no rebound and no guarding. Extremities: No edema, no tenderness, no cyanosis, no clubbing. Neurological: Alert and oriented ×0, normal motor function, normal sensory function, no focal deficits. GCS 15  Skin: Skin is warm, dry and intact. No rash noted. No erythema. DIFFERENTIAL DIAGNOSIS:       DIAGNOSTIC RESULTS     EKG: All EKG's are interpreted by the Emergency Department Physician who either signs or Co-signs this chart in the absence of a cardiologist.      RADIOLOGY: non-plain film images(s) such as CT, Ultrasound and MRI are read by the radiologist.  Plain radiographic images are visualized and preliminarily interpreted by the emergency physician unless otherwise stated below.       LABS:   Labs Reviewed   CBC WITH AUTO DIFFERENTIAL - Abnormal; Notable for the following:        Result Value    RBC 4.13 (*)     Hemoglobin 13.5 (*)     Hematocrit 40.2 (*)     MCV 97.3 (*)     MCH 32.7 (*)     Lymphocytes # 0.9 (*)     Basophils # 0.2 (*)     All other components within normal limits   BASIC METABOLIC PANEL - Abnormal; Notable for the following:     BUN 23

## 2017-11-21 NOTE — ED TRIAGE NOTES
Pt sent in per EMS from [facility] for altered mental status. EMS reports that pt was standing at the nurses station on their arrival- staff stated that this was his normal status other than he is not as easily redirected as he usually was. Reported that pt swatted at staff and was not making much sense today and being repetitive. On arrival to ER pt alert, yelling \"HELLO\" over and over again. He could tell me he was at the hospital but not much else. Pt appears in no acute distress, VSS. Assessment complete.

## 2017-11-22 NOTE — ED NOTES
Bed: 022A  Expected date: 11/22/17  Expected time: 3:45 AM  Means of arrival: [facility]  Comments:     Alana Post  11/22/17 5401

## 2017-11-22 NOTE — ED NOTES
Pt resting in bed with eyes closed. No signs of distress noted. VSS. Respirations even and unlabored. Will continue to monitor.      Jason Sheehan RN  11/22/17 [id number]

## 2017-11-26 NOTE — ED PROVIDER NOTES
DISPOSITION/PLAN   Patient was discharged in stable condition. Will return if symptoms change or worsen, or for any sign or symptom deemed emergent by the patient or family members. Follow up as an outpatient, sooner if symptoms warrant.     PATIENT REFERRED TO:  Aliya Alfaro MD  [address] [place] [address]  [phone]            DISCHARGE MEDICATIONS:  Discharge Medication List as of 11/22/2017  5:17 AM          (Please note that portions of this note were completed with a voice recognition program.  Efforts were made to edit the dictations but occasionally words are mis-transcribed.)    TATY Garnett NP  11/26/17 7185

## 2020-02-03 NOTE — PLAN OF CARE
Problem: Nutrition  Goal: Optimal nutrition therapy  Outcome: Ongoing  Nutrition Problem: Inadequate oral intake  Intervention: Food and/or Nutrient Delivery: Continue current diet, Continue current ONS  Nutritional Goals: Patient will consume 75% or greater of meals and ONS during LOS Treatment completed.   · Will follow up cultures.

## 2022-08-10 NOTE — PLAN OF CARE
Call Regarding: patient calling wanting to know if Dr Phillips got a hold of Yani. Patient would like guidance.     Phone Number to be reach at:   Telephone Information:   Mobile [phone]         Please advise    Problem: Falls - Risk of  Goal: Absence of falls  Outcome: Ongoing  Patient was reeducated about the call light button to summon assistance. Patient belongings were all kept within reach. Side rails were up 2/4, and the bed was in the lowest position. Patient did not have a fall throughout this shift. Problem: Risk for Impaired Skin Integrity  Goal: Tissue integrity - skin and mucous membranes  Structural intactness and normal physiological function of skin and  mucous membranes. Outcome: Ongoing  The patient was turned throughout the night to promote tissue perfusion. The patient tolerated the turns well. During turning, the patient's back was checked for any signs of skin breakdown. Problem: Airway Clearance - Ineffective:  Goal: Clear lung sounds  Clear lung sounds   Outcome: Ongoing  Patient still had fine crackles in his upper lobes and airway, but it was markedly better than last night. Problem: Pain:  Goal: Pain level will decrease  Pain level will decrease   Outcome: Ongoing  Patient had several occurences throughout the night where he complained of generalized pain, and was treated with medication (see eMar).

## 2023-09-18 NOTE — PROGRESS NOTES
Problem: Pain  Goal: #Acceptable pain level achieved/maintained at rest using NRS/Faces  Description: This goal is used for patients who can self-report.  Acceptable means the level is at or below the identified comfort/function goal.  Outcome: Outcome Met, Continue evaluating goal progress toward completion  Goal: # Acceptable pain level achieved/maintained at rest using NRS/Faces without oversedation (opioid naive or PCA/Epidural infusion)  Description: This goal is used if Opioid-naïve or on PCA/Epidural Infusion.  Outcome: Outcome Met, Continue evaluating goal progress toward completion  Goal: # Acceptable pain level achieved/maintained with activity using NRS/Faces  Description: This goal is used for patients who can self-report and are not achieving acceptable pain control during activity.  Outcome: Outcome Met, Continue evaluating goal progress toward completion  Goal: Acceptable pain/comfort level is achieved/maintained at rest (based on Pain Behaviors Scale)  Description: This goal is used for patients who are not able to self-report pain and are assessed for pain using the Pain Behaviors Scale  Outcome: Outcome Met, Continue evaluating goal progress toward completion  Goal: Acceptable pain/comfort level is achieved/maintained at rest based on PAINAID scale (Dementia)  Description: This goal is used for patients who are not able to self-report pain, have dementia, and assessed using the PAINAD scale.  Outcome: Outcome Met, Continue evaluating goal progress toward completion  Goal: Acceptable pain/comfort level is achieved/maintained at rest (based on pediatric behavior tool: NIPS, NPASS, or FLACC)  Description: This goal is used for pediatric patients who are not able to self report pain.  Outcome: Outcome Met, Continue evaluating goal progress toward completion  Goal: # Verbalizes understanding of pain management  Description: Documented in Patient Education Activity  Outcome: Outcome Met, Continue  Consulted to assess red slow to pinky area to right buttock. Reviewed photo. Area is pink and intact. Spoke with primary RN and recommended Sensicare cream to area. Will sign off. Staff to call with any concerns or questions. evaluating goal progress toward completion  Goal: Verbalizes understanding and effective use of Patient Controlled Analgesia (PCA)  Description: Documented in Patient Education Activity  This goal is used for patients with PCA  Outcome: Outcome Met, Continue evaluating goal progress toward completion  Goal: Maximum comfort achieved/maintained at end of life (Hospice)  Outcome: Outcome Met, Continue evaluating goal progress toward completion     Problem: At Risk for Injury Due to Fall  Goal: # Patient does not fall  Outcome: Outcome Met, Continue evaluating goal progress toward completion  Goal: # Takes action to control condition specific risks  Outcome: Outcome Met, Continue evaluating goal progress toward completion  Goal: # Verbalizes understanding of fall-related injury personal risks  Description: Document education using the patient education activity  Outcome: Outcome Met, Continue evaluating goal progress toward completion     Problem: At Risk for Falls  Goal: # Patient does not fall  Outcome: Outcome Met, Continue evaluating goal progress toward completion  Goal: # Takes action to control fall-related risks  Outcome: Outcome Met, Continue evaluating goal progress toward completion  Goal: # Verbalizes understanding of fall risk/precautions  Description: Document education using the patient education activity  Outcome: Outcome Met, Continue evaluating goal progress toward completion